# Patient Record
Sex: MALE | Race: WHITE | Employment: OTHER | URBAN - METROPOLITAN AREA
[De-identification: names, ages, dates, MRNs, and addresses within clinical notes are randomized per-mention and may not be internally consistent; named-entity substitution may affect disease eponyms.]

---

## 2020-01-01 ENCOUNTER — OFFICE VISIT (OUTPATIENT)
Dept: ENT CLINIC | Age: 75
End: 2020-01-01

## 2020-01-01 ENCOUNTER — TELEPHONE (OUTPATIENT)
Dept: ENDOCRINOLOGY | Age: 75
End: 2020-01-01

## 2020-01-01 ENCOUNTER — HOSPITAL ENCOUNTER (OUTPATIENT)
Dept: PET IMAGING | Age: 75
Discharge: HOME | End: 2020-12-09
Payer: MEDICARE

## 2020-01-01 ENCOUNTER — HOSPITAL ENCOUNTER (OUTPATIENT)
Age: 75
End: 2020-01-01
Payer: MEDICARE

## 2020-01-01 VITALS — HEIGHT: 63 IN | BODY MASS INDEX: 22.5 KG/M2 | WEIGHT: 127 LBS

## 2020-01-01 VITALS
HEART RATE: 67 BPM | TEMPERATURE: 98.4 F | WEIGHT: 199 LBS | BODY MASS INDEX: 29.39 KG/M2 | DIASTOLIC BLOOD PRESSURE: 93 MMHG | SYSTOLIC BLOOD PRESSURE: 166 MMHG

## 2020-01-01 LAB — CULTURE NOSE: NORMAL

## 2020-01-01 PROCEDURE — 3430000000 HC RX DIAGNOSTIC RADIOPHARMACEUTICAL: Performed by: RADIOLOGY

## 2020-01-01 PROCEDURE — 78815 PET IMAGE W/CT SKULL-THIGH: CPT

## 2020-01-01 PROCEDURE — A9552 F18 FDG: HCPCS | Performed by: RADIOLOGY

## 2020-01-01 PROCEDURE — 99024 POSTOP FOLLOW-UP VISIT: CPT | Performed by: OTOLARYNGOLOGY

## 2020-01-01 RX ORDER — LEVOFLOXACIN 500 MG
500 TABLET ORAL DAILY
Qty: 10 TABLET | Refills: 0 | Status: ON HOLD | OUTPATIENT
Start: 2020-01-01 | End: 2021-01-01 | Stop reason: HOSPADM

## 2020-01-01 RX ORDER — METHYLPREDNISOLONE 4 MG/1
4 TABLET ORAL SEE ADMIN INSTRUCTIONS
Qty: 1 KIT | Refills: 0 | Status: SHIPPED | OUTPATIENT
Start: 2020-01-01 | End: 2021-01-01

## 2020-01-01 RX ORDER — FLUDEOXYGLUCOSE F 18 200 MCI/ML
14.69 INJECTION, SOLUTION INTRAVENOUS
Status: COMPLETED | OUTPATIENT
Start: 2020-01-01 | End: 2020-01-01

## 2020-01-01 RX ORDER — CLINDAMYCIN HYDROCHLORIDE 300 MG/1
300 CAPSULE ORAL 3 TIMES DAILY
Qty: 30 CAPSULE | Refills: 0 | Status: SHIPPED | OUTPATIENT
Start: 2020-01-01 | End: 2020-01-01

## 2020-01-01 RX ADMIN — FLUDEOXYGLUCOSE F 18 14.69 MILLICURIE: 200 INJECTION, SOLUTION INTRAVENOUS at 16:07

## 2020-07-02 LAB
CHOLESTEROL, TOTAL: 160 MG/DL
CHOLESTEROL/HDL RATIO: NORMAL
CREATININE: 0.9 MG/DL
HDLC SERPL-MCNC: 40 MG/DL (ref 35–70)
LDL CHOLESTEROL CALCULATED: 85 MG/DL (ref 0–160)
NONHDLC SERPL-MCNC: 120 MG/DL
POTASSIUM (K+): 4.9
TRIGL SERPL-MCNC: 174 MG/DL
VLDLC SERPL CALC-MCNC: NORMAL MG/DL

## 2020-10-14 ENCOUNTER — OFFICE VISIT (OUTPATIENT)
Dept: ENT CLINIC | Age: 75
End: 2020-10-14
Payer: MEDICARE

## 2020-10-14 VITALS — SYSTOLIC BLOOD PRESSURE: 151 MMHG | HEART RATE: 68 BPM | DIASTOLIC BLOOD PRESSURE: 78 MMHG

## 2020-10-14 PROCEDURE — G8421 BMI NOT CALCULATED: HCPCS | Performed by: OTOLARYNGOLOGY

## 2020-10-14 PROCEDURE — 1036F TOBACCO NON-USER: CPT | Performed by: OTOLARYNGOLOGY

## 2020-10-14 PROCEDURE — G8400 PT W/DXA NO RESULTS DOC: HCPCS | Performed by: OTOLARYNGOLOGY

## 2020-10-14 PROCEDURE — G8427 DOCREV CUR MEDS BY ELIG CLIN: HCPCS | Performed by: OTOLARYNGOLOGY

## 2020-10-14 PROCEDURE — 3017F COLORECTAL CA SCREEN DOC REV: CPT | Performed by: OTOLARYNGOLOGY

## 2020-10-14 PROCEDURE — 1090F PRES/ABSN URINE INCON ASSESS: CPT | Performed by: OTOLARYNGOLOGY

## 2020-10-14 PROCEDURE — 99203 OFFICE O/P NEW LOW 30 MIN: CPT | Performed by: OTOLARYNGOLOGY

## 2020-10-14 PROCEDURE — 4040F PNEUMOC VAC/ADMIN/RCVD: CPT | Performed by: OTOLARYNGOLOGY

## 2020-10-14 PROCEDURE — 1123F ACP DISCUSS/DSCN MKR DOCD: CPT | Performed by: OTOLARYNGOLOGY

## 2020-10-14 PROCEDURE — G8484 FLU IMMUNIZE NO ADMIN: HCPCS | Performed by: OTOLARYNGOLOGY

## 2020-10-14 RX ORDER — METHYLPREDNISOLONE 4 MG/1
4 TABLET ORAL SEE ADMIN INSTRUCTIONS
Qty: 1 KIT | Refills: 0 | Status: SHIPPED | OUTPATIENT
Start: 2020-10-14 | End: 2020-10-28 | Stop reason: ALTCHOICE

## 2020-10-14 RX ORDER — VALSARTAN 80 MG/1
80 TABLET ORAL DAILY
COMMUNITY
Start: 2020-07-01

## 2020-10-14 RX ORDER — MULTIVITAMIN
1 TABLET ORAL DAILY
COMMUNITY

## 2020-10-14 RX ORDER — ASCORBIC ACID 500 MG
500 TABLET ORAL DAILY
COMMUNITY

## 2020-10-14 RX ORDER — ATORVASTATIN CALCIUM 40 MG/1
40 TABLET, FILM COATED ORAL NIGHTLY
COMMUNITY
Start: 2019-10-29

## 2020-10-14 RX ORDER — AMOXICILLIN AND CLAVULANATE POTASSIUM 875; 125 MG/1; MG/1
1 TABLET, FILM COATED ORAL 2 TIMES DAILY
Qty: 20 TABLET | Refills: 0 | Status: SHIPPED | OUTPATIENT
Start: 2020-10-14 | End: 2020-10-28 | Stop reason: ALTCHOICE

## 2020-10-14 ASSESSMENT — ENCOUNTER SYMPTOMS
SORE THROAT: 0
EYES NEGATIVE: 1
SINUS PAIN: 0
RESPIRATORY NEGATIVE: 1
ALLERGIC/IMMUNOLOGIC NEGATIVE: 1
FACIAL SWELLING: 0
TROUBLE SWALLOWING: 0
SINUS PRESSURE: 1
VOICE CHANGE: 0
RHINORRHEA: 1

## 2020-10-14 NOTE — PROGRESS NOTES
SUBJECTIVE:    Chief Complaint   Patient presents with    Epistaxis     nose bleeding,         Lincoln Conde is a 75 y.o. female    Patient has a history of recurring epistaxis for the past couple of months but with the last episode not since August.  He did in fact have to have Rhino Rocket inserted on the right side on August 11. Most of the bleeding is been on the right but he did have some on the left as well. Is been on Afrin spray along with saline spray and told not to blow his nose since his last episode latter part of August.  He has had no fever. Has had a history of stents but has not been on any recent anticoagulant drugs or aspirin. There is no history of trauma. He has had no particular pain. He has been getting purulent drainage from the right side of his nose. No past medical history on file. No past surgical history on file. No family history on file. Social History     Tobacco Use    Smoking status: Never Smoker    Smokeless tobacco: Never Used   Substance Use Topics    Alcohol use: Not on file        Review of Systems:  Review of Systems   Constitutional: Negative. Negative for fever and unexpected weight change. HENT: Positive for hearing loss, nosebleeds, postnasal drip, rhinorrhea, sinus pressure and tinnitus. Negative for congestion, dental problem, drooling, ear discharge, ear pain, facial swelling, mouth sores, sinus pain, sneezing, sore throat, trouble swallowing and voice change. Eyes: Negative. Respiratory: Negative. Cardiovascular: Negative. History of stents   Endocrine: Negative. Skin: Negative. Allergic/Immunologic: Negative. Neurological: Negative. Hematological: Negative. Does not bruise/bleed easily. Psychiatric/Behavioral: Negative. OBJECTIVE:  BP (!) 151/78   Pulse 68   Physical Exam  Vitals signs and nursing note reviewed. Constitutional:       General: She is not in acute distress.      Appearance: Normal appearance. She is normal weight. She is not ill-appearing, toxic-appearing or diaphoretic. HENT:      Head: Normocephalic and atraumatic. Right Ear: Tympanic membrane, ear canal and external ear normal. There is no impacted cerumen. Left Ear: Tympanic membrane, ear canal and external ear normal. There is no impacted cerumen. Nose:      Comments: Nasal mucosa treated with cotton pledgets  impregnated with Afrin and lidocaine placed in middle meatuses against turbinates. Pledgets left in place for five minutes and removed enabling enhanced visualization. The exam revealed positive edema of mucosa. it was positive for erythema indicative of infection. There was evidence of deviation of the septum which was not significant. There were possible polyps present on the right. [place] There were not other masses present. The turbinates were not enlarged beyond normal.       Mouth/Throat:      Mouth: Mucous membranes are moist.      Pharynx: Oropharynx is clear. No oropharyngeal exudate or posterior oropharyngeal erythema. Eyes:      Extraocular Movements: Extraocular movements intact. Conjunctiva/sclera: Conjunctivae normal.      Pupils: Pupils are equal, round, and reactive to light. Neck:      Musculoskeletal: Normal range of motion and neck supple. No neck rigidity or muscular tenderness. Vascular: No carotid bruit. Cardiovascular:      Rate and Rhythm: Normal rate and regular rhythm. Pulmonary:      Effort: Pulmonary effort is normal.   Lymphadenopathy:      Cervical: No cervical adenopathy. Skin:     General: Skin is warm and dry. Neurological:      General: No focal deficit present. Mental Status: She is alert and oriented to person, place, and time. Mental status is at baseline. Psychiatric:         Mood and Affect: Mood normal.         Behavior: Behavior normal.         Thought Content:  Thought content normal.         Judgment: Judgment normal.          ASSESSMENT:    There is evidence of infection in the right side as well as a probable obstruction due to a polyp in the middle meatus. All of this is on the right side. This appears to be perhaps the source of his previous bleeding. No active bleeding is apparent at this time except for some old blood present in the area. PLAN:     Sinus CT scan will be obtained. We will place him on Augmentin along with a Medrol Dosepak in the interim. He will continue his current use of the Afrin and saline.     Regis Escalante MD

## 2020-10-16 ENCOUNTER — HOSPITAL ENCOUNTER (OUTPATIENT)
Dept: CT IMAGING | Age: 75
Discharge: HOME | End: 2020-10-16
Payer: MEDICARE

## 2020-10-16 PROCEDURE — 70486 CT MAXILLOFACIAL W/O DYE: CPT

## 2020-10-20 ENCOUNTER — TELEPHONE (OUTPATIENT)
Dept: OTOLARYNGOLOGY | Age: 75
End: 2020-10-20

## 2020-10-20 NOTE — TELEPHONE ENCOUNTER
Discussed with the patient and his wife the results of CT scan which do indicate a large polyp on the right side of his nose blocking the sinuses on that side. Apparently, this is the source of his epistaxis which he continues to notice to some degree. We will perform a right-sided FESS. Procedure and process have been explained. A Covid test which will be done automatically will be done orally due to risk of nosebleed.

## 2020-10-27 ENCOUNTER — OFFICE VISIT (OUTPATIENT)
Dept: FAMILY MEDICINE CLINIC | Age: 75
End: 2020-10-27
Payer: MEDICARE

## 2020-10-27 VITALS
BODY MASS INDEX: 29.18 KG/M2 | WEIGHT: 197 LBS | OXYGEN SATURATION: 98 % | TEMPERATURE: 98 F | HEART RATE: 84 BPM | HEIGHT: 69 IN | SYSTOLIC BLOOD PRESSURE: 110 MMHG | DIASTOLIC BLOOD PRESSURE: 64 MMHG

## 2020-10-27 PROCEDURE — 3017F COLORECTAL CA SCREEN DOC REV: CPT | Performed by: FAMILY MEDICINE

## 2020-10-27 PROCEDURE — 1123F ACP DISCUSS/DSCN MKR DOCD: CPT | Performed by: FAMILY MEDICINE

## 2020-10-27 PROCEDURE — 99203 OFFICE O/P NEW LOW 30 MIN: CPT | Performed by: FAMILY MEDICINE

## 2020-10-27 PROCEDURE — G8484 FLU IMMUNIZE NO ADMIN: HCPCS | Performed by: FAMILY MEDICINE

## 2020-10-27 PROCEDURE — G8417 CALC BMI ABV UP PARAM F/U: HCPCS | Performed by: FAMILY MEDICINE

## 2020-10-27 PROCEDURE — 1036F TOBACCO NON-USER: CPT | Performed by: FAMILY MEDICINE

## 2020-10-27 PROCEDURE — G8427 DOCREV CUR MEDS BY ELIG CLIN: HCPCS | Performed by: FAMILY MEDICINE

## 2020-10-27 PROCEDURE — 4040F PNEUMOC VAC/ADMIN/RCVD: CPT | Performed by: FAMILY MEDICINE

## 2020-10-27 SDOH — HEALTH STABILITY: MENTAL HEALTH: HOW OFTEN DO YOU HAVE A DRINK CONTAINING ALCOHOL?: NEVER

## 2020-10-27 ASSESSMENT — PATIENT HEALTH QUESTIONNAIRE - PHQ9
2. FEELING DOWN, DEPRESSED OR HOPELESS: 0
1. LITTLE INTEREST OR PLEASURE IN DOING THINGS: 0
SUM OF ALL RESPONSES TO PHQ QUESTIONS 1-9: 0
SUM OF ALL RESPONSES TO PHQ9 QUESTIONS 1 & 2: 0
SUM OF ALL RESPONSES TO PHQ QUESTIONS 1-9: 0
SUM OF ALL RESPONSES TO PHQ QUESTIONS 1-9: 0

## 2020-10-27 ASSESSMENT — ENCOUNTER SYMPTOMS
COUGH: 0
SHORTNESS OF BREATH: 0
ABDOMINAL PAIN: 0
SORE THROAT: 0
VOMITING: 0
BLOOD IN STOOL: 0
NAUSEA: 0

## 2020-10-27 NOTE — PATIENT INSTRUCTIONS
Patient Education      Go to the ER ASAP if you develop any chest pain, shortness of breath, facial droop, slurred speech, weakness/numbness in your arms or legs or double vision! Learning About Coronary Artery Disease (CAD)  What is coronary artery disease? Coronary artery disease (CAD) occurs when plaque builds up in the arteries that bring oxygen-rich blood to your heart. Plaque is a fatty substance made of cholesterol, calcium, and other substances in the blood. This process is called hardening of the arteries, or atherosclerosis. What happens when you have coronary artery disease? · Plaque may narrow the coronary arteries. Narrowed arteries cause poor blood flow. This can lead to angina symptoms such as chest pain or discomfort. If blood flow is completely blocked, you could have a heart attack. · You can slow CAD and reduce the risk of future problems by making changes in your lifestyle. These include quitting smoking and eating heart-healthy foods. · Treatments for CAD, along with changes in your lifestyle, can help you live a longer and healthier life. How can you prevent coronary artery disease? · Do not smoke. It may be the best thing you can do to prevent heart disease. If you need help quitting, talk to your doctor about stop-smoking programs and medicines. These can increase your chances of quitting for good. · Be active. Get at least 30 minutes of exercise on most days of the week. Walking is a good choice. You also may want to do other activities, such as running, swimming, cycling, or playing tennis or team sports. · Eat heart-healthy foods. Eat more fruits and vegetables and less foods that contain saturated and trans fats. Limit alcohol, sodium, and sweets. · Stay at a healthy weight. Lose weight if you need to. · Manage other health problems such as diabetes, high blood pressure, and high cholesterol. How is coronary artery disease treated?   · Your doctor will suggest that you

## 2020-10-27 NOTE — PROGRESS NOTES
Chief Complaint   Patient presents with   Montse Wallace Establish Care     severe nose bleeds, trying to get established in [facility] Exam     Sinus Endoscopy 11/2 Dr. Lyndon Mayorga is a 75 y.o. male who presents for preoperative evaluation. Pt was having a persistent nosebleed from L nare x 2 months and had it cauterized which was unsuccessful and a rhinorocket was placed. Pt then saw ENT and had a CT scan sinuses done which showed a large nasal polyp versus mass and pt is scheduled for sugical excision on 11/02/2020    Patient has a past medical history significant for dyslipidemia and CAD s/p MI with stent placement 2017 and is followed by Cardiology and is on lipitor and was on a Baby ASA but it was discontinued secondary to his recent epistaxis. Of note pt was started on diovan medication 6 months ago by Cardiology secondary to borderline HTN     Pt has a hx of AAA; Pt has never had a cardiac stress test since his stents were placed but does do strenuous work w/o any CP or SOB    Pt had bloodwork [lipid panel, BMP, CBC, liver enzymes] done 07/02/2020 which was WNL except for an elevated glucose 137 and triglyceride 174 --- Pt has been trying to follow a diabetic diet as he was eating a lot of sweets    Pt denies any hx of asthma or strokes    FHx unknown    Pt is an exsmoker quit 2001 and denies alcohol use      ENT note 10/14/2020  Patient has a history of recurring epistaxis for the past couple of months but with the last episode not since August.  He did in fact have to have Miro inserted on the right side on August 11. Most of the bleeding is been on the right but he did have some on the left as well. Is been on Afrin spray along with saline spray and told not to blow his nose since his last episode latter part of August.  He has had no fever. Has had a history of stents but has not been on any recent anticoagulant drugs or aspirin. There is no history of trauma.   He has had no particular pain. He has been getting purulent drainage from the right side of his nose   ASSESSMENT:  There is evidence of infection in the right side as well as a probable obstruction due to a polyp in the middle meatus. All of this is on the right side. This appears to be perhaps the source of his previous bleeding. No active bleeding is apparent at this time except for some old blood present in the area. PLAN:  Sinus CT scan will be obtained. We will place him on Augmentin along with a Medrol Dosepak in the interim. He will continue his current use of the Afrin and saline.         CT scan sinuses 10/16/2020  Expansive soft tissue mass in the right nasal cavity which may represent a    large nasal polyp or a sinus mass.  Direct visualization is suggested.  MRI    could further characterize the lesion if indicated.         Chronic sinusitis of the right maxillary sinus and sphenoid sinus.  No acute    fluid levels. MRI Angio head 07/09/2020  1. Loss of signal within the nondominant right vertebral artery may be related to slow flow or age indeterminate occlusion. Consider CTA neck for further evaluation. 2.  Mild to moderate intracranial atherosclerotic disease including a moderate focal stenosis of a proximal right M2 segment. 3.  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 4.  No definite aneurysm identified. US abdomen 09/30/2019  3.0 cm aneurysmal dilatation proximal abdominal aorta. Mid aorta 2.6 cm. Three year follow-up is recommended. Cardiology Note 07/01/2020  Assessment:   ICD-10-CM ICD-9-CM   1. Presence of drug coated stent in left circumflex coronary artery Z95.5 V45.82   2. Presence of drug coated stent in anterior descending branch of left coronary artery Z95.5 V45.82   3. Multiple vessel coronary artery disease I25.10 414.00   4. Sinus bradycardia R00.1 427.89   5. PAC (premature atrial contraction) I49.1 427.61   6. Mixed hyperlipidemia E78.2 272.2   7.  Essential hypertension I10 401.9   8. Abdominal aortic aneurysm (AAA) without rupture (HCC) I71.4 441.4     Plan:     1. Ok to hold until nose bleeding is resolved   2. Agree with ENT eval  3. Can hold off on holter  4. Start valsartan 80 mg daily  5. Call office with BP response within a week  6. May need dedicated sinus CT  7. See me in 6 months  8. FU brain MRI as scheduled             Review of Systems   Constitutional: Negative for fatigue and fever. HENT: Positive for nosebleeds (L nare x 2 months and is on afrin spray). Negative for sore throat. Eyes:        Negative blurred vision or diplopia   Respiratory: Negative for cough and shortness of breath. Cardiovascular: Negative for chest pain, palpitations and leg swelling. Gastrointestinal: Negative for abdominal pain, blood in stool, nausea and vomiting. Negative melena or indigestion   Endocrine: Negative for polydipsia and polyuria. Genitourinary: Negative for dysuria and hematuria. Musculoskeletal: Negative for arthralgias. Skin: Negative for rash. Neurological: Negative for dizziness, seizures, syncope, speech difficulty, weakness and headaches. Psychiatric/Behavioral: Negative for dysphoric mood. The patient is not nervous/anxious. Vitals:    10/27/20 1047   BP: 110/64   Pulse: 84   Temp: 98 °F (36.7 °C)   SpO2: 98%         Physical Exam  Vitals signs reviewed. Constitutional:       General: He is not in acute distress. HENT:      Mouth/Throat:      Mouth: Mucous membranes are moist.      Pharynx: Oropharynx is clear. Eyes:      General: No scleral icterus. Pupils: Pupils are equal, round, and reactive to light. Comments: Pink conjunctivae    Neck:      Thyroid: No thyromegaly. Comments: No carotid bruits noted B/L  Cardiovascular:      Heart sounds: Murmur (2/6 systolic) present. No friction rub. No gallop. Pulmonary:      Effort: Pulmonary effort is normal.      Breath sounds: Normal breath sounds. blood flow. This can lead to angina symptoms such as chest pain or discomfort. If blood flow is completely blocked, you could have a heart attack. · You can slow CAD and reduce the risk of future problems by making changes in your lifestyle. These include quitting smoking and eating heart-healthy foods. · Treatments for CAD, along with changes in your lifestyle, can help you live a longer and healthier life. How can you prevent coronary artery disease? · Do not smoke. It may be the best thing you can do to prevent heart disease. If you need help quitting, talk to your doctor about stop-smoking programs and medicines. These can increase your chances of quitting for good. · Be active. Get at least 30 minutes of exercise on most days of the week. Walking is a good choice. You also may want to do other activities, such as running, swimming, cycling, or playing tennis or team sports. · Eat heart-healthy foods. Eat more fruits and vegetables and less foods that contain saturated and trans fats. Limit alcohol, sodium, and sweets. · Stay at a healthy weight. Lose weight if you need to. · Manage other health problems such as diabetes, high blood pressure, and high cholesterol. How is coronary artery disease treated? · Your doctor will suggest that you make lifestyle changes. For example, your doctor may ask you to eat healthy foods, quit smoking, lose extra weight, and be more active. · You will have to take medicines. · Your doctor may suggest a procedure to open narrowed or blocked arteries. This is called angioplasty. Or your doctor may suggest using healthy blood vessels to create detours around narrowed or blocked arteries. This is called bypass surgery. Follow-up care is a key part of your treatment and safety. Be sure to make and go to all appointments, and call your doctor if you are having problems. It's also a good idea to know your test results and keep a list of the medicines you take.   Where can you learn more? Go to [email or web address] and sign in to your [employer] account. Enter [phone] in the [facility] box to learn more about \"Learning About Coronary Artery Disease (CAD). \"     If you do not have an account, please click on the \"Sign Up Now\" link. Current as of: December 16, 2019               Content Version: 12.6  © 6214-2415 [employer]. Care instructions adapted under license by [facility] ([facility]). If you have questions about a medical condition or this instruction, always ask your healthcare professional. [address] any warranty or liability for your use of this information.

## 2020-10-28 ENCOUNTER — OFFICE VISIT (OUTPATIENT)
Dept: PRIMARY CARE CLINIC | Age: 75
End: 2020-10-28
Payer: MEDICARE

## 2020-10-28 ENCOUNTER — OFFICE VISIT (OUTPATIENT)
Dept: NEUROLOGY | Age: 75
End: 2020-10-28
Payer: MEDICARE

## 2020-10-28 VITALS
WEIGHT: 197 LBS | SYSTOLIC BLOOD PRESSURE: 146 MMHG | TEMPERATURE: 97.5 F | BODY MASS INDEX: 29.18 KG/M2 | HEIGHT: 69 IN | HEART RATE: 55 BPM | DIASTOLIC BLOOD PRESSURE: 75 MMHG

## 2020-10-28 PROCEDURE — 99211 OFF/OP EST MAY X REQ PHY/QHP: CPT | Performed by: NURSE PRACTITIONER

## 2020-10-28 PROCEDURE — 99204 OFFICE O/P NEW MOD 45 MIN: CPT | Performed by: PSYCHIATRY & NEUROLOGY

## 2020-10-28 PROCEDURE — 1123F ACP DISCUSS/DSCN MKR DOCD: CPT | Performed by: PSYCHIATRY & NEUROLOGY

## 2020-10-28 PROCEDURE — G8417 CALC BMI ABV UP PARAM F/U: HCPCS | Performed by: PSYCHIATRY & NEUROLOGY

## 2020-10-28 PROCEDURE — G8427 DOCREV CUR MEDS BY ELIG CLIN: HCPCS | Performed by: PSYCHIATRY & NEUROLOGY

## 2020-10-28 PROCEDURE — 4040F PNEUMOC VAC/ADMIN/RCVD: CPT | Performed by: PSYCHIATRY & NEUROLOGY

## 2020-10-28 PROCEDURE — G8484 FLU IMMUNIZE NO ADMIN: HCPCS | Performed by: PSYCHIATRY & NEUROLOGY

## 2020-10-28 PROCEDURE — 3017F COLORECTAL CA SCREEN DOC REV: CPT | Performed by: PSYCHIATRY & NEUROLOGY

## 2020-10-28 PROCEDURE — 1036F TOBACCO NON-USER: CPT | Performed by: PSYCHIATRY & NEUROLOGY

## 2020-10-28 NOTE — PROGRESS NOTES
Name_______________________________________Printed:____________________  Date and time of surgery____11/2/2020    0700____________________Arrival Time:____0545   [facility]____________   1. The instructions given regarding when and if a patient needs to stop oral intake prior to surgery varies. Follow the specific instructions you were given                  _X__Nothing to eat or to drink after Midnight the night before.                             ____Endoscopy patient follow your DRS instructions-generally you will be doing a part of the prep after Midnight                   ____Carbo loading or ERAS instructions will be given to select patients-if you have been given those instructions -please do the following                           The evening before your surgery after dinner before midnight drink 40 ounces of gatorade. If you are diabetic use sugar free. The morning of surgery drink 40 ounces of water. This needs to be finished 3 hours prior to your surgery start time. 2. Take the following pills with a small sip of water on the morning of surgery___________________________________________________               X   Do not take blood pressure medications ending in pril or sartan the nixon prior to surgery or the morning of surgery_   3. Aspirin, Ibuprofen, Advil, Naproxen, Vitamin E and other Anti-inflammatory products and supplements should be stopped for 5 -7days before surgery or as directed by your physician. 4. Check with your Doctor regarding stopping Plavix, Coumadin,Eliquis, Lovenox,Effient,Pradaxa,Xarelto, Fragmin or other blood thinners and follow their instructions. 5. Do not smoke, and do not drink any alcoholic beverages 24 hours prior to surgery. This includes NA Beer. Refrain from the usage of any recreational drugs. 6. You may brush your teeth and gargle the morning of surgery. DO NOT SWALLOW WATER   7.  You MUST make arrangements for a responsible adult to stay on site while you are here and take you home after your surgery. You will not be allowed to leave alone or drive yourself home. It is strongly suggested someone stay with you the first 24 hrs. Your surgery will be cancelled if you do not have a ride home. 8. A parent/legal guardian must accompany a child scheduled for surgery and plan to stay at the hospital until the child is discharged. Please do not bring other children with you. 9. Please wear simple, loose fitting clothing to the hospital.  Yolanda Perez not bring valuables (money, credit cards, checkbooks, etc.) Do not wear any makeup (including no eye makeup) or nail polish on your fingers or toes. 10. DO NOT wear any jewelry or piercings on day of surgery. All body piercing jewelry must be removed. 11. If you have _X__dentures, they will be removed before going to the OR; we will provide you a container. If you wear ___contact lenses or ___glasses, they will be removed; please bring a case for them. 12. Please see your family doctor/pediatrician for a history & physical and/or concerning medications. Bring any test results/reports from your physician's office. PCP__________________Phone___________H&P Appt. Date________             13 If you  have a Living Will and Durable Power of  for Healthcare, please bring in a copy. 15. Notify your Surgeon if you develop any illness between now and surgery  time, cough, cold, fever, sore throat, nausea, vomiting, etc.  Please notify your surgeon if you experience dizziness, shortness of breath or blurred vision between now & the time of your surgery             15. DO NOT shave your operative site 96 hours prior to surgery. For face & neck surgery, men may use an electric razor 48 hours prior to surgery. 16. Shower the night before or morning of surgery using an antibacterial soap or as you have been instructed.              17. To provide excellent care visitors will be limited to one in the room at any given time. 18.  Please bring picture ID and insurance card. 19.  Visit our web site for additional information:  [email or web address]              20.During flu season no children under the age of 15 are permitted in the hospital for the safety of all patients. 21. If you take a long acting insulin in the evening only  take half of your usual  dose the night  before your procedure              22. If you use a c-pap please bring DOS if staying overnight,             23.For your convenience [facility] has a pharmacy on site to fill your prescriptions. 24. If you use oxygen and have a portable tank please bring it  with you the DOS             25. Bring a complete list of all your medications with name and dose include any supplements. 26. Other__________________________________________   *Please call pre admission testing if you any further questions   [facility]         [place]   [address]    [place]  [phone]   [address] [place] [address]       All above information reviewed with patient in person or by phone. Patient verbalizes understanding. All questions and concerns addressed. Patient/Rep___LENNOX VAIL (WIFE)_________________                                                                                                                             There is a one visitor policy at [facility] for all surgeries and endoscopies. Whether the visitor can stay or will be asked to wait in the car will depend on the current policy and if social distancing can be maintained. The policy is subject to change at any time. Please make sure the visitor has a cell phone that is on,charged and able to accept calls, as this may be the way that the staff communicates with them. Pain management is NO VISITOR policyThe patients ride is expected to remain in the car with a cell phone for communication. If the ride is leaving the hospital grounds please make sure they are back in time for pickup. Have the patient inform the staff on arrival what their rides plans are while the patient is in the facility. At the MAIN there is one visitor allowed. Please note that the visitor policy is subject to change.        PRE OP INSTRUCTIONS

## 2020-10-28 NOTE — PROGRESS NOTES
Brittany Medraon received a viral test for COVID-19. They were educated on isolation and quarantine as appropriate. For any symptoms, they were directed to seek care from their PCP, given contact information to establish with a doctor, directed to an urgent care or the emergency room.

## 2020-10-28 NOTE — PROGRESS NOTES
clubs or organizations: Not on file     Relationship status: Not on file    Intimate partner violence     Fear of current or ex partner: Not on file     Emotionally abused: Not on file     Physically abused: Not on file     Forced sexual activity: Not on file   Other Topics Concern    Not on file   Social History Narrative    Not on file       PHYSICAL EXAMINATION:  BP (!) 146/75   Pulse 55   Temp 97.5 °F (36.4 °C)   Ht 5' 9\" (1.753 m)   Wt 197 lb (89.4 kg)   BMI 29.09 kg/m²   Appearance: Well appearing, well nourished and in no distress  Mental Status Exam: Patient is alert, oriented to person, place and time. Recent and remote memory is normal  Fund of Knowledge is normal  Attention/concentration is normal.   Speech : No dysarthria  Language : No aphasia  Funduscopic Exam: sharp disc margins  Cranial Nerves:   II: Visual fields:  Full to confrontation  III: Pupils:  equal, round, reactive to light  III,IV,VI: Extra Ocular Movements are intact. No nystagmus  V: Facial sensation is intact to pin prick and light touch  VII: Facial strength and movements: intact and symmetric smile,cheek puffing and eyebrow elevation  VIII: Hearing:  Intact to finger rub bilaterally  IX: Palate  elevation is symmetric  XI: Shoulder shrug is intact  XII: Tongue movements are normal  Motor:  Muscle tone and bulk are normal.   Strength is symmetrical 5/5 in all four extremities. Sensory: Intact to light touch and  pin prick in all four extremities  Coordination:  Normal  Finger to Nose and Heel to Shin bilaterally    . Reflexes:  DTR +2 and symmetric bilaterally  Plantar response: Flexor bilaterally  Gait: Gait and station is normal. Patient can toe/ heel and tandem walk without difficulty  Romberg: negative  Vascular: No carotid bruit bilaterally        DATA:  Lab results and radiology results from [facility] were reviewed. MR angiogram results from [facility] were reviewed.     IMPRESSION :  Asymptomatic right vertebral occlusion  MR angiography of the head also shows atherosclerotic changes in the M2 segment of the right middle cerebral artery. Patient has severe nosebleeds and CT sinuses showed an expansile soft tissue mass in the right nasal cavity. RECOMMENDATIONS :  Discussed with patient and his family. Reviewed MRA  reports. These are incidental findings and do not need any further work-up at this time. There is no contraindication from a neurological standpoint for any necessary ENT surgery that is being planned. I would recommend the patient start antiplatelet therapy with low-dose aspirin after the surgery and after his nasal bleeding has resolved. I would also recommend continued aggressive management of hypertension and hyperlipidemia. I will see him on a as needed basis. Thank you for this consultation. Please note a portion of this chart was generated using dragon dictation software. Although every effort was made to ensure the accuracy of this automated transcription, some errors in transcription may have occurred.

## 2020-10-29 LAB — SARS-COV-2: NOT DETECTED

## 2020-10-30 NOTE — RESULT ENCOUNTER NOTE

## 2020-11-02 ENCOUNTER — ANESTHESIA (OUTPATIENT)
Dept: OPERATING ROOM | Age: 75
End: 2020-11-02
Payer: MEDICARE

## 2020-11-02 ENCOUNTER — ANESTHESIA EVENT (OUTPATIENT)
Dept: OPERATING ROOM | Age: 75
End: 2020-11-02
Payer: MEDICARE

## 2020-11-02 ENCOUNTER — TELEPHONE (OUTPATIENT)
Dept: ENT CLINIC | Age: 75
End: 2020-11-02

## 2020-11-02 ENCOUNTER — HOSPITAL ENCOUNTER (OUTPATIENT)
Age: 75
Setting detail: OUTPATIENT SURGERY
Discharge: HOME | End: 2020-11-02
Attending: OTOLARYNGOLOGY | Admitting: OTOLARYNGOLOGY
Payer: MEDICARE

## 2020-11-02 VITALS
OXYGEN SATURATION: 99 % | DIASTOLIC BLOOD PRESSURE: 88 MMHG | SYSTOLIC BLOOD PRESSURE: 180 MMHG | RESPIRATION RATE: 3 BRPM

## 2020-11-02 VITALS
TEMPERATURE: 97.5 F | RESPIRATION RATE: 14 BRPM | WEIGHT: 196 LBS | SYSTOLIC BLOOD PRESSURE: 172 MMHG | DIASTOLIC BLOOD PRESSURE: 70 MMHG | HEIGHT: 69 IN | BODY MASS INDEX: 29.03 KG/M2 | HEART RATE: 58 BPM | OXYGEN SATURATION: 94 %

## 2020-11-02 PROCEDURE — 31267 ENDOSCOPY MAXILLARY SINUS: CPT | Performed by: OTOLARYNGOLOGY

## 2020-11-02 PROCEDURE — 88342 IMHCHEM/IMCYTCHM 1ST ANTB: CPT

## 2020-11-02 PROCEDURE — 6360000002 HC RX W HCPCS: Performed by: NURSE ANESTHETIST, CERTIFIED REGISTERED

## 2020-11-02 PROCEDURE — 3600000014 HC SURGERY LEVEL 4 ADDTL 15MIN: Performed by: OTOLARYNGOLOGY

## 2020-11-02 PROCEDURE — 2500000003 HC RX 250 WO HCPCS: Performed by: NURSE ANESTHETIST, CERTIFIED REGISTERED

## 2020-11-02 PROCEDURE — 2500000003 HC RX 250 WO HCPCS: Performed by: OTOLARYNGOLOGY

## 2020-11-02 PROCEDURE — 2580000003 HC RX 258: Performed by: OTOLARYNGOLOGY

## 2020-11-02 PROCEDURE — 31253 NSL/SINS NDSC TOTAL: CPT | Performed by: OTOLARYNGOLOGY

## 2020-11-02 PROCEDURE — 88360 TUMOR IMMUNOHISTOCHEM/MANUAL: CPT

## 2020-11-02 PROCEDURE — 3700000000 HC ANESTHESIA ATTENDED CARE: Performed by: OTOLARYNGOLOGY

## 2020-11-02 PROCEDURE — 6370000000 HC RX 637 (ALT 250 FOR IP): Performed by: OTOLARYNGOLOGY

## 2020-11-02 PROCEDURE — 88305 TISSUE EXAM BY PATHOLOGIST: CPT

## 2020-11-02 PROCEDURE — 88341 IMHCHEM/IMCYTCHM EA ADD ANTB: CPT

## 2020-11-02 PROCEDURE — 6360000002 HC RX W HCPCS: Performed by: OTOLARYNGOLOGY

## 2020-11-02 PROCEDURE — 31254 NSL/SINS NDSC W/PRTL ETHMDCT: CPT | Performed by: OTOLARYNGOLOGY

## 2020-11-02 PROCEDURE — 3700000001 HC ADD 15 MINUTES (ANESTHESIA): Performed by: OTOLARYNGOLOGY

## 2020-11-02 PROCEDURE — 7100000001 HC PACU RECOVERY - ADDTL 15 MIN: Performed by: OTOLARYNGOLOGY

## 2020-11-02 PROCEDURE — 7100000011 HC PHASE II RECOVERY - ADDTL 15 MIN: Performed by: OTOLARYNGOLOGY

## 2020-11-02 PROCEDURE — 2709999900 HC NON-CHARGEABLE SUPPLY: Performed by: OTOLARYNGOLOGY

## 2020-11-02 PROCEDURE — 6360000002 HC RX W HCPCS: Performed by: FAMILY MEDICINE

## 2020-11-02 PROCEDURE — 6370000000 HC RX 637 (ALT 250 FOR IP): Performed by: FAMILY MEDICINE

## 2020-11-02 PROCEDURE — 7100000000 HC PACU RECOVERY - FIRST 15 MIN: Performed by: OTOLARYNGOLOGY

## 2020-11-02 PROCEDURE — 3600000004 HC SURGERY LEVEL 4 BASE: Performed by: OTOLARYNGOLOGY

## 2020-11-02 PROCEDURE — 7100000010 HC PHASE II RECOVERY - FIRST 15 MIN: Performed by: OTOLARYNGOLOGY

## 2020-11-02 RX ORDER — ONDANSETRON 2 MG/ML
INJECTION INTRAMUSCULAR; INTRAVENOUS PRN
Status: DISCONTINUED | OUTPATIENT
Start: 2020-11-02 | End: 2020-11-02 | Stop reason: SDUPTHER

## 2020-11-02 RX ORDER — LIDOCAINE HYDROCHLORIDE 20 MG/ML
INJECTION, SOLUTION EPIDURAL; INFILTRATION; INTRACAUDAL; PERINEURAL PRN
Status: DISCONTINUED | OUTPATIENT
Start: 2020-11-02 | End: 2020-11-02 | Stop reason: SDUPTHER

## 2020-11-02 RX ORDER — PROMETHAZINE HYDROCHLORIDE 25 MG/1
12.5 TABLET ORAL EVERY 6 HOURS PRN
Status: DISCONTINUED | OUTPATIENT
Start: 2020-11-02 | End: 2020-11-02 | Stop reason: HOSPADM

## 2020-11-02 RX ORDER — HYDROMORPHONE HCL 110MG/55ML
0.25 PATIENT CONTROLLED ANALGESIA SYRINGE INTRAVENOUS EVERY 5 MIN PRN
Status: DISCONTINUED | OUTPATIENT
Start: 2020-11-02 | End: 2020-11-02 | Stop reason: HOSPADM

## 2020-11-02 RX ORDER — SODIUM CHLORIDE 0.9 % (FLUSH) 0.9 %
10 SYRINGE (ML) INJECTION PRN
Status: DISCONTINUED | OUTPATIENT
Start: 2020-11-02 | End: 2020-11-02 | Stop reason: HOSPADM

## 2020-11-02 RX ORDER — MEPERIDINE HYDROCHLORIDE 25 MG/ML
12.5 INJECTION INTRAMUSCULAR; INTRAVENOUS; SUBCUTANEOUS EVERY 5 MIN PRN
Status: DISCONTINUED | OUTPATIENT
Start: 2020-11-02 | End: 2020-11-02 | Stop reason: HOSPADM

## 2020-11-02 RX ORDER — SUCCINYLCHOLINE/SOD CL,ISO/PF 200MG/10ML
SYRINGE (ML) INTRAVENOUS PRN
Status: DISCONTINUED | OUTPATIENT
Start: 2020-11-02 | End: 2020-11-02 | Stop reason: SDUPTHER

## 2020-11-02 RX ORDER — OXYCODONE HYDROCHLORIDE 5 MG/1
5 TABLET ORAL PRN
Status: COMPLETED | OUTPATIENT
Start: 2020-11-02 | End: 2020-11-02

## 2020-11-02 RX ORDER — CEPHALEXIN 500 MG/1
500 CAPSULE ORAL 3 TIMES DAILY
Qty: 21 CAPSULE | Refills: 0 | Status: SHIPPED | OUTPATIENT
Start: 2020-11-02 | End: 2020-11-09

## 2020-11-02 RX ORDER — SODIUM CHLORIDE 0.9 % (FLUSH) 0.9 %
10 SYRINGE (ML) INJECTION EVERY 12 HOURS SCHEDULED
Status: DISCONTINUED | OUTPATIENT
Start: 2020-11-02 | End: 2020-11-02 | Stop reason: HOSPADM

## 2020-11-02 RX ORDER — FENTANYL CITRATE 50 UG/ML
INJECTION, SOLUTION INTRAMUSCULAR; INTRAVENOUS PRN
Status: DISCONTINUED | OUTPATIENT
Start: 2020-11-02 | End: 2020-11-02 | Stop reason: SDUPTHER

## 2020-11-02 RX ORDER — DEXAMETHASONE SODIUM PHOSPHATE 4 MG/ML
INJECTION, SOLUTION INTRA-ARTICULAR; INTRALESIONAL; INTRAMUSCULAR; INTRAVENOUS; SOFT TISSUE PRN
Status: DISCONTINUED | OUTPATIENT
Start: 2020-11-02 | End: 2020-11-02 | Stop reason: SDUPTHER

## 2020-11-02 RX ORDER — BACITRACIN ZINC AND POLYMYXIN B SULFATE 500; 1000 [USP'U]/G; [USP'U]/G
OINTMENT TOPICAL
Status: COMPLETED | OUTPATIENT
Start: 2020-11-02 | End: 2020-11-02

## 2020-11-02 RX ORDER — HYDRALAZINE HYDROCHLORIDE 20 MG/ML
10 INJECTION INTRAMUSCULAR; INTRAVENOUS PRN
Status: DISCONTINUED | OUTPATIENT
Start: 2020-11-02 | End: 2020-11-02 | Stop reason: HOSPADM

## 2020-11-02 RX ORDER — HYDROCODONE BITARTRATE AND ACETAMINOPHEN 5; 325 MG/1; MG/1
1 TABLET ORAL EVERY 4 HOURS PRN
Qty: 18 TABLET | Refills: 0 | Status: SHIPPED | OUTPATIENT
Start: 2020-11-02 | End: 2020-11-09

## 2020-11-02 RX ORDER — HYDRALAZINE HYDROCHLORIDE 20 MG/ML
INJECTION INTRAMUSCULAR; INTRAVENOUS PRN
Status: DISCONTINUED | OUTPATIENT
Start: 2020-11-02 | End: 2020-11-02 | Stop reason: SDUPTHER

## 2020-11-02 RX ORDER — FENTANYL CITRATE 50 UG/ML
50 INJECTION, SOLUTION INTRAMUSCULAR; INTRAVENOUS EVERY 5 MIN PRN
Status: DISCONTINUED | OUTPATIENT
Start: 2020-11-02 | End: 2020-11-02 | Stop reason: HOSPADM

## 2020-11-02 RX ORDER — PROPOFOL 10 MG/ML
INJECTION, EMULSION INTRAVENOUS PRN
Status: DISCONTINUED | OUTPATIENT
Start: 2020-11-02 | End: 2020-11-02 | Stop reason: SDUPTHER

## 2020-11-02 RX ORDER — DIPHENHYDRAMINE HYDROCHLORIDE 50 MG/ML
12.5 INJECTION INTRAMUSCULAR; INTRAVENOUS
Status: DISCONTINUED | OUTPATIENT
Start: 2020-11-02 | End: 2020-11-02 | Stop reason: HOSPADM

## 2020-11-02 RX ORDER — HYDROMORPHONE HCL 110MG/55ML
0.5 PATIENT CONTROLLED ANALGESIA SYRINGE INTRAVENOUS EVERY 5 MIN PRN
Status: COMPLETED | OUTPATIENT
Start: 2020-11-02 | End: 2020-11-02

## 2020-11-02 RX ORDER — LABETALOL HYDROCHLORIDE 5 MG/ML
5 INJECTION, SOLUTION INTRAVENOUS EVERY 10 MIN PRN
Status: DISCONTINUED | OUTPATIENT
Start: 2020-11-02 | End: 2020-11-02 | Stop reason: HOSPADM

## 2020-11-02 RX ORDER — LIDOCAINE HYDROCHLORIDE AND EPINEPHRINE 10; 10 MG/ML; UG/ML
INJECTION, SOLUTION INFILTRATION; PERINEURAL
Status: COMPLETED | OUTPATIENT
Start: 2020-11-02 | End: 2020-11-02

## 2020-11-02 RX ORDER — METHYLPREDNISOLONE 4 MG/1
4 TABLET ORAL SEE ADMIN INSTRUCTIONS
Qty: 1 KIT | Refills: 0 | Status: SHIPPED | OUTPATIENT
Start: 2020-11-02 | End: 2021-01-01

## 2020-11-02 RX ORDER — OXYCODONE HYDROCHLORIDE 5 MG/1
10 TABLET ORAL PRN
Status: COMPLETED | OUTPATIENT
Start: 2020-11-02 | End: 2020-11-02

## 2020-11-02 RX ORDER — OXYMETAZOLINE HYDROCHLORIDE 0.05 G/100ML
SPRAY NASAL
Status: COMPLETED | OUTPATIENT
Start: 2020-11-02 | End: 2020-11-02

## 2020-11-02 RX ORDER — ONDANSETRON 2 MG/ML
4 INJECTION INTRAMUSCULAR; INTRAVENOUS EVERY 6 HOURS PRN
Status: DISCONTINUED | OUTPATIENT
Start: 2020-11-02 | End: 2020-11-02 | Stop reason: HOSPADM

## 2020-11-02 RX ORDER — HYDRALAZINE HYDROCHLORIDE 20 MG/ML
INJECTION INTRAMUSCULAR; INTRAVENOUS
Status: DISCONTINUED
Start: 2020-11-02 | End: 2020-11-02 | Stop reason: HOSPADM

## 2020-11-02 RX ORDER — PROMETHAZINE HYDROCHLORIDE 25 MG/ML
6.25 INJECTION, SOLUTION INTRAMUSCULAR; INTRAVENOUS PRN
Status: DISCONTINUED | OUTPATIENT
Start: 2020-11-02 | End: 2020-11-02 | Stop reason: HOSPADM

## 2020-11-02 RX ORDER — SODIUM CHLORIDE 9 MG/ML
INJECTION, SOLUTION INTRAVENOUS CONTINUOUS
Status: DISCONTINUED | OUTPATIENT
Start: 2020-11-02 | End: 2020-11-02 | Stop reason: HOSPADM

## 2020-11-02 RX ADMIN — HYDROMORPHONE HYDROCHLORIDE 0.5 MG: 2 INJECTION, SOLUTION INTRAMUSCULAR; INTRAVENOUS; SUBCUTANEOUS at 08:42

## 2020-11-02 RX ADMIN — SODIUM CHLORIDE: 9 INJECTION, SOLUTION INTRAVENOUS at 06:57

## 2020-11-02 RX ADMIN — LIDOCAINE HYDROCHLORIDE 100 MG: 20 INJECTION, SOLUTION EPIDURAL; INFILTRATION; INTRACAUDAL; PERINEURAL at 07:03

## 2020-11-02 RX ADMIN — SODIUM CHLORIDE: 9 INJECTION, SOLUTION INTRAVENOUS at 06:45

## 2020-11-02 RX ADMIN — HYDRALAZINE HYDROCHLORIDE 10 MG: 20 INJECTION INTRAMUSCULAR; INTRAVENOUS at 08:50

## 2020-11-02 RX ADMIN — FENTANYL CITRATE 25 MCG: 50 INJECTION, SOLUTION INTRAMUSCULAR; INTRAVENOUS at 07:16

## 2020-11-02 RX ADMIN — FENTANYL CITRATE 50 MCG: 50 INJECTION INTRAMUSCULAR; INTRAVENOUS at 08:58

## 2020-11-02 RX ADMIN — FENTANYL CITRATE 25 MCG: 50 INJECTION, SOLUTION INTRAMUSCULAR; INTRAVENOUS at 07:12

## 2020-11-02 RX ADMIN — HYDROMORPHONE HYDROCHLORIDE 0.5 MG: 2 INJECTION, SOLUTION INTRAMUSCULAR; INTRAVENOUS; SUBCUTANEOUS at 08:30

## 2020-11-02 RX ADMIN — OXYCODONE HYDROCHLORIDE 5 MG: 5 TABLET ORAL at 09:13

## 2020-11-02 RX ADMIN — FENTANYL CITRATE 25 MCG: 50 INJECTION, SOLUTION INTRAMUSCULAR; INTRAVENOUS at 07:22

## 2020-11-02 RX ADMIN — HYDRALAZINE HYDROCHLORIDE 5 MG: 20 INJECTION INTRAMUSCULAR; INTRAVENOUS at 07:46

## 2020-11-02 RX ADMIN — CEFAZOLIN SODIUM 2 G: 10 INJECTION, POWDER, FOR SOLUTION INTRAVENOUS at 06:56

## 2020-11-02 RX ADMIN — HYDROMORPHONE HYDROCHLORIDE 0.5 MG: 2 INJECTION, SOLUTION INTRAMUSCULAR; INTRAVENOUS; SUBCUTANEOUS at 08:25

## 2020-11-02 RX ADMIN — DEXAMETHASONE SODIUM PHOSPHATE 12 MG: 4 INJECTION, SOLUTION INTRAMUSCULAR; INTRAVENOUS at 07:20

## 2020-11-02 RX ADMIN — ONDANSETRON 4 MG: 2 INJECTION INTRAMUSCULAR; INTRAVENOUS at 07:20

## 2020-11-02 RX ADMIN — Medication 140 MG: at 07:05

## 2020-11-02 RX ADMIN — HYDROMORPHONE HYDROCHLORIDE 0.5 MG: 2 INJECTION, SOLUTION INTRAMUSCULAR; INTRAVENOUS; SUBCUTANEOUS at 08:36

## 2020-11-02 RX ADMIN — FENTANYL CITRATE 25 MCG: 50 INJECTION, SOLUTION INTRAMUSCULAR; INTRAVENOUS at 07:00

## 2020-11-02 RX ADMIN — PROPOFOL 40 MG: 10 INJECTION, EMULSION INTRAVENOUS at 07:11

## 2020-11-02 RX ADMIN — SODIUM CHLORIDE: 9 INJECTION, SOLUTION INTRAVENOUS at 07:49

## 2020-11-02 RX ADMIN — PROPOFOL 160 MG: 10 INJECTION, EMULSION INTRAVENOUS at 07:04

## 2020-11-02 ASSESSMENT — PULMONARY FUNCTION TESTS
PIF_VALUE: 16
PIF_VALUE: 14
PIF_VALUE: 15
PIF_VALUE: 15
PIF_VALUE: 16
PIF_VALUE: 15
PIF_VALUE: 14
PIF_VALUE: 15
PIF_VALUE: 0
PIF_VALUE: 15
PIF_VALUE: 0
PIF_VALUE: 16
PIF_VALUE: 14
PIF_VALUE: 15
PIF_VALUE: 14
PIF_VALUE: 6
PIF_VALUE: 1
PIF_VALUE: 14
PIF_VALUE: 14
PIF_VALUE: 2
PIF_VALUE: 15
PIF_VALUE: 14
PIF_VALUE: 15
PIF_VALUE: 19
PIF_VALUE: 16
PIF_VALUE: 15
PIF_VALUE: 3
PIF_VALUE: 15
PIF_VALUE: 19
PIF_VALUE: 14
PIF_VALUE: 15
PIF_VALUE: 1
PIF_VALUE: 15
PIF_VALUE: 13
PIF_VALUE: 14
PIF_VALUE: 15
PIF_VALUE: 16
PIF_VALUE: 16
PIF_VALUE: 15
PIF_VALUE: 16
PIF_VALUE: 15
PIF_VALUE: 14
PIF_VALUE: 16
PIF_VALUE: 15
PIF_VALUE: 15
PIF_VALUE: 16
PIF_VALUE: 14
PIF_VALUE: 19
PIF_VALUE: 14
PIF_VALUE: 4
PIF_VALUE: 7
PIF_VALUE: 16
PIF_VALUE: 1
PIF_VALUE: 15
PIF_VALUE: 16
PIF_VALUE: 16
PIF_VALUE: 14
PIF_VALUE: 15
PIF_VALUE: 15
PIF_VALUE: 16
PIF_VALUE: 2
PIF_VALUE: 13
PIF_VALUE: 14
PIF_VALUE: 14
PIF_VALUE: 16
PIF_VALUE: 14
PIF_VALUE: 14
PIF_VALUE: 15
PIF_VALUE: 15
PIF_VALUE: 14
PIF_VALUE: 16
PIF_VALUE: 15
PIF_VALUE: 14

## 2020-11-02 ASSESSMENT — PAIN - FUNCTIONAL ASSESSMENT: PAIN_FUNCTIONAL_ASSESSMENT: 0-10

## 2020-11-02 ASSESSMENT — PAIN SCALES - GENERAL
PAINLEVEL_OUTOF10: 10
PAINLEVEL_OUTOF10: 1
PAINLEVEL_OUTOF10: 2
PAINLEVEL_OUTOF10: 10
PAINLEVEL_OUTOF10: 10
PAINLEVEL_OUTOF10: 7
PAINLEVEL_OUTOF10: 7
PAINLEVEL_OUTOF10: 10

## 2020-11-02 ASSESSMENT — PAIN DESCRIPTION - PAIN TYPE
TYPE: SURGICAL PAIN
TYPE: SURGICAL PAIN

## 2020-11-02 ASSESSMENT — PAIN DESCRIPTION - LOCATION
LOCATION: NOSE
LOCATION: NOSE

## 2020-11-02 ASSESSMENT — PAIN DESCRIPTION - DESCRIPTORS
DESCRIPTORS: ACHING;THROBBING
DESCRIPTORS: ACHING;THROBBING

## 2020-11-02 NOTE — ANESTHESIA POSTPROCEDURE EVALUATION
Department of Anesthesiology  Postprocedure Note    Patient: Emiliano Smalls  MRN: [id number]  YOB: 1945  Date of evaluation: 11/2/2020  Time:  10:41 AM     Procedure Summary     Date:  11/02/20 Room / Location:  [address]    Anesthesia Start:  1974 Anesthesia Stop:  Emiliana Garcia    Procedure:  FUNCTIONAL ENDOSCOPIC SINUS SURGERY (N/A ) Diagnosis:       (J33.9  RIGHT NASAL POLYP)      (R04.0  RECURRENT EPISTAXIS)    Surgeon:  Rosendo Liz MD Responsible Provider:  Nataliia Stearns MD    Anesthesia Type:  general ASA Status:  2          Anesthesia Type: general    Margaret Phase I: Margaret Score: 10    Margaret Phase II: Margaret Score: 10    Last vitals: Reviewed and per EMR flowsheets.        Anesthesia Post Evaluation    Level of consciousness: awake  Complications: no

## 2020-11-02 NOTE — PROGRESS NOTES
Pt arrived from OR to PACU bay 8. Report received from OR staff. Pt arouses easily to voice. Surgical incisions viewed/dressing in place to nose. Head elevated, ice applied. 4L Simple mask. NSR, VSS. Will continue to monitor.

## 2020-11-02 NOTE — BRIEF OP NOTE
Brief Postoperative Note      Patient: Keron Stone  YOB: 1945  MRN: [id number]    Date of Procedure: 11/2/2020    Pre-Op Diagnosis: J33.9  RIGHT NASAL POLYP  R04.0  RECURRENT EPISTAXIS    Post-Op Diagnosis: sane       Procedure(s):  FUNCTIONAL ENDOSCOPIC SINUS SURGERY    Surgeon(s):  Zachary Oneill MD    Assistant:  First Assistant: Sirena Olvera    Anesthesia: General    Estimated Blood Loss (mL): 50 ml    Complications: none  Specimens:   ID Type Source Tests Collected by Time Destination   A :  Tissue Tissue SURGICAL PATHOLOGY Zachary Oneill MD 11/2/2020 9200    B :  Tissue Tissue SURGICAL PATHOLOGY Zachary Oneill MD 11/2/2020 9500    C :  Tissue Tissue SURGICAL PATHOLOGY Zachary Oneill MD 11/2/2020 0448    D :  Tissue Tissue SURGICAL PATHOLOGY Zachary Oneill MD 11/2/2020 1185        Implants:  none      Drains: none    Findings: right pansinusitis with mass likely [id number]    Electronically signed by Emery Osborn MD on 11/2/2020 at 8:25 AM

## 2020-11-02 NOTE — PROGRESS NOTES
CLINICAL PHARMACY NOTE: [address] Select Patient?: No  Total # of Prescriptions Filled: 3   The following medications were delivered to the patient:  · Keflex 500mg  · Medrol dosepack 4mg  · Norco 5-325mg  Total # of Interventions Completed: 0  Time Spent (min): 30    Additional Documentation:    Delivered to Patient wife    Cristina Cameron [facility]

## 2020-11-02 NOTE — PROGRESS NOTES
Pt awake and alert. RA, VSS. Family at bedside. Pt pain 2/10, tolerating PO. Pt meets criteria to be discharged from phase 1.

## 2020-11-02 NOTE — OP NOTE
[facility]                     [address] [facility], [address] [facility]                                OPERATIVE REPORT    PATIENT NAME: Jayde Rushing                   :        1945  MED REC NO:   [id number]                          ROOM:  ACCOUNT NO:   [de-identified]                           ADMIT DATE: 2020  PROVIDER:     Bettie Dias. Marisa Scruggs MD    DATE OF PROCEDURE:  2020    PREOPERATIVE DIAGNOSES:  Right pansinusitis with nasal polyposis  bilateral.    POSTOPERATIVE DIAGNOSES:  Right pansinusitis with nasal polyposis  bilateral.    OPERATION PERFORMED:  Functional endoscopic sinus surgery bilateral with  primarily right side, on the left side it was anterior ethmoid and  maxillary sinus, on the right side it was anterior and posterior  ethmoidectomies, sphenoid sinusotomies, maxillary antrostomy, fontal  sinus ostium enlargement and removal of large polypoid tissue. SURGEON:  Bettie Dias. Marisa Scruggs MD    ANESTHESIA:  General with endotracheal tube. OPERATIVE PROCEDURE:  The adequately premedicated patient was brought to  the operating room and placed in a supine position. Anesthesia was  induced to satisfactory level with endotracheal tube in position. The  patient was draped in the usual fashion. The patient has a history of  extensive difficulty breathing through the right side of his nose with  recurring epistaxis. Examination revealed what appeared to be a large  polyp and CT scanning was demonstrating probably the same with the  possibility of more serious mass. The nose was injected with 1%  Xylocaine with epinephrine 1:100,000 with 6 mL utilized. Cocaine  dampened pledgets of 4% solution were placed on the right side with two  and on the left side one. After a 10-minute interval, the procedure was  initiated. The packing was removed from the nose. The left side was  observed first with a 0 degree scope.   There appeared to be some small  polypoid lesion in the middle meatus on the left side and this was  carefully removed and sent to pathology for evaluation coming from  apparently the anterior ethmoid and the maxillary sinus area. Otherwise, this side appeared unremarkable. The Afrin pledget was  inserted and attention was drawn to the right side. A 0 degree scope  was then inserted and a very large mass was identified inferiorly  obstructing the nasal cavity. The middle turbinate was reflected  medially and the infundibulum was exposed. The infundibulum was  removed. The agger nasi cells removed. Anterior and posterior  ethmoidal cells were cleared of polypoid tissue. The natural ostium of  the maxillary sinus was identified and enlarged and the mass/lesion was  identified and taken from the area and sent separately to pathology for  evaluation. Once again, the lesion on the floor of the nose was removed  in pieces and sent separately to pathology indicating that might be  coming from the nasopharynx. This was all removed and an Afrin pledget  was inserted. During the procedure, there was no evidence of any  intraoperative complication. Pledget was removed from both sides. Further cleaning of tissue from the right side was removed as well as on  the left side. A good airway was thus obtained. Suctioning was then  carefully performed. Once again, there was no evidence of  intraoperative complication. Under direct visualization, no further  pathology was identified. Ernesto packs impregnated with Polysporin  ointment one on each side in the middle meatus was placed. Due to the  previous problem with epistaxis, it was determined that Obrien splints  with nasal tubing should be inserted on both sides. This was  accomplished. Total bleeding was approximately 50 mL. Suctioning was  again performed orally. The strings were brought to the outside of the  nose, trimmed and taped to the outside of the nose.   Mustache dressing  was applied. The patient tolerated the procedure well and was sent to  the recovery room in good condition. Madeleine Willams MD    D: 11/02/2020 13:42:57       T: 11/02/2020 16:31:10     [id number]  Job#: [id number]     Doc#: [id number]    CC:  During the course of the procedure, there was evidence on the right side of obstruction of the frontal and sphenoid sinuses both of which were cleared of debris.

## 2020-11-02 NOTE — PROGRESS NOTES
Discharge instructions reviewed with patient/responsible adult. All home medications have been reviewed, questions answered and patient verbalized understanding. Discharge instructions signed and copies given. Patient discharged with belongings and 3 prescriptions.

## 2020-11-03 ENCOUNTER — OFFICE VISIT (OUTPATIENT)
Dept: ENT CLINIC | Age: 75
End: 2020-11-03

## 2020-11-03 VITALS
SYSTOLIC BLOOD PRESSURE: 141 MMHG | DIASTOLIC BLOOD PRESSURE: 77 MMHG | HEART RATE: 72 BPM | TEMPERATURE: 98.2 F | WEIGHT: 196 LBS | BODY MASS INDEX: 28.94 KG/M2

## 2020-11-03 PROCEDURE — 99999 PR OFFICE/OUTPT VISIT,PROCEDURE ONLY: CPT | Performed by: OTOLARYNGOLOGY

## 2020-11-03 NOTE — PROGRESS NOTES
Patient is doing quite well following removal of a huge polyp in the right side of his nose which may possibly be malignant or at least an inverting papilloma. No excessive bleeding has been noted. Packs are removed and once again no excessive bleeding is occurring. He is instructed on rinsing and use of saline spray. He is told activity levels for the next 24 hours and I will see him again in 1 week.

## 2020-11-10 ENCOUNTER — OFFICE VISIT (OUTPATIENT)
Dept: ENT CLINIC | Age: 75
End: 2020-11-10

## 2020-11-10 VITALS
WEIGHT: 193 LBS | HEIGHT: 69 IN | TEMPERATURE: 98.9 F | SYSTOLIC BLOOD PRESSURE: 133 MMHG | DIASTOLIC BLOOD PRESSURE: 79 MMHG | RESPIRATION RATE: 20 BRPM | HEART RATE: 78 BPM | BODY MASS INDEX: 28.58 KG/M2

## 2020-11-10 PROCEDURE — 99999 PR OFFICE/OUTPT VISIT,PROCEDURE ONLY: CPT | Performed by: OTOLARYNGOLOGY

## 2020-11-10 NOTE — PROGRESS NOTES
Patient is here 1 week following his function endoscopic sinus surgery for removal of a large polyp on the right side of his nose along with pansinus disease. He has done reasonably well but is having some crusting which would be likely. He is using the sinus wash with success. Fevers been noted and no other applications have been encountered. The nose is cleaned and suctioned. There is a good airway present. I see no other abnormal tissue within the area. However, the pathologist did contact me personally and described this as a malignancy but it is undergoing further analysis as to the exact type. I have discussed the issue with the patient and his wife. It is undoubtedly something that will require radiation therapy and perhaps chemotherapy depending upon the final result. I will contact him as soon as I receive the final diagnosis to determine the next step but I will see him again in 1 week for sure.

## 2020-11-16 ENCOUNTER — TELEPHONE (OUTPATIENT)
Dept: OTOLARYNGOLOGY | Age: 75
End: 2020-11-16

## 2020-11-16 NOTE — TELEPHONE ENCOUNTER
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 He will make an appointment to see me sometime later this week or early next week. We will arrange for radiation therapy referral.  He is agreeable.

## 2020-11-19 ENCOUNTER — OFFICE VISIT (OUTPATIENT)
Dept: ENT CLINIC | Age: 75
End: 2020-11-19
Payer: MEDICARE

## 2020-11-19 VITALS
RESPIRATION RATE: 18 BRPM | TEMPERATURE: 97.1 F | HEIGHT: 69 IN | BODY MASS INDEX: 29.47 KG/M2 | DIASTOLIC BLOOD PRESSURE: 73 MMHG | WEIGHT: 199 LBS | SYSTOLIC BLOOD PRESSURE: 133 MMHG | OXYGEN SATURATION: 95 %

## 2020-11-19 PROCEDURE — 10060 I&D ABSCESS SIMPLE/SINGLE: CPT | Performed by: OTOLARYNGOLOGY

## 2020-11-19 RX ORDER — CEPHALEXIN 500 MG/1
500 CAPSULE ORAL 3 TIMES DAILY
Qty: 30 CAPSULE | Refills: 0 | Status: SHIPPED | OUTPATIENT
Start: 2020-11-19 | End: 2020-01-01

## 2020-11-19 NOTE — PROGRESS NOTES
Patient is scheduled for his radiation therapy session today. He has been doing reasonably well following removal of the lesion in his right nostril. He does still have debris that is removed. His airway otherwise is excellent. However in the last few days, he has noted a lesion in the left eye area just medial to the canthus and somewhat superiorly. He had had apparently a small pimple area previously. This area suddenly enlarged and was somewhat tender. Does have the appearance of a small abscess. Area is cleaned and then injected with approximately 1 mL of 1% Xylocaine with epinephrine 1-100,000. Small incision is made in the crease line overlying it. The area itself is approximately 1 cm in diameter. Approximately 1 mL of purulent material was exuded and was cultured. The area is then treated with Polysporin ointment which we applied twice daily while he is at home. We will start him on Keflex pending the results of the culture.

## 2020-11-21 LAB
GRAM STAIN RESULT: NORMAL
WOUND/ABSCESS: NORMAL

## 2020-12-03 NOTE — PROGRESS NOTES
He feels much better and has had no bleeding from the right nostril. There does remain some drainage which is highly normal.  There appears to be a delay in his radiation initiation due to a problem with availability of PET scanning. There has been no fever. Currently, he appears in no distress. Ear examination appears unremarkable as it has been on both sides. The oral examination is unremarkable as well and the neck is free of any adenopathy, mass, thyroid enlargement. There is some crusting and drainage from his right nostril which is consistent with his therapy. 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 Due to the fact that there is a delay in initiation of treatment, culture and sensitivity is taken from the area and will start him on Levaquin as well as a Medrol Dosepak.

## 2021-01-01 ENCOUNTER — TELEPHONE (OUTPATIENT)
Dept: PRIMARY CARE CLINIC | Age: 76
End: 2021-01-01

## 2021-01-01 ENCOUNTER — TELEPHONE (OUTPATIENT)
Dept: PRIMARY CARE CLINIC | Age: 76
End: 2021-01-01
Payer: MEDICARE

## 2021-01-01 ENCOUNTER — APPOINTMENT (OUTPATIENT)
Dept: INTERVENTIONAL RADIOLOGY/VASCULAR | Age: 76
DRG: 853 | End: 2021-01-01
Payer: MEDICARE

## 2021-01-01 ENCOUNTER — OFFICE VISIT (OUTPATIENT)
Dept: PRIMARY CARE CLINIC | Age: 76
End: 2021-01-01
Payer: MEDICARE

## 2021-01-01 ENCOUNTER — APPOINTMENT (OUTPATIENT)
Dept: CT IMAGING | Age: 76
DRG: 853 | End: 2021-01-01
Payer: MEDICARE

## 2021-01-01 ENCOUNTER — OFFICE VISIT (OUTPATIENT)
Dept: ENT CLINIC | Age: 76
End: 2021-01-01
Payer: MEDICARE

## 2021-01-01 ENCOUNTER — HOSPITAL ENCOUNTER (OUTPATIENT)
Age: 76
Setting detail: OUTPATIENT SURGERY
Discharge: HOME | End: 2021-04-12
Attending: OTOLARYNGOLOGY | Admitting: OTOLARYNGOLOGY
Payer: MEDICARE

## 2021-01-01 ENCOUNTER — ANESTHESIA EVENT (OUTPATIENT)
Dept: ENDOSCOPY | Age: 76
DRG: 853 | End: 2021-01-01
Payer: MEDICARE

## 2021-01-01 ENCOUNTER — ANESTHESIA (OUTPATIENT)
Dept: ENDOSCOPY | Age: 76
DRG: 853 | End: 2021-01-01
Payer: MEDICARE

## 2021-01-01 ENCOUNTER — APPOINTMENT (OUTPATIENT)
Dept: GENERAL RADIOLOGY | Age: 76
End: 2021-01-01
Payer: MEDICARE

## 2021-01-01 ENCOUNTER — OFFICE VISIT (OUTPATIENT)
Dept: ENT CLINIC | Age: 76
End: 2021-01-01

## 2021-01-01 ENCOUNTER — HOSPITAL ENCOUNTER (OUTPATIENT)
Age: 76
Setting detail: OBSERVATION
Discharge: HOSPICE | End: 2021-11-07
Attending: EMERGENCY MEDICINE | Admitting: HOSPITALIST
Payer: MEDICARE

## 2021-01-01 ENCOUNTER — HOSPITAL ENCOUNTER (INPATIENT)
Age: 76
LOS: 10 days | Discharge: HOME HEALTH | DRG: 853 | End: 2021-09-17
Attending: EMERGENCY MEDICINE | Admitting: INTERNAL MEDICINE
Payer: MEDICARE

## 2021-01-01 ENCOUNTER — APPOINTMENT (OUTPATIENT)
Dept: CT IMAGING | Age: 76
End: 2021-01-01
Payer: MEDICARE

## 2021-01-01 ENCOUNTER — OFFICE VISIT (OUTPATIENT)
Dept: CARDIOLOGY CLINIC | Age: 76
End: 2021-01-01
Payer: MEDICARE

## 2021-01-01 ENCOUNTER — TELEPHONE (OUTPATIENT)
Dept: ENT CLINIC | Age: 76
End: 2021-01-01

## 2021-01-01 ENCOUNTER — APPOINTMENT (OUTPATIENT)
Dept: MRI IMAGING | Age: 76
DRG: 853 | End: 2021-01-01
Payer: MEDICARE

## 2021-01-01 ENCOUNTER — ANESTHESIA (OUTPATIENT)
Dept: OPERATING ROOM | Age: 76
End: 2021-01-01
Payer: MEDICARE

## 2021-01-01 ENCOUNTER — ANESTHESIA EVENT (OUTPATIENT)
Dept: OPERATING ROOM | Age: 76
End: 2021-01-01
Payer: MEDICARE

## 2021-01-01 ENCOUNTER — APPOINTMENT (OUTPATIENT)
Dept: ULTRASOUND IMAGING | Age: 76
DRG: 853 | End: 2021-01-01
Payer: MEDICARE

## 2021-01-01 ENCOUNTER — PATIENT MESSAGE (OUTPATIENT)
Dept: ENT CLINIC | Age: 76
End: 2021-01-01

## 2021-01-01 ENCOUNTER — APPOINTMENT (OUTPATIENT)
Dept: GENERAL RADIOLOGY | Age: 76
DRG: 853 | End: 2021-01-01
Payer: MEDICARE

## 2021-01-01 VITALS
OXYGEN SATURATION: 98 % | SYSTOLIC BLOOD PRESSURE: 133 MMHG | RESPIRATION RATE: 16 BRPM | DIASTOLIC BLOOD PRESSURE: 88 MMHG | BODY MASS INDEX: 27.27 KG/M2 | HEART RATE: 94 BPM | WEIGHT: 184.1 LBS | HEIGHT: 69 IN | TEMPERATURE: 98.1 F

## 2021-01-01 VITALS — SYSTOLIC BLOOD PRESSURE: 108 MMHG | HEART RATE: 56 BPM | TEMPERATURE: 97.8 F | DIASTOLIC BLOOD PRESSURE: 76 MMHG

## 2021-01-01 VITALS
BODY MASS INDEX: 24.94 KG/M2 | HEIGHT: 69 IN | TEMPERATURE: 97.4 F | HEIGHT: 69 IN | DIASTOLIC BLOOD PRESSURE: 72 MMHG | WEIGHT: 168 LBS | RESPIRATION RATE: 18 BRPM | OXYGEN SATURATION: 97 % | HEART RATE: 62 BPM | BODY MASS INDEX: 24.88 KG/M2 | SYSTOLIC BLOOD PRESSURE: 106 MMHG | DIASTOLIC BLOOD PRESSURE: 61 MMHG | SYSTOLIC BLOOD PRESSURE: 130 MMHG | HEART RATE: 50 BPM | OXYGEN SATURATION: 94 % | WEIGHT: 168.4 LBS

## 2021-01-01 VITALS — HEART RATE: 80 BPM | DIASTOLIC BLOOD PRESSURE: 76 MMHG | TEMPERATURE: 97.1 F | SYSTOLIC BLOOD PRESSURE: 113 MMHG

## 2021-01-01 VITALS
HEART RATE: 69 BPM | DIASTOLIC BLOOD PRESSURE: 85 MMHG | SYSTOLIC BLOOD PRESSURE: 157 MMHG | RESPIRATION RATE: 19 BRPM | HEIGHT: 69 IN | TEMPERATURE: 97.6 F | WEIGHT: 198 LBS | OXYGEN SATURATION: 95 % | BODY MASS INDEX: 29.33 KG/M2

## 2021-01-01 VITALS
RESPIRATION RATE: 24 BRPM | HEART RATE: 61 BPM | OXYGEN SATURATION: 97 % | BODY MASS INDEX: 24.88 KG/M2 | SYSTOLIC BLOOD PRESSURE: 132 MMHG | WEIGHT: 168 LBS | DIASTOLIC BLOOD PRESSURE: 78 MMHG | HEIGHT: 69 IN

## 2021-01-01 VITALS
HEART RATE: 70 BPM | BODY MASS INDEX: 30.16 KG/M2 | SYSTOLIC BLOOD PRESSURE: 150 MMHG | TEMPERATURE: 98 F | OXYGEN SATURATION: 97 % | HEIGHT: 68 IN | WEIGHT: 199 LBS | DIASTOLIC BLOOD PRESSURE: 78 MMHG

## 2021-01-01 VITALS
HEART RATE: 89 BPM | WEIGHT: 197 LBS | OXYGEN SATURATION: 99 % | SYSTOLIC BLOOD PRESSURE: 138 MMHG | DIASTOLIC BLOOD PRESSURE: 82 MMHG | BODY MASS INDEX: 29.95 KG/M2

## 2021-01-01 VITALS
BODY MASS INDEX: 35.97 KG/M2 | RESPIRATION RATE: 18 BRPM | TEMPERATURE: 98.2 F | HEIGHT: 63 IN | OXYGEN SATURATION: 98 % | WEIGHT: 203 LBS

## 2021-01-01 VITALS — DIASTOLIC BLOOD PRESSURE: 71 MMHG | SYSTOLIC BLOOD PRESSURE: 117 MMHG | TEMPERATURE: 97.5 F | HEART RATE: 51 BPM

## 2021-01-01 VITALS
BODY MASS INDEX: 22.5 KG/M2 | RESPIRATION RATE: 18 BRPM | OXYGEN SATURATION: 97 % | TEMPERATURE: 98.2 F | WEIGHT: 127 LBS | HEART RATE: 46 BPM

## 2021-01-01 VITALS
HEART RATE: 72 BPM | SYSTOLIC BLOOD PRESSURE: 118 MMHG | BODY MASS INDEX: 25.99 KG/M2 | DIASTOLIC BLOOD PRESSURE: 82 MMHG | TEMPERATURE: 96.3 F | WEIGHT: 176 LBS | OXYGEN SATURATION: 98 %

## 2021-01-01 VITALS
HEART RATE: 58 BPM | TEMPERATURE: 98.1 F | DIASTOLIC BLOOD PRESSURE: 67 MMHG | SYSTOLIC BLOOD PRESSURE: 123 MMHG | WEIGHT: 175 LBS | BODY MASS INDEX: 25.84 KG/M2

## 2021-01-01 VITALS
DIASTOLIC BLOOD PRESSURE: 62 MMHG | BODY MASS INDEX: 29.55 KG/M2 | OXYGEN SATURATION: 98 % | HEIGHT: 68 IN | HEART RATE: 52 BPM | SYSTOLIC BLOOD PRESSURE: 124 MMHG | WEIGHT: 195 LBS

## 2021-01-01 VITALS
WEIGHT: 173 LBS | HEART RATE: 68 BPM | SYSTOLIC BLOOD PRESSURE: 113 MMHG | DIASTOLIC BLOOD PRESSURE: 73 MMHG | BODY MASS INDEX: 25.55 KG/M2 | TEMPERATURE: 98.9 F

## 2021-01-01 VITALS — DIASTOLIC BLOOD PRESSURE: 77 MMHG | TEMPERATURE: 97.1 F | SYSTOLIC BLOOD PRESSURE: 134 MMHG | HEART RATE: 80 BPM

## 2021-01-01 VITALS
SYSTOLIC BLOOD PRESSURE: 102 MMHG | RESPIRATION RATE: 20 BRPM | DIASTOLIC BLOOD PRESSURE: 63 MMHG | OXYGEN SATURATION: 98 %

## 2021-01-01 VITALS — DIASTOLIC BLOOD PRESSURE: 81 MMHG | HEART RATE: 75 BPM | SYSTOLIC BLOOD PRESSURE: 134 MMHG | TEMPERATURE: 97.7 F

## 2021-01-01 VITALS
TEMPERATURE: 98.6 F | SYSTOLIC BLOOD PRESSURE: 132 MMHG | DIASTOLIC BLOOD PRESSURE: 80 MMHG | HEART RATE: 50 BPM | OXYGEN SATURATION: 97 % | WEIGHT: 185 LBS | BODY MASS INDEX: 28.13 KG/M2

## 2021-01-01 VITALS
RESPIRATION RATE: 13 BRPM | DIASTOLIC BLOOD PRESSURE: 91 MMHG | OXYGEN SATURATION: 100 % | SYSTOLIC BLOOD PRESSURE: 154 MMHG | TEMPERATURE: 95 F

## 2021-01-01 VITALS
DIASTOLIC BLOOD PRESSURE: 66 MMHG | HEART RATE: 58 BPM | SYSTOLIC BLOOD PRESSURE: 129 MMHG | TEMPERATURE: 97.7 F | OXYGEN SATURATION: 95 %

## 2021-01-01 DIAGNOSIS — A41.9 SEPTICEMIA (HCC): ICD-10-CM

## 2021-01-01 DIAGNOSIS — I25.10 CORONARY ARTERY DISEASE INVOLVING NATIVE CORONARY ARTERY OF NATIVE HEART WITHOUT ANGINA PECTORIS: ICD-10-CM

## 2021-01-01 DIAGNOSIS — C30.0: Primary | ICD-10-CM

## 2021-01-01 DIAGNOSIS — K30 INDIGESTION: ICD-10-CM

## 2021-01-01 DIAGNOSIS — J30.9 ALLERGIC SINUSITIS: ICD-10-CM

## 2021-01-01 DIAGNOSIS — I25.10 CORONARY ARTERY DISEASE INVOLVING NATIVE HEART WITHOUT ANGINA PECTORIS, UNSPECIFIED VESSEL OR LESION TYPE: Primary | ICD-10-CM

## 2021-01-01 DIAGNOSIS — C79.51 SPINE METASTASIS (HCC): ICD-10-CM

## 2021-01-01 DIAGNOSIS — E78.5 HYPERLIPIDEMIA, UNSPECIFIED HYPERLIPIDEMIA TYPE: ICD-10-CM

## 2021-01-01 DIAGNOSIS — C31.9 CANCER OF NASAL CAVITY AND SINUS (HCC): Primary | ICD-10-CM

## 2021-01-01 DIAGNOSIS — M84.48XA PATHOLOGIC THORACIC FRACTURE, INITIAL ENCOUNTER: Primary | ICD-10-CM

## 2021-01-01 DIAGNOSIS — I16.0 HYPERTENSIVE URGENCY: ICD-10-CM

## 2021-01-01 DIAGNOSIS — Z92.3 S/P RADIATION THERAPY: ICD-10-CM

## 2021-01-01 DIAGNOSIS — C31.9 CANCER OF SINUS (HCC): ICD-10-CM

## 2021-01-01 DIAGNOSIS — K21.9 GASTROESOPHAGEAL REFLUX DISEASE WITHOUT ESOPHAGITIS: ICD-10-CM

## 2021-01-01 DIAGNOSIS — M84.48XD PATHOLOGICAL FRACTURE, OTHER SITE, SUBSEQUENT ENCOUNTER FOR FRACTURE WITH ROUTINE HEALING: ICD-10-CM

## 2021-01-01 DIAGNOSIS — Z01.818 PREOP EXAMINATION: ICD-10-CM

## 2021-01-01 DIAGNOSIS — K76.0 FATTY LIVER: ICD-10-CM

## 2021-01-01 DIAGNOSIS — Z46.6 FITTING AND ADJUSTMENT OF URINARY DEVICE: ICD-10-CM

## 2021-01-01 DIAGNOSIS — C30.0 CANCER OF NASAL CAVITY AND SINUS (HCC): Primary | ICD-10-CM

## 2021-01-01 DIAGNOSIS — C31.9 CANCER OF NASAL CAVITY AND SINUS (HCC): ICD-10-CM

## 2021-01-01 DIAGNOSIS — M89.8X1 SHOULDER BLADE PAIN: Primary | ICD-10-CM

## 2021-01-01 DIAGNOSIS — R04.0 ACUTE ANTERIOR EPISTAXIS: Primary | ICD-10-CM

## 2021-01-01 DIAGNOSIS — R04.0 EPISTAXIS: ICD-10-CM

## 2021-01-01 DIAGNOSIS — I71.40 ABDOMINAL AORTIC ANEURYSM (AAA) WITHOUT RUPTURE: ICD-10-CM

## 2021-01-01 DIAGNOSIS — C30.0 CANCER OF NASAL CAVITY AND SINUS (HCC): ICD-10-CM

## 2021-01-01 DIAGNOSIS — C79.52 SECONDARY MALIGNANT NEOPLASM OF BONE AND BONE MARROW (HCC): Primary | ICD-10-CM

## 2021-01-01 DIAGNOSIS — R33.8 OTHER RETENTION OF URINE: ICD-10-CM

## 2021-01-01 DIAGNOSIS — J32.9 RECURRENT SINUSITIS: ICD-10-CM

## 2021-01-01 DIAGNOSIS — I10 ESSENTIAL HYPERTENSION, BENIGN: ICD-10-CM

## 2021-01-01 DIAGNOSIS — Z00.00 ROUTINE GENERAL MEDICAL EXAMINATION AT A HEALTH CARE FACILITY: Primary | ICD-10-CM

## 2021-01-01 DIAGNOSIS — J34.2 NOSE SEPTUM DEVIATION: ICD-10-CM

## 2021-01-01 DIAGNOSIS — Z95.5 PRESENCE OF CORONARY ARTERY BYPASS GRAFT STENT: ICD-10-CM

## 2021-01-01 DIAGNOSIS — Z01.818 PREOP EXAMINATION: Primary | ICD-10-CM

## 2021-01-01 DIAGNOSIS — J33.9 NASAL POLYPOSIS: Primary | ICD-10-CM

## 2021-01-01 DIAGNOSIS — N13.30 HYDRONEPHROSIS, UNSPECIFIED HYDRONEPHROSIS TYPE: ICD-10-CM

## 2021-01-01 DIAGNOSIS — I71.40 ABDOMINAL AORTIC ANEURYSM WITHOUT RUPTURE: ICD-10-CM

## 2021-01-01 DIAGNOSIS — Z12.11 COLON CANCER SCREENING: Primary | ICD-10-CM

## 2021-01-01 DIAGNOSIS — N40.1 BENIGN LOCALIZED PROSTATIC HYPERPLASIA WITH LOWER URINARY TRACT SYMPTOMS (LUTS): ICD-10-CM

## 2021-01-01 DIAGNOSIS — C79.51 SECONDARY MALIGNANT NEOPLASM OF BONE AND BONE MARROW (HCC): Primary | ICD-10-CM

## 2021-01-01 DIAGNOSIS — C30.0 MALIGNANT NEOPLASM OF NASAL CAVITIES (HCC): ICD-10-CM

## 2021-01-01 DIAGNOSIS — H91.93 BILATERAL HEARING LOSS, UNSPECIFIED HEARING LOSS TYPE: ICD-10-CM

## 2021-01-01 DIAGNOSIS — Z20.828 EXPOSURE TO SARS-ASSOCIATED CORONAVIRUS: Primary | ICD-10-CM

## 2021-01-01 DIAGNOSIS — I10 ESSENTIAL HYPERTENSION: ICD-10-CM

## 2021-01-01 DIAGNOSIS — I25.10 ATHEROSCLEROSIS OF NATIVE CORONARY ARTERY OF NATIVE HEART WITHOUT ANGINA PECTORIS: ICD-10-CM

## 2021-01-01 DIAGNOSIS — M84.68XA PATHOLOGICAL FRACTURE OF VERTEBRA DUE TO OTHER DISEASE, INITIAL ENCOUNTER: ICD-10-CM

## 2021-01-01 DIAGNOSIS — Z95.1 PRESENCE OF CORONARY ARTERY BYPASS GRAFT STENT: ICD-10-CM

## 2021-01-01 DIAGNOSIS — R07.9 ACUTE CHEST PAIN: Primary | ICD-10-CM

## 2021-01-01 DIAGNOSIS — I25.10 CORONARY ARTERY DISEASE DUE TO LIPID RICH PLAQUE: Primary | ICD-10-CM

## 2021-01-01 DIAGNOSIS — R04.0 EPISTAXIS, RECURRENT: ICD-10-CM

## 2021-01-01 DIAGNOSIS — Z12.11 COLON CANCER SCREENING: ICD-10-CM

## 2021-01-01 DIAGNOSIS — C31.9 MALIGNANT NEOPLASM OF ACCESSORY SINUS, UNSPECIFIED (HCC): ICD-10-CM

## 2021-01-01 DIAGNOSIS — I25.5 ISCHEMIC CARDIOMYOPATHY: ICD-10-CM

## 2021-01-01 DIAGNOSIS — C31.9 CANCER OF SINUS (HCC): Primary | ICD-10-CM

## 2021-01-01 DIAGNOSIS — M84.48XA PATHOLOGIC THORACIC FRACTURE, INITIAL ENCOUNTER: ICD-10-CM

## 2021-01-01 DIAGNOSIS — I25.83 CORONARY ARTERY DISEASE DUE TO LIPID RICH PLAQUE: Primary | ICD-10-CM

## 2021-01-01 DIAGNOSIS — R33.9 URINARY RETENTION: ICD-10-CM

## 2021-01-01 LAB
A/G RATIO: 1.2 (ref 1.1–2.2)
A/G RATIO: 1.4 (ref 1.1–2.2)
A/G RATIO: 2.2 (ref 1.1–2.2)
ALBUMIN SERPL-MCNC: 3.9 G/DL (ref 3.4–5)
ALBUMIN SERPL-MCNC: 4.4 G/DL (ref 3.4–5)
ALBUMIN SERPL-MCNC: 4.6 G/DL (ref 3.4–5)
ALP BLD-CCNC: 128 U/L (ref 40–129)
ALP BLD-CCNC: 87 U/L (ref 40–129)
ALP BLD-CCNC: 95 U/L (ref 40–129)
ALT SERPL-CCNC: 22 U/L (ref 10–40)
ALT SERPL-CCNC: 28 U/L (ref 10–40)
ALT SERPL-CCNC: 34 U/L (ref 10–40)
AMYLASE: 61 U/L (ref 25–115)
ANION GAP SERPL CALCULATED.3IONS-SCNC: 10 MMOL/L (ref 3–16)
ANION GAP SERPL CALCULATED.3IONS-SCNC: 11 MMOL/L (ref 3–16)
ANION GAP SERPL CALCULATED.3IONS-SCNC: 12 MMOL/L (ref 3–16)
ANION GAP SERPL CALCULATED.3IONS-SCNC: 13 MMOL/L (ref 3–16)
ANION GAP SERPL CALCULATED.3IONS-SCNC: 17 MMOL/L (ref 3–16)
ANION GAP SERPL CALCULATED.3IONS-SCNC: 7 MMOL/L (ref 3–16)
ANION GAP SERPL CALCULATED.3IONS-SCNC: 7 MMOL/L (ref 3–16)
ANION GAP SERPL CALCULATED.3IONS-SCNC: 8 MMOL/L (ref 3–16)
ANION GAP SERPL CALCULATED.3IONS-SCNC: 9 MMOL/L (ref 3–16)
APTT: 31.6 SEC (ref 26.2–38.6)
APTT: 32 SEC (ref 26.2–38.6)
AST SERPL-CCNC: 18 U/L (ref 15–37)
AST SERPL-CCNC: 20 U/L (ref 15–37)
AST SERPL-CCNC: 26 U/L (ref 15–37)
BASE EXCESS VENOUS: -3.5 MMOL/L (ref -3–3)
BASOPHILS ABSOLUTE: 0 K/UL (ref 0–0.2)
BASOPHILS ABSOLUTE: 0.1 K/UL (ref 0–0.2)
BASOPHILS RELATIVE PERCENT: 0.2 %
BASOPHILS RELATIVE PERCENT: 0.3 %
BASOPHILS RELATIVE PERCENT: 0.4 %
BASOPHILS RELATIVE PERCENT: 0.4 %
BASOPHILS RELATIVE PERCENT: 0.5 %
BASOPHILS RELATIVE PERCENT: 0.6 %
BASOPHILS RELATIVE PERCENT: 0.7 %
BASOPHILS RELATIVE PERCENT: 0.7 %
BASOPHILS RELATIVE PERCENT: 0.9 %
BASOPHILS RELATIVE PERCENT: 0.9 %
BILIRUB SERPL-MCNC: 0.3 MG/DL (ref 0–1)
BILIRUB SERPL-MCNC: 0.4 MG/DL (ref 0–1)
BILIRUB SERPL-MCNC: 0.6 MG/DL (ref 0–1)
BILIRUBIN URINE: NEGATIVE
BILIRUBIN URINE: NEGATIVE
BLOOD CULTURE, ROUTINE: NORMAL
BLOOD, URINE: NEGATIVE
BLOOD, URINE: NEGATIVE
BUN BLDV-MCNC: 10 MG/DL (ref 7–20)
BUN BLDV-MCNC: 10 MG/DL (ref 7–20)
BUN BLDV-MCNC: 11 MG/DL (ref 7–20)
BUN BLDV-MCNC: 14 MG/DL (ref 7–20)
BUN BLDV-MCNC: 14 MG/DL (ref 7–20)
BUN BLDV-MCNC: 15 MG/DL (ref 7–20)
BUN BLDV-MCNC: 15 MG/DL (ref 7–20)
BUN BLDV-MCNC: 16 MG/DL (ref 7–20)
BUN BLDV-MCNC: 16 MG/DL (ref 7–20)
BUN BLDV-MCNC: 19 MG/DL (ref 7–20)
BUN BLDV-MCNC: 19 MG/DL (ref 7–20)
BUN BLDV-MCNC: 20 MG/DL (ref 7–20)
BUN BLDV-MCNC: 21 MG/DL (ref 7–20)
BUN BLDV-MCNC: 8 MG/DL (ref 7–20)
BUN BLDV-MCNC: 9 MG/DL (ref 7–20)
CALCIUM SERPL-MCNC: 10.1 MG/DL (ref 8.3–10.6)
CALCIUM SERPL-MCNC: 7.9 MG/DL (ref 8.3–10.6)
CALCIUM SERPL-MCNC: 8 MG/DL (ref 8.3–10.6)
CALCIUM SERPL-MCNC: 8 MG/DL (ref 8.3–10.6)
CALCIUM SERPL-MCNC: 8.3 MG/DL (ref 8.3–10.6)
CALCIUM SERPL-MCNC: 8.4 MG/DL (ref 8.3–10.6)
CALCIUM SERPL-MCNC: 8.4 MG/DL (ref 8.3–10.6)
CALCIUM SERPL-MCNC: 8.6 MG/DL (ref 8.3–10.6)
CALCIUM SERPL-MCNC: 8.8 MG/DL (ref 8.3–10.6)
CALCIUM SERPL-MCNC: 8.8 MG/DL (ref 8.3–10.6)
CALCIUM SERPL-MCNC: 9.1 MG/DL (ref 8.3–10.6)
CALCIUM SERPL-MCNC: 9.5 MG/DL (ref 8.3–10.6)
CALCIUM SERPL-MCNC: 9.5 MG/DL (ref 8.3–10.6)
CARBOXYHEMOGLOBIN: 4.5 % (ref 0–1.5)
CHLORIDE BLD-SCNC: 100 MMOL/L (ref 99–110)
CHLORIDE BLD-SCNC: 102 MMOL/L (ref 99–110)
CHLORIDE BLD-SCNC: 103 MMOL/L (ref 99–110)
CHLORIDE BLD-SCNC: 105 MMOL/L (ref 99–110)
CHLORIDE BLD-SCNC: 98 MMOL/L (ref 99–110)
CHLORIDE BLD-SCNC: 99 MMOL/L (ref 99–110)
CHOLESTEROL, TOTAL: 142 MG/DL (ref 0–199)
CHOLESTEROL, TOTAL: 152 MG/DL (ref 0–199)
CLARITY: CLEAR
CLARITY: CLEAR
CO2: 19 MMOL/L (ref 21–32)
CO2: 20 MMOL/L (ref 21–32)
CO2: 20 MMOL/L (ref 21–32)
CO2: 21 MMOL/L (ref 21–32)
CO2: 22 MMOL/L (ref 21–32)
CO2: 24 MMOL/L (ref 21–32)
CO2: 24 MMOL/L (ref 21–32)
CO2: 26 MMOL/L (ref 21–32)
COLOR: YELLOW
COLOR: YELLOW
CREAT SERPL-MCNC: 0.6 MG/DL (ref 0.8–1.3)
CREAT SERPL-MCNC: 0.7 MG/DL (ref 0.8–1.3)
CREAT SERPL-MCNC: 0.8 MG/DL (ref 0.8–1.3)
CREAT SERPL-MCNC: 0.8 MG/DL (ref 0.8–1.3)
CREAT SERPL-MCNC: 0.9 MG/DL (ref 0.8–1.3)
CREAT SERPL-MCNC: 1.1 MG/DL (ref 0.8–1.3)
CULTURE, BLOOD 2: NORMAL
EKG ATRIAL RATE: 105 BPM
EKG ATRIAL RATE: 61 BPM
EKG ATRIAL RATE: 63 BPM
EKG ATRIAL RATE: 66 BPM
EKG DIAGNOSIS: NORMAL
EKG P AXIS: 28 DEGREES
EKG P AXIS: 40 DEGREES
EKG P AXIS: 43 DEGREES
EKG P AXIS: 57 DEGREES
EKG P-R INTERVAL: 138 MS
EKG P-R INTERVAL: 148 MS
EKG P-R INTERVAL: 152 MS
EKG P-R INTERVAL: 166 MS
EKG Q-T INTERVAL: 340 MS
EKG Q-T INTERVAL: 434 MS
EKG Q-T INTERVAL: 482 MS
EKG Q-T INTERVAL: 498 MS
EKG QRS DURATION: 104 MS
EKG QRS DURATION: 106 MS
EKG QRS DURATION: 112 MS
EKG QRS DURATION: 96 MS
EKG QTC CALCULATION (BAZETT): 444 MS
EKG QTC CALCULATION (BAZETT): 449 MS
EKG QTC CALCULATION (BAZETT): 485 MS
EKG QTC CALCULATION (BAZETT): 522 MS
EKG R AXIS: -36 DEGREES
EKG R AXIS: -46 DEGREES
EKG R AXIS: -50 DEGREES
EKG R AXIS: -50 DEGREES
EKG T AXIS: -80 DEGREES
EKG T AXIS: 265 DEGREES
EKG T AXIS: 85 DEGREES
EKG T AXIS: 99 DEGREES
EKG VENTRICULAR RATE: 105 BPM
EKG VENTRICULAR RATE: 61 BPM
EKG VENTRICULAR RATE: 63 BPM
EKG VENTRICULAR RATE: 66 BPM
EOSINOPHILS ABSOLUTE: 0 K/UL (ref 0–0.6)
EOSINOPHILS ABSOLUTE: 0 K/UL (ref 0–0.6)
EOSINOPHILS ABSOLUTE: 0.1 K/UL (ref 0–0.6)
EOSINOPHILS ABSOLUTE: 0.2 K/UL (ref 0–0.6)
EOSINOPHILS ABSOLUTE: 0.3 K/UL (ref 0–0.6)
EOSINOPHILS ABSOLUTE: 0.3 K/UL (ref 0–0.6)
EOSINOPHILS ABSOLUTE: 0.4 K/UL (ref 0–0.6)
EOSINOPHILS RELATIVE PERCENT: 0 %
EOSINOPHILS RELATIVE PERCENT: 0 %
EOSINOPHILS RELATIVE PERCENT: 0.4 %
EOSINOPHILS RELATIVE PERCENT: 0.5 %
EOSINOPHILS RELATIVE PERCENT: 1.2 %
EOSINOPHILS RELATIVE PERCENT: 1.5 %
EOSINOPHILS RELATIVE PERCENT: 1.8 %
EOSINOPHILS RELATIVE PERCENT: 2.1 %
EOSINOPHILS RELATIVE PERCENT: 2.2 %
EOSINOPHILS RELATIVE PERCENT: 2.4 %
EOSINOPHILS RELATIVE PERCENT: 3.3 %
EOSINOPHILS RELATIVE PERCENT: 3.4 %
EOSINOPHILS RELATIVE PERCENT: 3.7 %
EOSINOPHILS RELATIVE PERCENT: 3.9 %
EPITHELIAL CELLS, UA: 0 /HPF (ref 0–5)
FERRITIN: 459.3 NG/ML (ref 30–400)
GFR AFRICAN AMERICAN: >60
GFR NON-AFRICAN AMERICAN: >60
GLOBULIN: 2.1 G/DL
GLOBULIN: 3.6 G/DL
GLUCOSE BLD-MCNC: 113 MG/DL (ref 70–99)
GLUCOSE BLD-MCNC: 116 MG/DL (ref 70–99)
GLUCOSE BLD-MCNC: 119 MG/DL (ref 70–99)
GLUCOSE BLD-MCNC: 123 MG/DL (ref 70–99)
GLUCOSE BLD-MCNC: 125 MG/DL (ref 70–99)
GLUCOSE BLD-MCNC: 125 MG/DL (ref 70–99)
GLUCOSE BLD-MCNC: 127 MG/DL (ref 70–99)
GLUCOSE BLD-MCNC: 129 MG/DL (ref 70–99)
GLUCOSE BLD-MCNC: 133 MG/DL (ref 70–99)
GLUCOSE BLD-MCNC: 140 MG/DL (ref 70–99)
GLUCOSE BLD-MCNC: 155 MG/DL (ref 70–99)
GLUCOSE BLD-MCNC: 156 MG/DL (ref 70–99)
GLUCOSE BLD-MCNC: 196 MG/DL (ref 70–99)
GLUCOSE BLD-MCNC: 203 MG/DL (ref 70–99)
GLUCOSE BLD-MCNC: 237 MG/DL (ref 70–99)
GLUCOSE URINE: 100 MG/DL
GLUCOSE URINE: NEGATIVE MG/DL
HCO3 VENOUS: 19.9 MMOL/L (ref 23–29)
HCT VFR BLD CALC: 24.6 % (ref 40.5–52.5)
HCT VFR BLD CALC: 26 % (ref 40.5–52.5)
HCT VFR BLD CALC: 26.5 % (ref 40.5–52.5)
HCT VFR BLD CALC: 26.7 % (ref 40.5–52.5)
HCT VFR BLD CALC: 29 % (ref 40.5–52.5)
HCT VFR BLD CALC: 29.3 % (ref 40.5–52.5)
HCT VFR BLD CALC: 29.7 % (ref 40.5–52.5)
HCT VFR BLD CALC: 31.4 % (ref 40.5–52.5)
HCT VFR BLD CALC: 31.4 % (ref 40.5–52.5)
HCT VFR BLD CALC: 32.7 % (ref 40.5–52.5)
HCT VFR BLD CALC: 33 % (ref 40.5–52.5)
HCT VFR BLD CALC: 33.6 % (ref 40.5–52.5)
HCT VFR BLD CALC: 34.3 % (ref 40.5–52.5)
HCT VFR BLD CALC: 35.1 % (ref 40.5–52.5)
HCT VFR BLD CALC: 35.1 % (ref 40.5–52.5)
HCT VFR BLD CALC: 35.2 % (ref 40.5–52.5)
HCT VFR BLD CALC: 35.4 % (ref 40.5–52.5)
HCT VFR BLD CALC: 35.8 % (ref 40.5–52.5)
HCT VFR BLD CALC: 35.9 % (ref 40.5–52.5)
HCT VFR BLD CALC: 37.4 % (ref 40.5–52.5)
HCT VFR BLD CALC: 39 % (ref 40.5–52.5)
HCT VFR BLD CALC: 39.4 % (ref 40.5–52.5)
HCT VFR BLD CALC: 42 % (ref 40.5–52.5)
HDLC SERPL-MCNC: 31 MG/DL (ref 40–60)
HDLC SERPL-MCNC: 41 MG/DL (ref 40–60)
HEMOGLOBIN: 10.3 G/DL (ref 13.5–17.5)
HEMOGLOBIN: 10.4 G/DL (ref 13.5–17.5)
HEMOGLOBIN: 10.9 G/DL (ref 13.5–17.5)
HEMOGLOBIN: 11 G/DL (ref 13.5–17.5)
HEMOGLOBIN: 11.2 G/DL (ref 13.5–17.5)
HEMOGLOBIN: 11.4 G/DL (ref 13.5–17.5)
HEMOGLOBIN: 11.5 G/DL (ref 13.5–17.5)
HEMOGLOBIN: 11.6 G/DL (ref 13.5–17.5)
HEMOGLOBIN: 11.7 G/DL (ref 13.5–17.5)
HEMOGLOBIN: 11.9 G/DL (ref 13.5–17.5)
HEMOGLOBIN: 11.9 G/DL (ref 13.5–17.5)
HEMOGLOBIN: 12 G/DL (ref 13.5–17.5)
HEMOGLOBIN: 12.6 G/DL (ref 13.5–17.5)
HEMOGLOBIN: 12.6 G/DL (ref 13.5–17.5)
HEMOGLOBIN: 13 G/DL (ref 13.5–17.5)
HEMOGLOBIN: 13.9 G/DL (ref 13.5–17.5)
HEMOGLOBIN: 8.4 G/DL (ref 13.5–17.5)
HEMOGLOBIN: 8.9 G/DL (ref 13.5–17.5)
HEMOGLOBIN: 9 G/DL (ref 13.5–17.5)
HEMOGLOBIN: 9.2 G/DL (ref 13.5–17.5)
HEMOGLOBIN: 9.7 G/DL (ref 13.5–17.5)
HEMOGLOBIN: 9.8 G/DL (ref 13.5–17.5)
HEMOGLOBIN: 9.9 G/DL (ref 13.5–17.5)
HYALINE CASTS: 1 /LPF (ref 0–8)
INR BLD: 1.13 (ref 0.88–1.12)
INR BLD: 1.2 (ref 0.88–1.12)
IRON SATURATION: 30 % (ref 20–50)
IRON: 42 UG/DL (ref 59–158)
KETONES, URINE: ABNORMAL MG/DL
KETONES, URINE: NEGATIVE MG/DL
LACTIC ACID, SEPSIS: 2.8 MMOL/L (ref 0.4–1.9)
LACTIC ACID, SEPSIS: 3 MMOL/L (ref 0.4–1.9)
LACTIC ACID, SEPSIS: 4.1 MMOL/L (ref 0.4–1.9)
LDL CHOLESTEROL CALCULATED: 85 MG/DL
LDL CHOLESTEROL CALCULATED: 93 MG/DL
LEFT VENTRICULAR EJECTION FRACTION HIGH VALUE: 45 %
LEFT VENTRICULAR EJECTION FRACTION MODE: NORMAL
LEUKOCYTE ESTERASE, URINE: NEGATIVE
LEUKOCYTE ESTERASE, URINE: NEGATIVE
LIPASE: 14 U/L (ref 13–60)
LIPASE: 19 U/L (ref 13–60)
LV EF: 48 %
LVEF MODALITY: NORMAL
LYMPHOCYTES ABSOLUTE: 0.7 K/UL (ref 1–5.1)
LYMPHOCYTES ABSOLUTE: 0.7 K/UL (ref 1–5.1)
LYMPHOCYTES ABSOLUTE: 0.8 K/UL (ref 1–5.1)
LYMPHOCYTES ABSOLUTE: 0.9 K/UL (ref 1–5.1)
LYMPHOCYTES ABSOLUTE: 1 K/UL (ref 1–5.1)
LYMPHOCYTES ABSOLUTE: 1 K/UL (ref 1–5.1)
LYMPHOCYTES ABSOLUTE: 1.1 K/UL (ref 1–5.1)
LYMPHOCYTES ABSOLUTE: 1.1 K/UL (ref 1–5.1)
LYMPHOCYTES ABSOLUTE: 1.2 K/UL (ref 1–5.1)
LYMPHOCYTES ABSOLUTE: 1.3 K/UL (ref 1–5.1)
LYMPHOCYTES ABSOLUTE: 1.5 K/UL (ref 1–5.1)
LYMPHOCYTES RELATIVE PERCENT: 10.3 %
LYMPHOCYTES RELATIVE PERCENT: 11.2 %
LYMPHOCYTES RELATIVE PERCENT: 11.5 %
LYMPHOCYTES RELATIVE PERCENT: 14.7 %
LYMPHOCYTES RELATIVE PERCENT: 19.7 %
LYMPHOCYTES RELATIVE PERCENT: 3.1 %
LYMPHOCYTES RELATIVE PERCENT: 3.5 %
LYMPHOCYTES RELATIVE PERCENT: 4.5 %
LYMPHOCYTES RELATIVE PERCENT: 4.7 %
LYMPHOCYTES RELATIVE PERCENT: 5.5 %
LYMPHOCYTES RELATIVE PERCENT: 5.9 %
LYMPHOCYTES RELATIVE PERCENT: 8.2 %
LYMPHOCYTES RELATIVE PERCENT: 8.6 %
LYMPHOCYTES RELATIVE PERCENT: 9.2 %
MAGNESIUM: 1.9 MG/DL (ref 1.8–2.4)
MAGNESIUM: 1.9 MG/DL (ref 1.8–2.4)
MAGNESIUM: 2 MG/DL (ref 1.8–2.4)
MAGNESIUM: 2 MG/DL (ref 1.8–2.4)
MAGNESIUM: 2.1 MG/DL (ref 1.8–2.4)
MAGNESIUM: 2.2 MG/DL (ref 1.8–2.4)
MCH RBC QN AUTO: 27.4 PG (ref 26–34)
MCH RBC QN AUTO: 27.4 PG (ref 26–34)
MCH RBC QN AUTO: 27.5 PG (ref 26–34)
MCH RBC QN AUTO: 27.7 PG (ref 26–34)
MCH RBC QN AUTO: 27.7 PG (ref 26–34)
MCH RBC QN AUTO: 27.8 PG (ref 26–34)
MCH RBC QN AUTO: 27.9 PG (ref 26–34)
MCH RBC QN AUTO: 28 PG (ref 26–34)
MCH RBC QN AUTO: 28.1 PG (ref 26–34)
MCH RBC QN AUTO: 28.5 PG (ref 26–34)
MCH RBC QN AUTO: 28.6 PG (ref 26–34)
MCH RBC QN AUTO: 28.7 PG (ref 26–34)
MCHC RBC AUTO-ENTMCNC: 32.9 G/DL (ref 31–36)
MCHC RBC AUTO-ENTMCNC: 33 G/DL (ref 31–36)
MCHC RBC AUTO-ENTMCNC: 33.1 G/DL (ref 31–36)
MCHC RBC AUTO-ENTMCNC: 33.2 G/DL (ref 31–36)
MCHC RBC AUTO-ENTMCNC: 33.3 G/DL (ref 31–36)
MCHC RBC AUTO-ENTMCNC: 33.3 G/DL (ref 31–36)
MCHC RBC AUTO-ENTMCNC: 33.4 G/DL (ref 31–36)
MCHC RBC AUTO-ENTMCNC: 33.4 G/DL (ref 31–36)
MCHC RBC AUTO-ENTMCNC: 33.6 G/DL (ref 31–36)
MCHC RBC AUTO-ENTMCNC: 33.6 G/DL (ref 31–36)
MCHC RBC AUTO-ENTMCNC: 33.8 G/DL (ref 31–36)
MCHC RBC AUTO-ENTMCNC: 34.1 G/DL (ref 31–36)
MCHC RBC AUTO-ENTMCNC: 34.7 G/DL (ref 31–36)
MCHC RBC AUTO-ENTMCNC: 34.8 G/DL (ref 31–36)
MCV RBC AUTO: 81.5 FL (ref 80–100)
MCV RBC AUTO: 82.2 FL (ref 80–100)
MCV RBC AUTO: 82.4 FL (ref 80–100)
MCV RBC AUTO: 82.4 FL (ref 80–100)
MCV RBC AUTO: 82.6 FL (ref 80–100)
MCV RBC AUTO: 82.7 FL (ref 80–100)
MCV RBC AUTO: 83.2 FL (ref 80–100)
MCV RBC AUTO: 83.3 FL (ref 80–100)
MCV RBC AUTO: 83.5 FL (ref 80–100)
MCV RBC AUTO: 83.6 FL (ref 80–100)
MCV RBC AUTO: 83.7 FL (ref 80–100)
MCV RBC AUTO: 83.8 FL (ref 80–100)
MCV RBC AUTO: 84.1 FL (ref 80–100)
MCV RBC AUTO: 84.4 FL (ref 80–100)
MCV RBC AUTO: 84.8 FL (ref 80–100)
MCV RBC AUTO: 86.7 FL (ref 80–100)
METHEMOGLOBIN VENOUS: 0 %
MICROSCOPIC EXAMINATION: NORMAL
MICROSCOPIC EXAMINATION: YES
MONOCYTES ABSOLUTE: 0.7 K/UL (ref 0–1.3)
MONOCYTES ABSOLUTE: 0.7 K/UL (ref 0–1.3)
MONOCYTES ABSOLUTE: 0.8 K/UL (ref 0–1.3)
MONOCYTES ABSOLUTE: 0.8 K/UL (ref 0–1.3)
MONOCYTES ABSOLUTE: 0.9 K/UL (ref 0–1.3)
MONOCYTES ABSOLUTE: 1 K/UL (ref 0–1.3)
MONOCYTES ABSOLUTE: 1.4 K/UL (ref 0–1.3)
MONOCYTES ABSOLUTE: 1.6 K/UL (ref 0–1.3)
MONOCYTES ABSOLUTE: 1.7 K/UL (ref 0–1.3)
MONOCYTES ABSOLUTE: 1.8 K/UL (ref 0–1.3)
MONOCYTES RELATIVE PERCENT: 10.2 %
MONOCYTES RELATIVE PERCENT: 4.8 %
MONOCYTES RELATIVE PERCENT: 7 %
MONOCYTES RELATIVE PERCENT: 7.1 %
MONOCYTES RELATIVE PERCENT: 7.8 %
MONOCYTES RELATIVE PERCENT: 7.9 %
MONOCYTES RELATIVE PERCENT: 8 %
MONOCYTES RELATIVE PERCENT: 8.1 %
MONOCYTES RELATIVE PERCENT: 8.7 %
MONOCYTES RELATIVE PERCENT: 8.8 %
MONOCYTES RELATIVE PERCENT: 9 %
MONOCYTES RELATIVE PERCENT: 9.1 %
MONOCYTES RELATIVE PERCENT: 9.3 %
MONOCYTES RELATIVE PERCENT: 9.7 %
MRSA SCREEN RT-PCR: NORMAL
NEUTROPHILS ABSOLUTE: 10.8 K/UL (ref 1.7–7.7)
NEUTROPHILS ABSOLUTE: 12.8 K/UL (ref 1.7–7.7)
NEUTROPHILS ABSOLUTE: 16.5 K/UL (ref 1.7–7.7)
NEUTROPHILS ABSOLUTE: 17.8 K/UL (ref 1.7–7.7)
NEUTROPHILS ABSOLUTE: 19.2 K/UL (ref 1.7–7.7)
NEUTROPHILS ABSOLUTE: 20 K/UL (ref 1.7–7.7)
NEUTROPHILS ABSOLUTE: 5.3 K/UL (ref 1.7–7.7)
NEUTROPHILS ABSOLUTE: 6.5 K/UL (ref 1.7–7.7)
NEUTROPHILS ABSOLUTE: 7.3 K/UL (ref 1.7–7.7)
NEUTROPHILS ABSOLUTE: 7.4 K/UL (ref 1.7–7.7)
NEUTROPHILS ABSOLUTE: 7.4 K/UL (ref 1.7–7.7)
NEUTROPHILS ABSOLUTE: 8 K/UL (ref 1.7–7.7)
NEUTROPHILS ABSOLUTE: 8.7 K/UL (ref 1.7–7.7)
NEUTROPHILS ABSOLUTE: 8.7 K/UL (ref 1.7–7.7)
NEUTROPHILS RELATIVE PERCENT: 68.8 %
NEUTROPHILS RELATIVE PERCENT: 74.6 %
NEUTROPHILS RELATIVE PERCENT: 75.4 %
NEUTROPHILS RELATIVE PERCENT: 76.4 %
NEUTROPHILS RELATIVE PERCENT: 76.9 %
NEUTROPHILS RELATIVE PERCENT: 77.5 %
NEUTROPHILS RELATIVE PERCENT: 78.7 %
NEUTROPHILS RELATIVE PERCENT: 80.3 %
NEUTROPHILS RELATIVE PERCENT: 83.9 %
NEUTROPHILS RELATIVE PERCENT: 85.1 %
NEUTROPHILS RELATIVE PERCENT: 85.6 %
NEUTROPHILS RELATIVE PERCENT: 87.2 %
NEUTROPHILS RELATIVE PERCENT: 89.4 %
NEUTROPHILS RELATIVE PERCENT: 89.9 %
NITRITE, URINE: NEGATIVE
NITRITE, URINE: NEGATIVE
O2 CONTENT, VEN: 19 VOL %
O2 SAT, VEN: 100 %
O2 THERAPY: ABNORMAL
PCO2, VEN: 30.7 MMHG (ref 40–50)
PDW BLD-RTO: 13 % (ref 12.4–15.4)
PDW BLD-RTO: 13.4 % (ref 12.4–15.4)
PDW BLD-RTO: 13.5 % (ref 12.4–15.4)
PDW BLD-RTO: 13.6 % (ref 12.4–15.4)
PDW BLD-RTO: 13.6 % (ref 12.4–15.4)
PDW BLD-RTO: 13.7 % (ref 12.4–15.4)
PDW BLD-RTO: 14.1 % (ref 12.4–15.4)
PDW BLD-RTO: 14.2 % (ref 12.4–15.4)
PDW BLD-RTO: 14.5 % (ref 12.4–15.4)
PDW BLD-RTO: 14.7 % (ref 12.4–15.4)
PH UA: 6.5 (ref 5–8)
PH UA: 7 (ref 5–8)
PH VENOUS: 7.42 (ref 7.35–7.45)
PHOSPHORUS: 2.1 MG/DL (ref 2.5–4.9)
PHOSPHORUS: 2.1 MG/DL (ref 2.5–4.9)
PHOSPHORUS: 2.3 MG/DL (ref 2.5–4.9)
PHOSPHORUS: 2.5 MG/DL (ref 2.5–4.9)
PHOSPHORUS: 2.7 MG/DL (ref 2.5–4.9)
PHOSPHORUS: 3.1 MG/DL (ref 2.5–4.9)
PHOSPHORUS: 3.1 MG/DL (ref 2.5–4.9)
PHOSPHORUS: 3.3 MG/DL (ref 2.5–4.9)
PHOSPHORUS: 3.3 MG/DL (ref 2.5–4.9)
PHOSPHORUS: 3.4 MG/DL (ref 2.5–4.9)
PLATELET # BLD: 260 K/UL (ref 135–450)
PLATELET # BLD: 275 K/UL (ref 135–450)
PLATELET # BLD: 276 K/UL (ref 135–450)
PLATELET # BLD: 293 K/UL (ref 135–450)
PLATELET # BLD: 293 K/UL (ref 135–450)
PLATELET # BLD: 299 K/UL (ref 135–450)
PLATELET # BLD: 301 K/UL (ref 135–450)
PLATELET # BLD: 303 K/UL (ref 135–450)
PLATELET # BLD: 308 K/UL (ref 135–450)
PLATELET # BLD: 327 K/UL (ref 135–450)
PLATELET # BLD: 332 K/UL (ref 135–450)
PLATELET # BLD: 332 K/UL (ref 135–450)
PLATELET # BLD: 346 K/UL (ref 135–450)
PLATELET # BLD: 351 K/UL (ref 135–450)
PLATELET # BLD: 355 K/UL (ref 135–450)
PLATELET # BLD: 374 K/UL (ref 135–450)
PMV BLD AUTO: 8.1 FL (ref 5–10.5)
PMV BLD AUTO: 8.2 FL (ref 5–10.5)
PMV BLD AUTO: 8.2 FL (ref 5–10.5)
PMV BLD AUTO: 8.5 FL (ref 5–10.5)
PMV BLD AUTO: 8.5 FL (ref 5–10.5)
PMV BLD AUTO: 8.6 FL (ref 5–10.5)
PMV BLD AUTO: 8.8 FL (ref 5–10.5)
PMV BLD AUTO: 9.1 FL (ref 5–10.5)
PMV BLD AUTO: 9.2 FL (ref 5–10.5)
PMV BLD AUTO: 9.2 FL (ref 5–10.5)
PMV BLD AUTO: 9.3 FL (ref 5–10.5)
PMV BLD AUTO: 9.3 FL (ref 5–10.5)
PMV BLD AUTO: 9.6 FL (ref 5–10.5)
PMV BLD AUTO: 9.9 FL (ref 5–10.5)
PO2, VEN: 158 MMHG (ref 25–40)
POC ACT LR: 274 SEC
POTASSIUM REFLEX MAGNESIUM: 4.1 MMOL/L (ref 3.5–5.1)
POTASSIUM REFLEX MAGNESIUM: 4.2 MMOL/L (ref 3.5–5.1)
POTASSIUM REFLEX MAGNESIUM: 5 MMOL/L (ref 3.5–5.1)
POTASSIUM SERPL-SCNC: 3.2 MMOL/L (ref 3.5–5.1)
POTASSIUM SERPL-SCNC: 3.4 MMOL/L (ref 3.5–5.1)
POTASSIUM SERPL-SCNC: 3.5 MMOL/L (ref 3.5–5.1)
POTASSIUM SERPL-SCNC: 3.6 MMOL/L (ref 3.5–5.1)
POTASSIUM SERPL-SCNC: 3.7 MMOL/L (ref 3.5–5.1)
POTASSIUM SERPL-SCNC: 4.1 MMOL/L (ref 3.5–5.1)
POTASSIUM SERPL-SCNC: 4.1 MMOL/L (ref 3.5–5.1)
POTASSIUM SERPL-SCNC: 4.2 MMOL/L (ref 3.5–5.1)
POTASSIUM SERPL-SCNC: 4.3 MMOL/L (ref 3.5–5.1)
POTASSIUM SERPL-SCNC: 4.3 MMOL/L (ref 3.5–5.1)
POTASSIUM SERPL-SCNC: 5 MMOL/L (ref 3.5–5.1)
POTASSIUM SERPL-SCNC: 5.1 MMOL/L (ref 3.5–5.1)
PRO-BNP: 209 PG/ML (ref 0–449)
PRO-BNP: 84 PG/ML (ref 0–449)
PROSTATE SPECIFIC ANTIGEN: 1 NG/ML (ref 0–4)
PROTEIN UA: 100 MG/DL
PROTEIN UA: NEGATIVE MG/DL
PROTHROMBIN TIME: 12.8 SEC (ref 9.9–12.7)
PROTHROMBIN TIME: 13.7 SEC (ref 9.9–12.7)
RBC # BLD: 2.99 M/UL (ref 4.2–5.9)
RBC # BLD: 3.15 M/UL (ref 4.2–5.9)
RBC # BLD: 3.19 M/UL (ref 4.2–5.9)
RBC # BLD: 3.22 M/UL (ref 4.2–5.9)
RBC # BLD: 3.48 M/UL (ref 4.2–5.9)
RBC # BLD: 3.55 M/UL (ref 4.2–5.9)
RBC # BLD: 3.75 M/UL (ref 4.2–5.9)
RBC # BLD: 3.91 M/UL (ref 4.2–5.9)
RBC # BLD: 4.02 M/UL (ref 4.2–5.9)
RBC # BLD: 4.17 M/UL (ref 4.2–5.9)
RBC # BLD: 4.27 M/UL (ref 4.2–5.9)
RBC # BLD: 4.31 M/UL (ref 4.2–5.9)
RBC # BLD: 4.35 M/UL (ref 4.2–5.9)
RBC # BLD: 4.52 M/UL (ref 4.2–5.9)
RBC # BLD: 4.55 M/UL (ref 4.2–5.9)
RBC # BLD: 4.96 M/UL (ref 4.2–5.9)
RBC UA: 1 /HPF (ref 0–4)
SARS-COV-2: NOT DETECTED
SODIUM BLD-SCNC: 130 MMOL/L (ref 136–145)
SODIUM BLD-SCNC: 131 MMOL/L (ref 136–145)
SODIUM BLD-SCNC: 132 MMOL/L (ref 136–145)
SODIUM BLD-SCNC: 134 MMOL/L (ref 136–145)
SODIUM BLD-SCNC: 135 MMOL/L (ref 136–145)
SODIUM BLD-SCNC: 135 MMOL/L (ref 136–145)
SODIUM BLD-SCNC: 136 MMOL/L (ref 136–145)
SODIUM BLD-SCNC: 137 MMOL/L (ref 136–145)
SODIUM BLD-SCNC: 137 MMOL/L (ref 136–145)
SODIUM BLD-SCNC: 138 MMOL/L (ref 136–145)
SODIUM BLD-SCNC: 138 MMOL/L (ref 136–145)
SODIUM BLD-SCNC: 139 MMOL/L (ref 136–145)
SPECIFIC GRAVITY UA: 1.01 (ref 1–1.03)
SPECIFIC GRAVITY UA: >1.03 (ref 1–1.03)
TCO2 CALC VENOUS: 47 MMOL/L
TOTAL IRON BINDING CAPACITY: 140 UG/DL (ref 260–445)
TOTAL PROTEIN: 6.6 G/DL (ref 6.4–8.2)
TOTAL PROTEIN: 6.7 G/DL (ref 6.4–8.2)
TOTAL PROTEIN: 8 G/DL (ref 6.4–8.2)
TRANSFERRIN: 117 MG/DL (ref 200–360)
TRIGL SERPL-MCNC: 142 MG/DL (ref 0–150)
TRIGL SERPL-MCNC: 78 MG/DL (ref 0–150)
TROPONIN: 0.03 NG/ML
TROPONIN: 0.04 NG/ML
TROPONIN: 0.18 NG/ML
TROPONIN: 0.22 NG/ML
TROPONIN: <0.01 NG/ML
TROPONIN: <0.01 NG/ML
TSH REFLEX: 2.47 UIU/ML (ref 0.27–4.2)
URINE CULTURE, ROUTINE: NORMAL
URINE REFLEX TO CULTURE: NORMAL
URINE TYPE: ABNORMAL
URINE TYPE: NORMAL
UROBILINOGEN, URINE: 0.2 E.U./DL
UROBILINOGEN, URINE: 0.2 E.U./DL
VANCOMYCIN RANDOM: 6.3 UG/ML
VITAMIN B-12: 889 PG/ML (ref 211–911)
VLDLC SERPL CALC-MCNC: 16 MG/DL
VLDLC SERPL CALC-MCNC: 28 MG/DL
WBC # BLD: 10.1 K/UL (ref 4–11)
WBC # BLD: 10.6 K/UL (ref 4–11)
WBC # BLD: 10.8 K/UL (ref 4–11)
WBC # BLD: 11 K/UL (ref 4–11)
WBC # BLD: 12.8 K/UL (ref 4–11)
WBC # BLD: 15.2 K/UL (ref 4–11)
WBC # BLD: 18.3 K/UL (ref 4–11)
WBC # BLD: 19.3 K/UL (ref 4–11)
WBC # BLD: 20.4 K/UL (ref 4–11)
WBC # BLD: 21.4 K/UL (ref 4–11)
WBC # BLD: 22.3 K/UL (ref 4–11)
WBC # BLD: 7.7 K/UL (ref 4–11)
WBC # BLD: 8.8 K/UL (ref 4–11)
WBC # BLD: 9.6 K/UL (ref 4–11)
WBC UA: 1 /HPF (ref 0–5)

## 2021-01-01 PROCEDURE — 2580000003 HC RX 258: Performed by: REGISTERED NURSE

## 2021-01-01 PROCEDURE — 1200000000 HC SEMI PRIVATE

## 2021-01-01 PROCEDURE — 97116 GAIT TRAINING THERAPY: CPT

## 2021-01-01 PROCEDURE — 97530 THERAPEUTIC ACTIVITIES: CPT

## 2021-01-01 PROCEDURE — 99214 OFFICE O/P EST MOD 30 MIN: CPT | Performed by: STUDENT IN AN ORGANIZED HEALTH CARE EDUCATION/TRAINING PROGRAM

## 2021-01-01 PROCEDURE — 6370000000 HC RX 637 (ALT 250 FOR IP): Performed by: INTERNAL MEDICINE

## 2021-01-01 PROCEDURE — 2580000003 HC RX 258: Performed by: INTERNAL MEDICINE

## 2021-01-01 PROCEDURE — 6370000000 HC RX 637 (ALT 250 FOR IP): Performed by: UROLOGY

## 2021-01-01 PROCEDURE — 80048 BASIC METABOLIC PNL TOTAL CA: CPT

## 2021-01-01 PROCEDURE — 2580000003 HC RX 258: Performed by: PHYSICIAN ASSISTANT

## 2021-01-01 PROCEDURE — G0378 HOSPITAL OBSERVATION PER HR: HCPCS

## 2021-01-01 PROCEDURE — 6360000002 HC RX W HCPCS: Performed by: INTERNAL MEDICINE

## 2021-01-01 PROCEDURE — 70490 CT SOFT TISSUE NECK W/O DYE: CPT

## 2021-01-01 PROCEDURE — G8417 CALC BMI ABV UP PARAM F/U: HCPCS | Performed by: OTOLARYNGOLOGY

## 2021-01-01 PROCEDURE — 93005 ELECTROCARDIOGRAM TRACING: CPT | Performed by: INTERNAL MEDICINE

## 2021-01-01 PROCEDURE — 85025 COMPLETE CBC W/AUTO DIFF WBC: CPT

## 2021-01-01 PROCEDURE — 82803 BLOOD GASES ANY COMBINATION: CPT

## 2021-01-01 PROCEDURE — 36415 COLL VENOUS BLD VENIPUNCTURE: CPT

## 2021-01-01 PROCEDURE — 83690 ASSAY OF LIPASE: CPT

## 2021-01-01 PROCEDURE — 94760 N-INVAS EAR/PLS OXIMETRY 1: CPT

## 2021-01-01 PROCEDURE — 99153 MOD SED SAME PHYS/QHP EA: CPT

## 2021-01-01 PROCEDURE — 1036F TOBACCO NON-USER: CPT | Performed by: FAMILY MEDICINE

## 2021-01-01 PROCEDURE — 6360000002 HC RX W HCPCS

## 2021-01-01 PROCEDURE — 93005 ELECTROCARDIOGRAM TRACING: CPT | Performed by: EMERGENCY MEDICINE

## 2021-01-01 PROCEDURE — 94761 N-INVAS EAR/PLS OXIMETRY MLT: CPT

## 2021-01-01 PROCEDURE — 96374 THER/PROPH/DIAG INJ IV PUSH: CPT

## 2021-01-01 PROCEDURE — 6360000002 HC RX W HCPCS: Performed by: HOSPITALIST

## 2021-01-01 PROCEDURE — 6370000000 HC RX 637 (ALT 250 FOR IP): Performed by: PHYSICIAN ASSISTANT

## 2021-01-01 PROCEDURE — 2500000003 HC RX 250 WO HCPCS: Performed by: INTERNAL MEDICINE

## 2021-01-01 PROCEDURE — 1111F DSCHRG MED/CURRENT MED MERGE: CPT | Performed by: FAMILY MEDICINE

## 2021-01-01 PROCEDURE — 6360000002 HC RX W HCPCS: Performed by: EMERGENCY MEDICINE

## 2021-01-01 PROCEDURE — 87086 URINE CULTURE/COLONY COUNT: CPT

## 2021-01-01 PROCEDURE — 3600000014 HC SURGERY LEVEL 4 ADDTL 15MIN: Performed by: OTOLARYNGOLOGY

## 2021-01-01 PROCEDURE — 6360000002 HC RX W HCPCS: Performed by: REGISTERED NURSE

## 2021-01-01 PROCEDURE — C1769 GUIDE WIRE: HCPCS

## 2021-01-01 PROCEDURE — C9113 INJ PANTOPRAZOLE SODIUM, VIA: HCPCS | Performed by: INTERNAL MEDICINE

## 2021-01-01 PROCEDURE — G8484 FLU IMMUNIZE NO ADMIN: HCPCS | Performed by: STUDENT IN AN ORGANIZED HEALTH CARE EDUCATION/TRAINING PROGRAM

## 2021-01-01 PROCEDURE — 83735 ASSAY OF MAGNESIUM: CPT

## 2021-01-01 PROCEDURE — 51798 US URINE CAPACITY MEASURE: CPT

## 2021-01-01 PROCEDURE — 85018 HEMOGLOBIN: CPT

## 2021-01-01 PROCEDURE — 80061 LIPID PANEL: CPT

## 2021-01-01 PROCEDURE — 7100000011 HC PHASE II RECOVERY - ADDTL 15 MIN: Performed by: OTOLARYNGOLOGY

## 2021-01-01 PROCEDURE — 96361 HYDRATE IV INFUSION ADD-ON: CPT

## 2021-01-01 PROCEDURE — 82728 ASSAY OF FERRITIN: CPT

## 2021-01-01 PROCEDURE — 2500000003 HC RX 250 WO HCPCS: Performed by: REGISTERED NURSE

## 2021-01-01 PROCEDURE — 87641 MR-STAPH DNA AMP PROBE: CPT

## 2021-01-01 PROCEDURE — 74019 RADEX ABDOMEN 2 VIEWS: CPT

## 2021-01-01 PROCEDURE — G8417 CALC BMI ABV UP PARAM F/U: HCPCS | Performed by: STUDENT IN AN ORGANIZED HEALTH CARE EDUCATION/TRAINING PROGRAM

## 2021-01-01 PROCEDURE — 96376 TX/PRO/DX INJ SAME DRUG ADON: CPT

## 2021-01-01 PROCEDURE — 6360000004 HC RX CONTRAST MEDICATION

## 2021-01-01 PROCEDURE — 84484 ASSAY OF TROPONIN QUANT: CPT

## 2021-01-01 PROCEDURE — 6360000004 HC RX CONTRAST MEDICATION: Performed by: INTERNAL MEDICINE

## 2021-01-01 PROCEDURE — 88307 TISSUE EXAM BY PATHOLOGIST: CPT

## 2021-01-01 PROCEDURE — 84100 ASSAY OF PHOSPHORUS: CPT

## 2021-01-01 PROCEDURE — 20983 ABLATE BONE TUMOR(S) PERQ: CPT

## 2021-01-01 PROCEDURE — 6370000000 HC RX 637 (ALT 250 FOR IP): Performed by: FAMILY MEDICINE

## 2021-01-01 PROCEDURE — 6360000002 HC RX W HCPCS: Performed by: OTOLARYNGOLOGY

## 2021-01-01 PROCEDURE — 99213 OFFICE O/P EST LOW 20 MIN: CPT | Performed by: OTOLARYNGOLOGY

## 2021-01-01 PROCEDURE — 1036F TOBACCO NON-USER: CPT | Performed by: OTOLARYNGOLOGY

## 2021-01-01 PROCEDURE — 96375 TX/PRO/DX INJ NEW DRUG ADDON: CPT

## 2021-01-01 PROCEDURE — G8427 DOCREV CUR MEDS BY ELIG CLIN: HCPCS | Performed by: STUDENT IN AN ORGANIZED HEALTH CARE EDUCATION/TRAINING PROGRAM

## 2021-01-01 PROCEDURE — 85027 COMPLETE CBC AUTOMATED: CPT

## 2021-01-01 PROCEDURE — 85014 HEMATOCRIT: CPT

## 2021-01-01 PROCEDURE — 2580000003 HC RX 258: Performed by: OTOLARYNGOLOGY

## 2021-01-01 PROCEDURE — 92928 PRQ TCAT PLMT NTRAC ST 1 LES: CPT | Performed by: INTERNAL MEDICINE

## 2021-01-01 PROCEDURE — 99232 SBSQ HOSP IP/OBS MODERATE 35: CPT | Performed by: STUDENT IN AN ORGANIZED HEALTH CARE EDUCATION/TRAINING PROGRAM

## 2021-01-01 PROCEDURE — 93010 ELECTROCARDIOGRAM REPORT: CPT | Performed by: INTERNAL MEDICINE

## 2021-01-01 PROCEDURE — G8427 DOCREV CUR MEDS BY ELIG CLIN: HCPCS | Performed by: OTOLARYNGOLOGY

## 2021-01-01 PROCEDURE — 93306 TTE W/DOPPLER COMPLETE: CPT

## 2021-01-01 PROCEDURE — 99232 SBSQ HOSP IP/OBS MODERATE 35: CPT | Performed by: INTERNAL MEDICINE

## 2021-01-01 PROCEDURE — 96365 THER/PROPH/DIAG IV INF INIT: CPT

## 2021-01-01 PROCEDURE — 31231 NASAL ENDOSCOPY DX: CPT | Performed by: STUDENT IN AN ORGANIZED HEALTH CARE EDUCATION/TRAINING PROGRAM

## 2021-01-01 PROCEDURE — 2709999900 HC NON-CHARGEABLE SUPPLY: Performed by: OTOLARYNGOLOGY

## 2021-01-01 PROCEDURE — G8484 FLU IMMUNIZE NO ADMIN: HCPCS | Performed by: FAMILY MEDICINE

## 2021-01-01 PROCEDURE — 99233 SBSQ HOSP IP/OBS HIGH 50: CPT | Performed by: INTERNAL MEDICINE

## 2021-01-01 PROCEDURE — A9577 INJ MULTIHANCE: HCPCS | Performed by: NURSE PRACTITIONER

## 2021-01-01 PROCEDURE — C1713 ANCHOR/SCREW BN/BN,TIS/BN: HCPCS

## 2021-01-01 PROCEDURE — 72157 MRI CHEST SPINE W/O & W/DYE: CPT

## 2021-01-01 PROCEDURE — 6370000000 HC RX 637 (ALT 250 FOR IP): Performed by: NURSE PRACTITIONER

## 2021-01-01 PROCEDURE — G8417 CALC BMI ABV UP PARAM F/U: HCPCS | Performed by: FAMILY MEDICINE

## 2021-01-01 PROCEDURE — 99152 MOD SED SAME PHYS/QHP 5/>YRS: CPT | Performed by: INTERNAL MEDICINE

## 2021-01-01 PROCEDURE — 3609012400 HC EGD TRANSORAL BIOPSY SINGLE/MULTIPLE: Performed by: INTERNAL MEDICINE

## 2021-01-01 PROCEDURE — 4040F PNEUMOC VAC/ADMIN/RCVD: CPT | Performed by: OTOLARYNGOLOGY

## 2021-01-01 PROCEDURE — 71275 CT ANGIOGRAPHY CHEST: CPT

## 2021-01-01 PROCEDURE — 3600000004 HC SURGERY LEVEL 4 BASE: Performed by: OTOLARYNGOLOGY

## 2021-01-01 PROCEDURE — 74177 CT ABD & PELVIS W/CONTRAST: CPT

## 2021-01-01 PROCEDURE — 22513 PERQ VERTEBRAL AUGMENTATION: CPT

## 2021-01-01 PROCEDURE — 4040F PNEUMOC VAC/ADMIN/RCVD: CPT | Performed by: FAMILY MEDICINE

## 2021-01-01 PROCEDURE — 4040F PNEUMOC VAC/ADMIN/RCVD: CPT | Performed by: INTERNAL MEDICINE

## 2021-01-01 PROCEDURE — 1036F TOBACCO NON-USER: CPT | Performed by: STUDENT IN AN ORGANIZED HEALTH CARE EDUCATION/TRAINING PROGRAM

## 2021-01-01 PROCEDURE — 85730 THROMBOPLASTIN TIME PARTIAL: CPT

## 2021-01-01 PROCEDURE — 97165 OT EVAL LOW COMPLEX 30 MIN: CPT

## 2021-01-01 PROCEDURE — 30901 CONTROL OF NOSEBLEED: CPT | Performed by: OTOLARYNGOLOGY

## 2021-01-01 PROCEDURE — 99024 POSTOP FOLLOW-UP VISIT: CPT | Performed by: OTOLARYNGOLOGY

## 2021-01-01 PROCEDURE — 97161 PT EVAL LOW COMPLEX 20 MIN: CPT

## 2021-01-01 PROCEDURE — 3700000000 HC ANESTHESIA ATTENDED CARE: Performed by: INTERNAL MEDICINE

## 2021-01-01 PROCEDURE — 1123F ACP DISCUSS/DSCN MKR DOCD: CPT | Performed by: STUDENT IN AN ORGANIZED HEALTH CARE EDUCATION/TRAINING PROGRAM

## 2021-01-01 PROCEDURE — 99231 SBSQ HOSP IP/OBS SF/LOW 25: CPT | Performed by: STUDENT IN AN ORGANIZED HEALTH CARE EDUCATION/TRAINING PROGRAM

## 2021-01-01 PROCEDURE — 88342 IMHCHEM/IMCYTCHM 1ST ANTB: CPT

## 2021-01-01 PROCEDURE — 2500000003 HC RX 250 WO HCPCS

## 2021-01-01 PROCEDURE — 70450 CT HEAD/BRAIN W/O DYE: CPT

## 2021-01-01 PROCEDURE — 85610 PROTHROMBIN TIME: CPT

## 2021-01-01 PROCEDURE — 80053 COMPREHEN METABOLIC PANEL: CPT

## 2021-01-01 PROCEDURE — 7100000000 HC PACU RECOVERY - FIRST 15 MIN: Performed by: INTERNAL MEDICINE

## 2021-01-01 PROCEDURE — 97110 THERAPEUTIC EXERCISES: CPT

## 2021-01-01 PROCEDURE — 2580000003 HC RX 258: Performed by: EMERGENCY MEDICINE

## 2021-01-01 PROCEDURE — 99214 OFFICE O/P EST MOD 30 MIN: CPT | Performed by: FAMILY MEDICINE

## 2021-01-01 PROCEDURE — 82150 ASSAY OF AMYLASE: CPT

## 2021-01-01 PROCEDURE — 0PB40ZX EXCISION OF THORACIC VERTEBRA, OPEN APPROACH, DIAGNOSTIC: ICD-10-PCS | Performed by: RADIOLOGY

## 2021-01-01 PROCEDURE — 88305 TISSUE EXAM BY PATHOLOGIST: CPT

## 2021-01-01 PROCEDURE — G8427 DOCREV CUR MEDS BY ELIG CLIN: HCPCS | Performed by: INTERNAL MEDICINE

## 2021-01-01 PROCEDURE — 88311 DECALCIFY TISSUE: CPT

## 2021-01-01 PROCEDURE — 7100000000 HC PACU RECOVERY - FIRST 15 MIN: Performed by: OTOLARYNGOLOGY

## 2021-01-01 PROCEDURE — C9600 PERC DRUG-EL COR STENT SING: HCPCS

## 2021-01-01 PROCEDURE — 6370000000 HC RX 637 (ALT 250 FOR IP): Performed by: EMERGENCY MEDICINE

## 2021-01-01 PROCEDURE — 99211 OFF/OP EST MAY X REQ PHY/QHP: CPT | Performed by: NURSE PRACTITIONER

## 2021-01-01 PROCEDURE — 88312 SPECIAL STAINS GROUP 1: CPT

## 2021-01-01 PROCEDURE — 99215 OFFICE O/P EST HI 40 MIN: CPT | Performed by: FAMILY MEDICINE

## 2021-01-01 PROCEDURE — 3700000001 HC ADD 15 MINUTES (ANESTHESIA): Performed by: OTOLARYNGOLOGY

## 2021-01-01 PROCEDURE — 1123F ACP DISCUSS/DSCN MKR DOCD: CPT | Performed by: OTOLARYNGOLOGY

## 2021-01-01 PROCEDURE — 72131 CT LUMBAR SPINE W/O DYE: CPT

## 2021-01-01 PROCEDURE — 83605 ASSAY OF LACTIC ACID: CPT

## 2021-01-01 PROCEDURE — 99152 MOD SED SAME PHYS/QHP 5/>YRS: CPT

## 2021-01-01 PROCEDURE — 80202 ASSAY OF VANCOMYCIN: CPT

## 2021-01-01 PROCEDURE — 6360000004 HC RX CONTRAST MEDICATION: Performed by: EMERGENCY MEDICINE

## 2021-01-01 PROCEDURE — 93458 L HRT ARTERY/VENTRICLE ANGIO: CPT

## 2021-01-01 PROCEDURE — 7100000010 HC PHASE II RECOVERY - FIRST 15 MIN: Performed by: OTOLARYNGOLOGY

## 2021-01-01 PROCEDURE — 71045 X-RAY EXAM CHEST 1 VIEW: CPT

## 2021-01-01 PROCEDURE — 3209999900 CT THORACIC SPINE TRAUMA RECONSTRUCTION

## 2021-01-01 PROCEDURE — C1874 STENT, COATED/COV W/DEL SYS: HCPCS

## 2021-01-01 PROCEDURE — 97535 SELF CARE MNGMENT TRAINING: CPT

## 2021-01-01 PROCEDURE — 02HV33Z INSERTION OF INFUSION DEVICE INTO SUPERIOR VENA CAVA, PERCUTANEOUS APPROACH: ICD-10-PCS | Performed by: INTERNAL MEDICINE

## 2021-01-01 PROCEDURE — 74176 CT ABD & PELVIS W/O CONTRAST: CPT

## 2021-01-01 PROCEDURE — 93458 L HRT ARTERY/VENTRICLE ANGIO: CPT | Performed by: INTERNAL MEDICINE

## 2021-01-01 PROCEDURE — 83880 ASSAY OF NATRIURETIC PEPTIDE: CPT

## 2021-01-01 PROCEDURE — 2709999900 HC NON-CHARGEABLE SUPPLY: Performed by: INTERNAL MEDICINE

## 2021-01-01 PROCEDURE — 76705 ECHO EXAM OF ABDOMEN: CPT

## 2021-01-01 PROCEDURE — 81001 URINALYSIS AUTO W/SCOPE: CPT

## 2021-01-01 PROCEDURE — G8417 CALC BMI ABV UP PARAM F/U: HCPCS | Performed by: NURSE PRACTITIONER

## 2021-01-01 PROCEDURE — 99285 EMERGENCY DEPT VISIT HI MDM: CPT

## 2021-01-01 PROCEDURE — 2500000003 HC RX 250 WO HCPCS: Performed by: EMERGENCY MEDICINE

## 2021-01-01 PROCEDURE — 0PU43JZ SUPPLEMENT THORACIC VERTEBRA WITH SYNTHETIC SUBSTITUTE, PERCUTANEOUS APPROACH: ICD-10-PCS | Performed by: RADIOLOGY

## 2021-01-01 PROCEDURE — 2709999900 HC NON-CHARGEABLE SUPPLY

## 2021-01-01 PROCEDURE — G8427 DOCREV CUR MEDS BY ELIG CLIN: HCPCS | Performed by: FAMILY MEDICINE

## 2021-01-01 PROCEDURE — 1036F TOBACCO NON-USER: CPT | Performed by: INTERNAL MEDICINE

## 2021-01-01 PROCEDURE — 31255 NSL/SINS NDSC W/TOT ETHMDCT: CPT | Performed by: OTOLARYNGOLOGY

## 2021-01-01 PROCEDURE — 1123F ACP DISCUSS/DSCN MKR DOCD: CPT | Performed by: INTERNAL MEDICINE

## 2021-01-01 PROCEDURE — G8428 CUR MEDS NOT DOCUMENT: HCPCS | Performed by: NURSE PRACTITIONER

## 2021-01-01 PROCEDURE — 1111F DSCHRG MED/CURRENT MED MERGE: CPT | Performed by: STUDENT IN AN ORGANIZED HEALTH CARE EDUCATION/TRAINING PROGRAM

## 2021-01-01 PROCEDURE — 0DB68ZX EXCISION OF STOMACH, VIA NATURAL OR ARTIFICIAL OPENING ENDOSCOPIC, DIAGNOSTIC: ICD-10-PCS | Performed by: INTERNAL MEDICINE

## 2021-01-01 PROCEDURE — 81003 URINALYSIS AUTO W/O SCOPE: CPT

## 2021-01-01 PROCEDURE — 2700000000 HC OXYGEN THERAPY PER DAY

## 2021-01-01 PROCEDURE — 82607 VITAMIN B-12: CPT

## 2021-01-01 PROCEDURE — 4040F PNEUMOC VAC/ADMIN/RCVD: CPT | Performed by: STUDENT IN AN ORGANIZED HEALTH CARE EDUCATION/TRAINING PROGRAM

## 2021-01-01 PROCEDURE — 85347 COAGULATION TIME ACTIVATED: CPT

## 2021-01-01 PROCEDURE — 36569 INSJ PICC 5 YR+ W/O IMAGING: CPT

## 2021-01-01 PROCEDURE — 88341 IMHCHEM/IMCYTCHM EA ADD ANTB: CPT

## 2021-01-01 PROCEDURE — C1887 CATHETER, GUIDING: HCPCS

## 2021-01-01 PROCEDURE — 71260 CT THORAX DX C+: CPT

## 2021-01-01 PROCEDURE — C1894 INTRO/SHEATH, NON-LASER: HCPCS

## 2021-01-01 PROCEDURE — C1751 CATH, INF, PER/CENT/MIDLINE: HCPCS

## 2021-01-01 PROCEDURE — 84153 ASSAY OF PSA TOTAL: CPT

## 2021-01-01 PROCEDURE — G8484 FLU IMMUNIZE NO ADMIN: HCPCS | Performed by: INTERNAL MEDICINE

## 2021-01-01 PROCEDURE — 31267 ENDOSCOPY MAXILLARY SINUS: CPT | Performed by: OTOLARYNGOLOGY

## 2021-01-01 PROCEDURE — 99223 1ST HOSP IP/OBS HIGH 75: CPT | Performed by: INTERNAL MEDICINE

## 2021-01-01 PROCEDURE — 7100000001 HC PACU RECOVERY - ADDTL 15 MIN: Performed by: INTERNAL MEDICINE

## 2021-01-01 PROCEDURE — 2580000003 HC RX 258: Performed by: RADIOLOGY

## 2021-01-01 PROCEDURE — 1123F ACP DISCUSS/DSCN MKR DOCD: CPT | Performed by: FAMILY MEDICINE

## 2021-01-01 PROCEDURE — 6370000000 HC RX 637 (ALT 250 FOR IP): Performed by: OTOLARYNGOLOGY

## 2021-01-01 PROCEDURE — 6360000002 HC RX W HCPCS: Performed by: RADIOLOGY

## 2021-01-01 PROCEDURE — G0439 PPPS, SUBSEQ VISIT: HCPCS | Performed by: FAMILY MEDICINE

## 2021-01-01 PROCEDURE — 99284 EMERGENCY DEPT VISIT MOD MDM: CPT

## 2021-01-01 PROCEDURE — 2500000003 HC RX 250 WO HCPCS: Performed by: OTOLARYNGOLOGY

## 2021-01-01 PROCEDURE — 83540 ASSAY OF IRON: CPT

## 2021-01-01 PROCEDURE — 6360000002 HC RX W HCPCS: Performed by: PHYSICIAN ASSISTANT

## 2021-01-01 PROCEDURE — 3700000000 HC ANESTHESIA ATTENDED CARE: Performed by: OTOLARYNGOLOGY

## 2021-01-01 PROCEDURE — 7100000001 HC PACU RECOVERY - ADDTL 15 MIN: Performed by: OTOLARYNGOLOGY

## 2021-01-01 PROCEDURE — G0180 MD CERTIFICATION HHA PATIENT: HCPCS | Performed by: FAMILY MEDICINE

## 2021-01-01 PROCEDURE — 87040 BLOOD CULTURE FOR BACTERIA: CPT

## 2021-01-01 PROCEDURE — 99221 1ST HOSP IP/OBS SF/LOW 40: CPT | Performed by: STUDENT IN AN ORGANIZED HEALTH CARE EDUCATION/TRAINING PROGRAM

## 2021-01-01 PROCEDURE — 96366 THER/PROPH/DIAG IV INF ADDON: CPT

## 2021-01-01 PROCEDURE — G8420 CALC BMI NORM PARAMETERS: HCPCS | Performed by: INTERNAL MEDICINE

## 2021-01-01 PROCEDURE — 99214 OFFICE O/P EST MOD 30 MIN: CPT | Performed by: INTERNAL MEDICINE

## 2021-01-01 PROCEDURE — 99225 PR SBSQ OBSERVATION CARE/DAY 25 MINUTES: CPT | Performed by: INTERNAL MEDICINE

## 2021-01-01 PROCEDURE — 99215 OFFICE O/P EST HI 40 MIN: CPT | Performed by: NURSE PRACTITIONER

## 2021-01-01 PROCEDURE — 6360000004 HC RX CONTRAST MEDICATION: Performed by: NURSE PRACTITIONER

## 2021-01-01 PROCEDURE — 84466 ASSAY OF TRANSFERRIN: CPT

## 2021-01-01 PROCEDURE — 99205 OFFICE O/P NEW HI 60 MIN: CPT | Performed by: INTERNAL MEDICINE

## 2021-01-01 RX ORDER — SODIUM CHLORIDE 0.9 % (FLUSH) 0.9 %
5-40 SYRINGE (ML) INJECTION EVERY 12 HOURS SCHEDULED
Status: CANCELLED | OUTPATIENT
Start: 2021-01-01

## 2021-01-01 RX ORDER — MORPHINE SULFATE 4 MG/ML
4 INJECTION, SOLUTION INTRAMUSCULAR; INTRAVENOUS ONCE
Status: COMPLETED | OUTPATIENT
Start: 2021-01-01 | End: 2021-01-01

## 2021-01-01 RX ORDER — CARVEDILOL 6.25 MG/1
6.25 TABLET ORAL 2 TIMES DAILY WITH MEALS
Status: DISCONTINUED | OUTPATIENT
Start: 2021-01-01 | End: 2021-01-01 | Stop reason: HOSPADM

## 2021-01-01 RX ORDER — MIDAZOLAM HYDROCHLORIDE 1 MG/ML
INJECTION INTRAMUSCULAR; INTRAVENOUS
Status: COMPLETED | OUTPATIENT
Start: 2021-01-01 | End: 2021-01-01

## 2021-01-01 RX ORDER — SODIUM CHLORIDE 9 MG/ML
INJECTION, SOLUTION INTRAVENOUS CONTINUOUS
Status: DISCONTINUED | OUTPATIENT
Start: 2021-01-01 | End: 2021-01-01 | Stop reason: HOSPADM

## 2021-01-01 RX ORDER — SODIUM CHLORIDE 0.9 % (FLUSH) 0.9 %
5-40 SYRINGE (ML) INJECTION PRN
Status: CANCELLED | OUTPATIENT
Start: 2021-01-01

## 2021-01-01 RX ORDER — MORPHINE SULFATE 2 MG/ML
2 INJECTION, SOLUTION INTRAMUSCULAR; INTRAVENOUS
Status: DISCONTINUED | OUTPATIENT
Start: 2021-01-01 | End: 2021-01-01 | Stop reason: HOSPADM

## 2021-01-01 RX ORDER — FINASTERIDE 5 MG/1
5 TABLET, FILM COATED ORAL DAILY
Qty: 30 TABLET | Refills: 3 | Status: SHIPPED | OUTPATIENT
Start: 2021-01-01 | End: 2021-01-01

## 2021-01-01 RX ORDER — HYDROMORPHONE HCL 110MG/55ML
0.5 PATIENT CONTROLLED ANALGESIA SYRINGE INTRAVENOUS EVERY 5 MIN PRN
Status: DISCONTINUED | OUTPATIENT
Start: 2021-01-01 | End: 2021-01-01 | Stop reason: HOSPADM

## 2021-01-01 RX ORDER — OXYCODONE HYDROCHLORIDE 5 MG/1
10 TABLET ORAL EVERY 4 HOURS PRN
Status: DISCONTINUED | OUTPATIENT
Start: 2021-01-01 | End: 2021-01-01

## 2021-01-01 RX ORDER — ONDANSETRON 2 MG/ML
INJECTION INTRAMUSCULAR; INTRAVENOUS PRN
Status: DISCONTINUED | OUTPATIENT
Start: 2021-01-01 | End: 2021-01-01 | Stop reason: SDUPTHER

## 2021-01-01 RX ORDER — MORPHINE SULFATE 4 MG/ML
4 INJECTION, SOLUTION INTRAMUSCULAR; INTRAVENOUS
Status: DISCONTINUED | OUTPATIENT
Start: 2021-01-01 | End: 2021-01-01 | Stop reason: HOSPADM

## 2021-01-01 RX ORDER — CLOPIDOGREL BISULFATE 75 MG/1
75 TABLET ORAL DAILY
Qty: 30 TABLET | Refills: 3 | Status: SHIPPED | OUTPATIENT
Start: 2021-01-01

## 2021-01-01 RX ORDER — PROPOFOL 10 MG/ML
INJECTION, EMULSION INTRAVENOUS PRN
Status: DISCONTINUED | OUTPATIENT
Start: 2021-01-01 | End: 2021-01-01 | Stop reason: SDUPTHER

## 2021-01-01 RX ORDER — METOPROLOL SUCCINATE 25 MG/1
25 TABLET, EXTENDED RELEASE ORAL DAILY
Status: DISCONTINUED | OUTPATIENT
Start: 2021-01-01 | End: 2021-01-01

## 2021-01-01 RX ORDER — SODIUM CHLORIDE 9 MG/ML
25 INJECTION, SOLUTION INTRAVENOUS PRN
Status: DISCONTINUED | OUTPATIENT
Start: 2021-01-01 | End: 2021-01-01 | Stop reason: HOSPADM

## 2021-01-01 RX ORDER — HYDROMORPHONE HYDROCHLORIDE 1 MG/ML
1 INJECTION, SOLUTION INTRAMUSCULAR; INTRAVENOUS; SUBCUTANEOUS ONCE
Status: COMPLETED | OUTPATIENT
Start: 2021-01-01 | End: 2021-01-01

## 2021-01-01 RX ORDER — LIDOCAINE HYDROCHLORIDE 20 MG/ML
INJECTION, SOLUTION EPIDURAL; INFILTRATION; INTRACAUDAL; PERINEURAL PRN
Status: DISCONTINUED | OUTPATIENT
Start: 2021-01-01 | End: 2021-01-01 | Stop reason: SDUPTHER

## 2021-01-01 RX ORDER — GLYCOPYRROLATE 0.2 MG/ML
INJECTION INTRAMUSCULAR; INTRAVENOUS PRN
Status: DISCONTINUED | OUTPATIENT
Start: 2021-01-01 | End: 2021-01-01 | Stop reason: SDUPTHER

## 2021-01-01 RX ORDER — PROPOFOL 10 MG/ML
INJECTION, EMULSION INTRAVENOUS CONTINUOUS PRN
Status: DISCONTINUED | OUTPATIENT
Start: 2021-01-01 | End: 2021-01-01 | Stop reason: SDUPTHER

## 2021-01-01 RX ORDER — MORPHINE SULFATE 2 MG/ML
2 INJECTION, SOLUTION INTRAMUSCULAR; INTRAVENOUS EVERY 4 HOURS PRN
Status: DISCONTINUED | OUTPATIENT
Start: 2021-01-01 | End: 2021-01-01

## 2021-01-01 RX ORDER — LIDOCAINE HYDROCHLORIDE 10 MG/ML
5 INJECTION, SOLUTION EPIDURAL; INFILTRATION; INTRACAUDAL; PERINEURAL ONCE
Status: DISCONTINUED | OUTPATIENT
Start: 2021-01-01 | End: 2021-01-01 | Stop reason: HOSPADM

## 2021-01-01 RX ORDER — TAMSULOSIN HYDROCHLORIDE 0.4 MG/1
0.4 CAPSULE ORAL 2 TIMES DAILY
Qty: 30 CAPSULE | Refills: 3 | Status: SHIPPED | OUTPATIENT
Start: 2021-01-01 | End: 2021-01-01

## 2021-01-01 RX ORDER — ACETAMINOPHEN 325 MG/1
650 TABLET ORAL EVERY 6 HOURS PRN
Status: DISCONTINUED | OUTPATIENT
Start: 2021-01-01 | End: 2021-01-01 | Stop reason: HOSPADM

## 2021-01-01 RX ORDER — CLOPIDOGREL BISULFATE 75 MG/1
75 TABLET ORAL DAILY
Status: DISCONTINUED | OUTPATIENT
Start: 2021-01-01 | End: 2021-01-01 | Stop reason: HOSPADM

## 2021-01-01 RX ORDER — HYDRALAZINE HYDROCHLORIDE 20 MG/ML
10 INJECTION INTRAMUSCULAR; INTRAVENOUS EVERY 6 HOURS PRN
Status: DISCONTINUED | OUTPATIENT
Start: 2021-01-01 | End: 2021-01-01

## 2021-01-01 RX ORDER — MONTELUKAST SODIUM 10 MG/1
10 TABLET ORAL NIGHTLY
Qty: 30 TABLET | Refills: 1 | Status: SHIPPED | OUTPATIENT
Start: 2021-01-01 | End: 2021-01-01

## 2021-01-01 RX ORDER — GUAIFENESIN 600 MG/1
600 TABLET, EXTENDED RELEASE ORAL 2 TIMES DAILY
Qty: 30 TABLET | Refills: 1 | Status: SHIPPED | OUTPATIENT
Start: 2021-01-01 | End: 2021-01-01

## 2021-01-01 RX ORDER — NITROGLYCERIN 0.4 MG/1
0.4 TABLET SUBLINGUAL EVERY 5 MIN PRN
Status: DISCONTINUED | OUTPATIENT
Start: 2021-01-01 | End: 2021-01-01 | Stop reason: HOSPADM

## 2021-01-01 RX ORDER — PANTOPRAZOLE SODIUM 40 MG/1
TABLET, DELAYED RELEASE ORAL
Qty: 90 TABLET | OUTPATIENT
Start: 2021-01-01

## 2021-01-01 RX ORDER — CALCIUM CARBONATE 200(500)MG
1 TABLET,CHEWABLE ORAL 3 TIMES DAILY PRN
Qty: 60 TABLET | Refills: 1 | Status: SHIPPED | OUTPATIENT
Start: 2021-01-01 | End: 2021-12-07

## 2021-01-01 RX ORDER — OXYMETAZOLINE HYDROCHLORIDE 0.05 G/100ML
SPRAY NASAL
Status: COMPLETED | OUTPATIENT
Start: 2021-01-01 | End: 2021-01-01

## 2021-01-01 RX ORDER — HEPARIN SODIUM 1000 [USP'U]/ML
4000 INJECTION, SOLUTION INTRAVENOUS; SUBCUTANEOUS ONCE
Status: COMPLETED | OUTPATIENT
Start: 2021-01-01 | End: 2021-01-01

## 2021-01-01 RX ORDER — SODIUM CHLORIDE 9 MG/ML
25 INJECTION, SOLUTION INTRAVENOUS PRN
Status: CANCELLED | OUTPATIENT
Start: 2021-01-01

## 2021-01-01 RX ORDER — PROMETHAZINE HYDROCHLORIDE 25 MG/1
12.5 TABLET ORAL EVERY 6 HOURS PRN
Status: CANCELLED | OUTPATIENT
Start: 2021-01-01

## 2021-01-01 RX ORDER — OXYCODONE HYDROCHLORIDE 5 MG/1
5 TABLET ORAL EVERY 4 HOURS
Status: DISCONTINUED | OUTPATIENT
Start: 2021-01-01 | End: 2021-01-01

## 2021-01-01 RX ORDER — OXYCODONE AND ACETAMINOPHEN 10; 325 MG/1; MG/1
1 TABLET ORAL EVERY 8 HOURS PRN
Qty: 9 TABLET | Refills: 0 | Status: SHIPPED | OUTPATIENT
Start: 2021-01-01 | End: 2021-01-01 | Stop reason: SDUPTHER

## 2021-01-01 RX ORDER — ONDANSETRON 2 MG/ML
4 INJECTION INTRAMUSCULAR; INTRAVENOUS ONCE
Status: COMPLETED | OUTPATIENT
Start: 2021-01-01 | End: 2021-01-01

## 2021-01-01 RX ORDER — PANTOPRAZOLE SODIUM 40 MG/10ML
40 INJECTION, POWDER, LYOPHILIZED, FOR SOLUTION INTRAVENOUS 2 TIMES DAILY
Status: DISCONTINUED | OUTPATIENT
Start: 2021-01-01 | End: 2021-01-01 | Stop reason: HOSPADM

## 2021-01-01 RX ORDER — SODIUM CHLORIDE 0.9 % (FLUSH) 0.9 %
5-40 SYRINGE (ML) INJECTION EVERY 12 HOURS SCHEDULED
Status: DISCONTINUED | OUTPATIENT
Start: 2021-01-01 | End: 2021-01-01 | Stop reason: HOSPADM

## 2021-01-01 RX ORDER — LIDOCAINE HYDROCHLORIDE 10 MG/ML
10 INJECTION, SOLUTION EPIDURAL; INFILTRATION; INTRACAUDAL; PERINEURAL ONCE
Status: COMPLETED | OUTPATIENT
Start: 2021-01-01 | End: 2021-01-01

## 2021-01-01 RX ORDER — POTASSIUM CHLORIDE 20 MEQ/1
40 TABLET, EXTENDED RELEASE ORAL ONCE
Status: COMPLETED | OUTPATIENT
Start: 2021-01-01 | End: 2021-01-01

## 2021-01-01 RX ORDER — FINASTERIDE 5 MG/1
5 TABLET, FILM COATED ORAL DAILY
Status: DISCONTINUED | OUTPATIENT
Start: 2021-01-01 | End: 2021-01-01 | Stop reason: HOSPADM

## 2021-01-01 RX ORDER — HYDROCODONE BITARTRATE AND ACETAMINOPHEN 5; 325 MG/1; MG/1
1 TABLET ORAL
Status: DISCONTINUED | OUTPATIENT
Start: 2021-01-01 | End: 2021-01-01 | Stop reason: HOSPADM

## 2021-01-01 RX ORDER — SODIUM CHLORIDE 0.9 % (FLUSH) 0.9 %
5-40 SYRINGE (ML) INJECTION PRN
Status: DISCONTINUED | OUTPATIENT
Start: 2021-01-01 | End: 2021-01-01 | Stop reason: HOSPADM

## 2021-01-01 RX ORDER — ONDANSETRON 2 MG/ML
4 INJECTION INTRAMUSCULAR; INTRAVENOUS EVERY 6 HOURS PRN
Status: DISCONTINUED | OUTPATIENT
Start: 2021-01-01 | End: 2021-01-01 | Stop reason: HOSPADM

## 2021-01-01 RX ORDER — HYDROCODONE BITARTRATE AND ACETAMINOPHEN 5; 325 MG/1; MG/1
1 TABLET ORAL EVERY 4 HOURS PRN
Qty: 30 TABLET | Refills: 0 | Status: SHIPPED | OUTPATIENT
Start: 2021-01-01 | End: 2021-01-01

## 2021-01-01 RX ORDER — TAMSULOSIN HYDROCHLORIDE 0.4 MG/1
0.4 CAPSULE ORAL ONCE
Status: COMPLETED | OUTPATIENT
Start: 2021-01-01 | End: 2021-01-01

## 2021-01-01 RX ORDER — VALSARTAN 80 MG/1
80 TABLET ORAL DAILY
Status: DISCONTINUED | OUTPATIENT
Start: 2021-01-01 | End: 2021-01-01 | Stop reason: HOSPADM

## 2021-01-01 RX ORDER — CARVEDILOL 6.25 MG/1
6.25 TABLET ORAL 2 TIMES DAILY WITH MEALS
Qty: 60 TABLET | Refills: 3 | Status: SHIPPED | OUTPATIENT
Start: 2021-01-01

## 2021-01-01 RX ORDER — LEVOFLOXACIN 500 MG/1
500 TABLET, FILM COATED ORAL DAILY
Qty: 10 TABLET | Refills: 0 | Status: ON HOLD | OUTPATIENT
Start: 2021-01-01 | End: 2021-01-01 | Stop reason: HOSPADM

## 2021-01-01 RX ORDER — PANTOPRAZOLE SODIUM 40 MG/1
40 TABLET, DELAYED RELEASE ORAL
Status: DISCONTINUED | OUTPATIENT
Start: 2021-01-01 | End: 2021-01-01

## 2021-01-01 RX ORDER — DEXAMETHASONE SODIUM PHOSPHATE 4 MG/ML
INJECTION, SOLUTION INTRA-ARTICULAR; INTRALESIONAL; INTRAMUSCULAR; INTRAVENOUS; SOFT TISSUE PRN
Status: DISCONTINUED | OUTPATIENT
Start: 2021-01-01 | End: 2021-01-01 | Stop reason: SDUPTHER

## 2021-01-01 RX ORDER — TAMSULOSIN HYDROCHLORIDE 0.4 MG/1
0.4 CAPSULE ORAL 2 TIMES DAILY
Status: DISCONTINUED | OUTPATIENT
Start: 2021-01-01 | End: 2021-01-01 | Stop reason: HOSPADM

## 2021-01-01 RX ORDER — EPHEDRINE SULFATE/0.9% NACL/PF 50 MG/5 ML
SYRINGE (ML) INTRAVENOUS PRN
Status: DISCONTINUED | OUTPATIENT
Start: 2021-01-01 | End: 2021-01-01 | Stop reason: SDUPTHER

## 2021-01-01 RX ORDER — FENTANYL CITRATE 50 UG/ML
INJECTION, SOLUTION INTRAMUSCULAR; INTRAVENOUS
Status: COMPLETED | OUTPATIENT
Start: 2021-01-01 | End: 2021-01-01

## 2021-01-01 RX ORDER — MONTELUKAST SODIUM 10 MG/1
TABLET ORAL
Qty: 90 TABLET | Refills: 3 | Status: SHIPPED | OUTPATIENT
Start: 2021-01-01 | End: 2021-01-01

## 2021-01-01 RX ORDER — BACITRACIN ZINC AND POLYMYXIN B SULFATE 500; 1000 [USP'U]/G; [USP'U]/G
OINTMENT TOPICAL
Status: COMPLETED | OUTPATIENT
Start: 2021-01-01 | End: 2021-01-01

## 2021-01-01 RX ORDER — OXYCODONE AND ACETAMINOPHEN 10; 325 MG/1; MG/1
TABLET ORAL
COMMUNITY
Start: 2021-01-01 | End: 2021-01-01 | Stop reason: SDUPTHER

## 2021-01-01 RX ORDER — METHYLPREDNISOLONE 4 MG/1
4 TABLET ORAL SEE ADMIN INSTRUCTIONS
Qty: 1 KIT | Refills: 0 | Status: ON HOLD | OUTPATIENT
Start: 2021-01-01 | End: 2021-01-01 | Stop reason: HOSPADM

## 2021-01-01 RX ORDER — SODIUM CHLORIDE 0.9 % (FLUSH) 0.9 %
10 SYRINGE (ML) INJECTION PRN
Status: DISCONTINUED | OUTPATIENT
Start: 2021-01-01 | End: 2021-01-01 | Stop reason: HOSPADM

## 2021-01-01 RX ORDER — HEPARIN SODIUM 10000 [USP'U]/100ML
5-30 INJECTION, SOLUTION INTRAVENOUS CONTINUOUS
Status: DISCONTINUED | OUTPATIENT
Start: 2021-01-01 | End: 2021-01-01

## 2021-01-01 RX ORDER — ACETAMINOPHEN 650 MG/1
650 SUPPOSITORY RECTAL EVERY 6 HOURS PRN
Status: DISCONTINUED | OUTPATIENT
Start: 2021-01-01 | End: 2021-01-01 | Stop reason: HOSPADM

## 2021-01-01 RX ORDER — LIDOCAINE 4 G/G
1 PATCH TOPICAL DAILY
Status: DISCONTINUED | OUTPATIENT
Start: 2021-01-01 | End: 2021-01-01 | Stop reason: HOSPADM

## 2021-01-01 RX ORDER — PANTOPRAZOLE SODIUM 40 MG/10ML
40 INJECTION, POWDER, LYOPHILIZED, FOR SOLUTION INTRAVENOUS 2 TIMES DAILY
Status: DISCONTINUED | OUTPATIENT
Start: 2021-01-01 | End: 2021-01-01 | Stop reason: DRUGHIGH

## 2021-01-01 RX ORDER — OXYCODONE AND ACETAMINOPHEN 10; 325 MG/1; MG/1
1 TABLET ORAL DAILY PRN
Qty: 30 TABLET | Refills: 0 | Status: SHIPPED | OUTPATIENT
Start: 2021-01-01 | End: 2021-01-01

## 2021-01-01 RX ORDER — PANTOPRAZOLE SODIUM 40 MG/10ML
80 INJECTION, POWDER, LYOPHILIZED, FOR SOLUTION INTRAVENOUS ONCE
Status: COMPLETED | OUTPATIENT
Start: 2021-01-01 | End: 2021-01-01

## 2021-01-01 RX ORDER — 0.9 % SODIUM CHLORIDE 0.9 %
1000 INTRAVENOUS SOLUTION INTRAVENOUS ONCE
Status: COMPLETED | OUTPATIENT
Start: 2021-01-01 | End: 2021-01-01

## 2021-01-01 RX ORDER — ASPIRIN 81 MG/1
81 TABLET ORAL DAILY
Qty: 30 TABLET | Refills: 3 | Status: SHIPPED | OUTPATIENT
Start: 2021-01-01

## 2021-01-01 RX ORDER — LIDOCAINE 4 G/G
1 PATCH TOPICAL DAILY
Qty: 10 PATCH | Refills: 5 | Status: SHIPPED | OUTPATIENT
Start: 2021-01-01 | End: 2021-01-01

## 2021-01-01 RX ORDER — NITROGLYCERIN 0.4 MG/1
TABLET SUBLINGUAL
Qty: 25 TABLET | Refills: 3 | Status: SHIPPED | OUTPATIENT
Start: 2021-01-01

## 2021-01-01 RX ORDER — TAMSULOSIN HYDROCHLORIDE 0.4 MG/1
0.4 CAPSULE ORAL NIGHTLY
Status: DISCONTINUED | OUTPATIENT
Start: 2021-01-01 | End: 2021-01-01

## 2021-01-01 RX ORDER — GUAIFENESIN 600 MG/1
600 TABLET, EXTENDED RELEASE ORAL 2 TIMES DAILY
Qty: 30 TABLET | Refills: 0 | Status: SHIPPED | OUTPATIENT
Start: 2021-01-01 | End: 2021-01-01

## 2021-01-01 RX ORDER — ONDANSETRON 2 MG/ML
4 INJECTION INTRAMUSCULAR; INTRAVENOUS
Status: DISCONTINUED | OUTPATIENT
Start: 2021-01-01 | End: 2021-01-01 | Stop reason: HOSPADM

## 2021-01-01 RX ORDER — OXYCODONE HYDROCHLORIDE 5 MG/1
5 TABLET ORAL EVERY 4 HOURS PRN
Status: DISCONTINUED | OUTPATIENT
Start: 2021-01-01 | End: 2021-01-01

## 2021-01-01 RX ORDER — ACETAMINOPHEN 325 MG/1
650 TABLET ORAL EVERY 4 HOURS PRN
Status: DISCONTINUED | OUTPATIENT
Start: 2021-01-01 | End: 2021-01-01 | Stop reason: HOSPADM

## 2021-01-01 RX ORDER — OXYCODONE AND ACETAMINOPHEN 10; 325 MG/1; MG/1
1 TABLET ORAL DAILY PRN
Status: DISCONTINUED | OUTPATIENT
Start: 2021-01-01 | End: 2021-01-01

## 2021-01-01 RX ORDER — ATORVASTATIN CALCIUM 40 MG/1
40 TABLET, FILM COATED ORAL NIGHTLY
Status: DISCONTINUED | OUTPATIENT
Start: 2021-01-01 | End: 2021-01-01 | Stop reason: HOSPADM

## 2021-01-01 RX ORDER — ONDANSETRON 2 MG/ML
4 INJECTION INTRAMUSCULAR; INTRAVENOUS EVERY 6 HOURS PRN
Status: CANCELLED | OUTPATIENT
Start: 2021-01-01

## 2021-01-01 RX ORDER — FINASTERIDE 5 MG/1
5 TABLET, FILM COATED ORAL DAILY
Qty: 30 TABLET | Refills: 3 | Status: SHIPPED | OUTPATIENT
Start: 2021-01-01

## 2021-01-01 RX ORDER — LIDOCAINE HYDROCHLORIDE AND EPINEPHRINE 10; 10 MG/ML; UG/ML
INJECTION, SOLUTION INFILTRATION; PERINEURAL
Status: COMPLETED | OUTPATIENT
Start: 2021-01-01 | End: 2021-01-01

## 2021-01-01 RX ORDER — KETOROLAC TROMETHAMINE 15 MG/ML
INJECTION, SOLUTION INTRAMUSCULAR; INTRAVENOUS
Status: COMPLETED | OUTPATIENT
Start: 2021-01-01 | End: 2021-01-01

## 2021-01-01 RX ORDER — TAMSULOSIN HYDROCHLORIDE 0.4 MG/1
0.4 CAPSULE ORAL 2 TIMES DAILY
Qty: 30 CAPSULE | Refills: 3 | Status: SHIPPED | OUTPATIENT
Start: 2021-01-01

## 2021-01-01 RX ORDER — MONTELUKAST SODIUM 10 MG/1
10 TABLET ORAL NIGHTLY
Status: DISCONTINUED | OUTPATIENT
Start: 2021-01-01 | End: 2021-01-01 | Stop reason: HOSPADM

## 2021-01-01 RX ORDER — CALCIUM CARBONATE 200(500)MG
500 TABLET,CHEWABLE ORAL 3 TIMES DAILY PRN
Status: DISCONTINUED | OUTPATIENT
Start: 2021-01-01 | End: 2021-01-01 | Stop reason: HOSPADM

## 2021-01-01 RX ORDER — OXYCODONE AND ACETAMINOPHEN 10; 325 MG/1; MG/1
1 TABLET ORAL 2 TIMES DAILY PRN
Qty: 30 TABLET | Refills: 0 | Status: SHIPPED | OUTPATIENT
Start: 2021-01-01 | End: 2021-12-11

## 2021-01-01 RX ORDER — CEPHALEXIN 500 MG/1
500 CAPSULE ORAL 3 TIMES DAILY
Qty: 21 CAPSULE | Refills: 0 | Status: SHIPPED | OUTPATIENT
Start: 2021-01-01 | End: 2021-01-01

## 2021-01-01 RX ORDER — LABETALOL HYDROCHLORIDE 5 MG/ML
10 INJECTION, SOLUTION INTRAVENOUS ONCE
Status: COMPLETED | OUTPATIENT
Start: 2021-01-01 | End: 2021-01-01

## 2021-01-01 RX ORDER — MONTELUKAST SODIUM 10 MG/1
TABLET ORAL
Qty: 90 TABLET | Refills: 3 | OUTPATIENT
Start: 2021-01-01

## 2021-01-01 RX ORDER — HEPARIN SODIUM 1000 [USP'U]/ML
4000 INJECTION, SOLUTION INTRAVENOUS; SUBCUTANEOUS PRN
Status: DISCONTINUED | OUTPATIENT
Start: 2021-01-01 | End: 2021-01-01

## 2021-01-01 RX ORDER — ASPIRIN 81 MG/1
81 TABLET, CHEWABLE ORAL DAILY
Status: DISCONTINUED | OUTPATIENT
Start: 2021-01-01 | End: 2021-01-01

## 2021-01-01 RX ORDER — LABETALOL HYDROCHLORIDE 5 MG/ML
20 INJECTION, SOLUTION INTRAVENOUS ONCE
Status: COMPLETED | OUTPATIENT
Start: 2021-01-01 | End: 2021-01-01

## 2021-01-01 RX ORDER — ASPIRIN 81 MG/1
243 TABLET, CHEWABLE ORAL ONCE
Status: COMPLETED | OUTPATIENT
Start: 2021-01-01 | End: 2021-01-01

## 2021-01-01 RX ORDER — LANOLIN ALCOHOL/MO/W.PET/CERES
3 CREAM (GRAM) TOPICAL NIGHTLY PRN
Status: DISCONTINUED | OUTPATIENT
Start: 2021-01-01 | End: 2021-01-01 | Stop reason: HOSPADM

## 2021-01-01 RX ORDER — PROMETHAZINE HYDROCHLORIDE 25 MG/ML
12.5 INJECTION, SOLUTION INTRAMUSCULAR; INTRAVENOUS EVERY 6 HOURS PRN
Status: DISCONTINUED | OUTPATIENT
Start: 2021-01-01 | End: 2021-01-01 | Stop reason: HOSPADM

## 2021-01-01 RX ORDER — SODIUM CHLORIDE 9 MG/ML
INJECTION, SOLUTION INTRAVENOUS CONTINUOUS PRN
Status: DISCONTINUED | OUTPATIENT
Start: 2021-01-01 | End: 2021-01-01 | Stop reason: SDUPTHER

## 2021-01-01 RX ORDER — ACETAMINOPHEN 325 MG/1
650 TABLET ORAL EVERY 6 HOURS PRN
Status: DISCONTINUED | OUTPATIENT
Start: 2021-01-01 | End: 2021-01-01

## 2021-01-01 RX ORDER — FAMOTIDINE 20 MG/1
20 TABLET, FILM COATED ORAL 2 TIMES DAILY
Status: DISCONTINUED | OUTPATIENT
Start: 2021-01-01 | End: 2021-01-01

## 2021-01-01 RX ORDER — HYDROMORPHONE HCL 110MG/55ML
0.25 PATIENT CONTROLLED ANALGESIA SYRINGE INTRAVENOUS EVERY 5 MIN PRN
Status: DISCONTINUED | OUTPATIENT
Start: 2021-01-01 | End: 2021-01-01 | Stop reason: HOSPADM

## 2021-01-01 RX ORDER — ASPIRIN 81 MG/1
81 TABLET ORAL DAILY
Status: DISCONTINUED | OUTPATIENT
Start: 2021-01-01 | End: 2021-01-01 | Stop reason: HOSPADM

## 2021-01-01 RX ORDER — OXYCODONE HYDROCHLORIDE 5 MG/1
10 TABLET ORAL EVERY 4 HOURS
Status: DISCONTINUED | OUTPATIENT
Start: 2021-01-01 | End: 2021-01-01

## 2021-01-01 RX ORDER — OXYCODONE AND ACETAMINOPHEN 10; 325 MG/1; MG/1
1 TABLET ORAL EVERY 8 HOURS PRN
Qty: 9 TABLET | Refills: 0 | Status: SHIPPED | OUTPATIENT
Start: 2021-01-01 | End: 2021-01-01

## 2021-01-01 RX ORDER — POTASSIUM BICARBONATE 25 MEQ/1
50 TABLET, EFFERVESCENT ORAL ONCE
Status: COMPLETED | OUTPATIENT
Start: 2021-01-01 | End: 2021-01-01

## 2021-01-01 RX ORDER — NITROGLYCERIN 40 MG/1
1 PATCH TRANSDERMAL DAILY
Qty: 30 PATCH | Refills: 5 | Status: SHIPPED | OUTPATIENT
Start: 2021-01-01

## 2021-01-01 RX ORDER — OXYCODONE AND ACETAMINOPHEN 10; 325 MG/1; MG/1
1 TABLET ORAL 2 TIMES DAILY PRN
Status: DISCONTINUED | OUTPATIENT
Start: 2021-01-01 | End: 2021-01-01 | Stop reason: HOSPADM

## 2021-01-01 RX ORDER — PANTOPRAZOLE SODIUM 40 MG/1
40 TABLET, DELAYED RELEASE ORAL
Qty: 30 TABLET | Refills: 1 | Status: SHIPPED | OUTPATIENT
Start: 2021-01-01

## 2021-01-01 RX ORDER — BUPIVACAINE HYDROCHLORIDE 5 MG/ML
10 INJECTION, SOLUTION EPIDURAL; INTRACAUDAL
Status: COMPLETED | OUTPATIENT
Start: 2021-01-01 | End: 2021-01-01

## 2021-01-01 RX ORDER — ROCURONIUM BROMIDE 10 MG/ML
INJECTION, SOLUTION INTRAVENOUS PRN
Status: DISCONTINUED | OUTPATIENT
Start: 2021-01-01 | End: 2021-01-01 | Stop reason: SDUPTHER

## 2021-01-01 RX ORDER — HEPARIN SODIUM 1000 [USP'U]/ML
2000 INJECTION, SOLUTION INTRAVENOUS; SUBCUTANEOUS PRN
Status: DISCONTINUED | OUTPATIENT
Start: 2021-01-01 | End: 2021-01-01

## 2021-01-01 RX ORDER — LIDOCAINE HYDROCHLORIDE 10 MG/ML
1 INJECTION, SOLUTION EPIDURAL; INFILTRATION; INTRACAUDAL; PERINEURAL
Status: DISCONTINUED | OUTPATIENT
Start: 2021-01-01 | End: 2021-01-01 | Stop reason: HOSPADM

## 2021-01-01 RX ORDER — SODIUM CHLORIDE 0.9 % (FLUSH) 0.9 %
10 SYRINGE (ML) INJECTION EVERY 12 HOURS SCHEDULED
Status: DISCONTINUED | OUTPATIENT
Start: 2021-01-01 | End: 2021-01-01 | Stop reason: HOSPADM

## 2021-01-01 RX ORDER — POLYETHYLENE GLYCOL 3350 17 G/17G
17 POWDER, FOR SOLUTION ORAL DAILY
Status: DISCONTINUED | OUTPATIENT
Start: 2021-01-01 | End: 2021-01-01 | Stop reason: HOSPADM

## 2021-01-01 RX ORDER — MONTELUKAST SODIUM 10 MG/1
10 TABLET ORAL NIGHTLY
Status: DISCONTINUED | OUTPATIENT
Start: 2021-01-01 | End: 2021-01-01 | Stop reason: ALTCHOICE

## 2021-01-01 RX ORDER — DEXAMETHASONE 6 MG/1
6 TABLET ORAL 2 TIMES DAILY WITH MEALS
Qty: 28 TABLET | Refills: 1 | Status: SHIPPED | OUTPATIENT
Start: 2021-01-01 | End: 2021-01-01

## 2021-01-01 RX ORDER — FENTANYL CITRATE 50 UG/ML
INJECTION, SOLUTION INTRAMUSCULAR; INTRAVENOUS PRN
Status: DISCONTINUED | OUTPATIENT
Start: 2021-01-01 | End: 2021-01-01 | Stop reason: SDUPTHER

## 2021-01-01 RX ORDER — FLUTICASONE PROPIONATE 50 MCG
1 SPRAY, SUSPENSION (ML) NASAL DAILY
Qty: 1 BOTTLE | Refills: 1 | Status: SHIPPED | OUTPATIENT
Start: 2021-01-01 | End: 2021-01-01

## 2021-01-01 RX ORDER — MONTELUKAST SODIUM 10 MG/1
TABLET ORAL
Qty: 90 TABLET | Refills: 3 | Status: SHIPPED | OUTPATIENT
Start: 2021-01-01

## 2021-01-01 RX ADMIN — ONDANSETRON 4 MG: 2 INJECTION INTRAMUSCULAR; INTRAVENOUS at 21:40

## 2021-01-01 RX ADMIN — Medication 10 ML: at 09:20

## 2021-01-01 RX ADMIN — HEPARIN SODIUM 12 UNITS/KG/HR: 10000 INJECTION, SOLUTION INTRAVENOUS at 05:10

## 2021-01-01 RX ADMIN — PANTOPRAZOLE SODIUM 40 MG: 40 INJECTION, POWDER, FOR SOLUTION INTRAVENOUS at 21:14

## 2021-01-01 RX ADMIN — OXYCODONE 10 MG: 5 TABLET ORAL at 20:29

## 2021-01-01 RX ADMIN — ATORVASTATIN CALCIUM 40 MG: 40 TABLET, FILM COATED ORAL at 20:06

## 2021-01-01 RX ADMIN — HYDRALAZINE HYDROCHLORIDE 10 MG: 20 INJECTION INTRAMUSCULAR; INTRAVENOUS at 20:06

## 2021-01-01 RX ADMIN — PANTOPRAZOLE SODIUM 40 MG: 40 INJECTION, POWDER, FOR SOLUTION INTRAVENOUS at 20:29

## 2021-01-01 RX ADMIN — IOPAMIDOL 75 ML: 755 INJECTION, SOLUTION INTRAVENOUS at 14:42

## 2021-01-01 RX ADMIN — CLOPIDOGREL BISULFATE 75 MG: 75 TABLET ORAL at 08:12

## 2021-01-01 RX ADMIN — TAMSULOSIN HYDROCHLORIDE 0.4 MG: 0.4 CAPSULE ORAL at 21:38

## 2021-01-01 RX ADMIN — PANTOPRAZOLE SODIUM 40 MG: 40 INJECTION, POWDER, FOR SOLUTION INTRAVENOUS at 08:01

## 2021-01-01 RX ADMIN — Medication 10 ML: at 20:29

## 2021-01-01 RX ADMIN — SODIUM CHLORIDE: 9 INJECTION, SOLUTION INTRAVENOUS at 17:05

## 2021-01-01 RX ADMIN — PANTOPRAZOLE SODIUM 40 MG: 40 INJECTION, POWDER, FOR SOLUTION INTRAVENOUS at 09:42

## 2021-01-01 RX ADMIN — SODIUM CHLORIDE: 9 INJECTION, SOLUTION INTRAVENOUS at 11:22

## 2021-01-01 RX ADMIN — VALSARTAN 80 MG: 80 TABLET, FILM COATED ORAL at 09:42

## 2021-01-01 RX ADMIN — CEFTRIAXONE 2000 MG: 2 INJECTION, POWDER, FOR SOLUTION INTRAMUSCULAR; INTRAVENOUS at 18:05

## 2021-01-01 RX ADMIN — SODIUM CHLORIDE: 9 INJECTION, SOLUTION INTRAVENOUS at 20:44

## 2021-01-01 RX ADMIN — TAMSULOSIN HYDROCHLORIDE 0.4 MG: 0.4 CAPSULE ORAL at 14:28

## 2021-01-01 RX ADMIN — MORPHINE SULFATE 2 MG: 2 INJECTION, SOLUTION INTRAMUSCULAR; INTRAVENOUS at 15:53

## 2021-01-01 RX ADMIN — SODIUM CHLORIDE: 9 INJECTION, SOLUTION INTRAVENOUS at 06:08

## 2021-01-01 RX ADMIN — MONTELUKAST SODIUM 10 MG: 10 TABLET, FILM COATED ORAL at 20:11

## 2021-01-01 RX ADMIN — CEFEPIME HYDROCHLORIDE 2000 MG: 2 INJECTION, POWDER, FOR SOLUTION INTRAVENOUS at 18:22

## 2021-01-01 RX ADMIN — ONDANSETRON 4 MG: 2 INJECTION INTRAMUSCULAR; INTRAVENOUS at 09:43

## 2021-01-01 RX ADMIN — ALTEPLASE 1 MG: 2.2 INJECTION, POWDER, LYOPHILIZED, FOR SOLUTION INTRAVENOUS at 16:25

## 2021-01-01 RX ADMIN — SODIUM CHLORIDE 8 MG/HR: 9 INJECTION, SOLUTION INTRAVENOUS at 15:36

## 2021-01-01 RX ADMIN — ONDANSETRON 4 MG: 2 INJECTION INTRAMUSCULAR; INTRAVENOUS at 17:04

## 2021-01-01 RX ADMIN — SODIUM CHLORIDE, PRESERVATIVE FREE 10 ML: 5 INJECTION INTRAVENOUS at 08:09

## 2021-01-01 RX ADMIN — METOPROLOL SUCCINATE 25 MG: 25 TABLET, EXTENDED RELEASE ORAL at 09:54

## 2021-01-01 RX ADMIN — ATORVASTATIN CALCIUM 40 MG: 40 TABLET, FILM COATED ORAL at 20:11

## 2021-01-01 RX ADMIN — IOHEXOL 50 ML: 240 INJECTION, SOLUTION INTRATHECAL; INTRAVASCULAR; INTRAVENOUS; ORAL at 13:37

## 2021-01-01 RX ADMIN — FENTANYL CITRATE 50 MCG: 50 INJECTION, SOLUTION INTRAMUSCULAR; INTRAVENOUS at 13:36

## 2021-01-01 RX ADMIN — VALSARTAN 80 MG: 80 TABLET, FILM COATED ORAL at 08:23

## 2021-01-01 RX ADMIN — METRONIDAZOLE 500 MG: 500 INJECTION, SOLUTION INTRAVENOUS at 14:04

## 2021-01-01 RX ADMIN — PROMETHAZINE HYDROCHLORIDE 12.5 MG: 25 INJECTION INTRAMUSCULAR; INTRAVENOUS at 01:56

## 2021-01-01 RX ADMIN — VANCOMYCIN HYDROCHLORIDE 1000 MG: 1 INJECTION, POWDER, LYOPHILIZED, FOR SOLUTION INTRAVENOUS at 10:17

## 2021-01-01 RX ADMIN — MORPHINE SULFATE 2 MG: 2 INJECTION, SOLUTION INTRAMUSCULAR; INTRAVENOUS at 09:42

## 2021-01-01 RX ADMIN — VALSARTAN 80 MG: 80 TABLET, FILM COATED ORAL at 08:34

## 2021-01-01 RX ADMIN — POLYETHYLENE GLYCOL 3350 17 G: 17 POWDER, FOR SOLUTION ORAL at 09:24

## 2021-01-01 RX ADMIN — HYDRALAZINE HYDROCHLORIDE 10 MG: 20 INJECTION INTRAMUSCULAR; INTRAVENOUS at 10:11

## 2021-01-01 RX ADMIN — CEFEPIME HYDROCHLORIDE 2000 MG: 2 INJECTION, POWDER, FOR SOLUTION INTRAVENOUS at 00:39

## 2021-01-01 RX ADMIN — TAMSULOSIN HYDROCHLORIDE 0.4 MG: 0.4 CAPSULE ORAL at 08:05

## 2021-01-01 RX ADMIN — NALOXEGOL OXALATE 25 MG: 25 TABLET, FILM COATED ORAL at 08:01

## 2021-01-01 RX ADMIN — MELATONIN TAB 3 MG 3 MG: 3 TAB at 23:10

## 2021-01-01 RX ADMIN — LIDOCAINE HYDROCHLORIDE 80 MG: 20 INJECTION, SOLUTION EPIDURAL; INFILTRATION; INTRACAUDAL; PERINEURAL at 14:36

## 2021-01-01 RX ADMIN — ATORVASTATIN CALCIUM 40 MG: 40 TABLET, FILM COATED ORAL at 21:38

## 2021-01-01 RX ADMIN — TAMSULOSIN HYDROCHLORIDE 0.4 MG: 0.4 CAPSULE ORAL at 09:24

## 2021-01-01 RX ADMIN — OXYCODONE 10 MG: 5 TABLET ORAL at 20:05

## 2021-01-01 RX ADMIN — SODIUM CHLORIDE: 9 INJECTION, SOLUTION INTRAVENOUS at 22:30

## 2021-01-01 RX ADMIN — PANTOPRAZOLE SODIUM 40 MG: 40 INJECTION, POWDER, FOR SOLUTION INTRAVENOUS at 22:05

## 2021-01-01 RX ADMIN — CEFEPIME HYDROCHLORIDE 2000 MG: 2 INJECTION, POWDER, FOR SOLUTION INTRAVENOUS at 03:10

## 2021-01-01 RX ADMIN — CEFTRIAXONE 2000 MG: 2 INJECTION, POWDER, FOR SOLUTION INTRAMUSCULAR; INTRAVENOUS at 17:51

## 2021-01-01 RX ADMIN — TAMSULOSIN HYDROCHLORIDE 0.4 MG: 0.4 CAPSULE ORAL at 20:11

## 2021-01-01 RX ADMIN — PANTOPRAZOLE SODIUM 40 MG: 40 INJECTION, POWDER, FOR SOLUTION INTRAVENOUS at 20:04

## 2021-01-01 RX ADMIN — MORPHINE SULFATE 2 MG: 2 INJECTION, SOLUTION INTRAMUSCULAR; INTRAVENOUS at 02:00

## 2021-01-01 RX ADMIN — NITROGLYCERIN 0.5 INCH: 20 OINTMENT TOPICAL at 06:30

## 2021-01-01 RX ADMIN — SODIUM CHLORIDE: 9 INJECTION, SOLUTION INTRAVENOUS at 22:22

## 2021-01-01 RX ADMIN — VALSARTAN 80 MG: 80 TABLET ORAL at 10:04

## 2021-01-01 RX ADMIN — VANCOMYCIN HYDROCHLORIDE 1250 MG: 10 INJECTION, POWDER, LYOPHILIZED, FOR SOLUTION INTRAVENOUS at 19:46

## 2021-01-01 RX ADMIN — HYDRALAZINE HYDROCHLORIDE 10 MG: 20 INJECTION INTRAMUSCULAR; INTRAVENOUS at 04:15

## 2021-01-01 RX ADMIN — NALOXEGOL OXALATE 25 MG: 25 TABLET, FILM COATED ORAL at 09:28

## 2021-01-01 RX ADMIN — TAMSULOSIN HYDROCHLORIDE 0.4 MG: 0.4 CAPSULE ORAL at 21:14

## 2021-01-01 RX ADMIN — FINASTERIDE 5 MG: 5 TABLET, FILM COATED ORAL at 08:45

## 2021-01-01 RX ADMIN — ATORVASTATIN CALCIUM 40 MG: 40 TABLET, FILM COATED ORAL at 22:06

## 2021-01-01 RX ADMIN — OXYCODONE AND ACETAMINOPHEN 1 TABLET: 10; 325 TABLET ORAL at 06:31

## 2021-01-01 RX ADMIN — IOPAMIDOL 75 ML: 755 INJECTION, SOLUTION INTRAVENOUS at 01:43

## 2021-01-01 RX ADMIN — ATORVASTATIN CALCIUM 40 MG: 40 TABLET, FILM COATED ORAL at 20:29

## 2021-01-01 RX ADMIN — METRONIDAZOLE 500 MG: 500 INJECTION, SOLUTION INTRAVENOUS at 22:28

## 2021-01-01 RX ADMIN — METRONIDAZOLE 500 MG: 500 INJECTION, SOLUTION INTRAVENOUS at 22:37

## 2021-01-01 RX ADMIN — MELATONIN TAB 3 MG 3 MG: 3 TAB at 00:26

## 2021-01-01 RX ADMIN — FINASTERIDE 5 MG: 5 TABLET, FILM COATED ORAL at 10:04

## 2021-01-01 RX ADMIN — METRONIDAZOLE 500 MG: 500 INJECTION, SOLUTION INTRAVENOUS at 15:32

## 2021-01-01 RX ADMIN — METRONIDAZOLE 500 MG: 500 INJECTION, SOLUTION INTRAVENOUS at 13:06

## 2021-01-01 RX ADMIN — POTASSIUM CHLORIDE 40 MEQ: 1500 TABLET, EXTENDED RELEASE ORAL at 08:34

## 2021-01-01 RX ADMIN — MORPHINE SULFATE 2 MG: 2 INJECTION, SOLUTION INTRAMUSCULAR; INTRAVENOUS at 00:36

## 2021-01-01 RX ADMIN — GADOBENATE DIMEGLUMINE 17 ML: 529 INJECTION, SOLUTION INTRAVENOUS at 18:02

## 2021-01-01 RX ADMIN — SODIUM CHLORIDE: 9 INJECTION, SOLUTION INTRAVENOUS at 14:03

## 2021-01-01 RX ADMIN — OXYCODONE 10 MG: 5 TABLET ORAL at 00:07

## 2021-01-01 RX ADMIN — LABETALOL HYDROCHLORIDE 10 MG: 5 INJECTION INTRAVENOUS at 19:02

## 2021-01-01 RX ADMIN — DEXAMETHASONE SODIUM PHOSPHATE 10 MG: 4 INJECTION, SOLUTION INTRAMUSCULAR; INTRAVENOUS at 08:40

## 2021-01-01 RX ADMIN — ASPIRIN 81 MG 243 MG: 81 TABLET ORAL at 01:27

## 2021-01-01 RX ADMIN — PANTOPRAZOLE SODIUM 40 MG: 40 INJECTION, POWDER, FOR SOLUTION INTRAVENOUS at 21:38

## 2021-01-01 RX ADMIN — PROPOFOL 100 MCG/KG/MIN: 10 INJECTION, EMULSION INTRAVENOUS at 14:36

## 2021-01-01 RX ADMIN — VANCOMYCIN HYDROCHLORIDE 1000 MG: 1 INJECTION, POWDER, LYOPHILIZED, FOR SOLUTION INTRAVENOUS at 20:08

## 2021-01-01 RX ADMIN — MELATONIN TAB 3 MG 3 MG: 3 TAB at 00:13

## 2021-01-01 RX ADMIN — METRONIDAZOLE 500 MG: 500 INJECTION, SOLUTION INTRAVENOUS at 06:27

## 2021-01-01 RX ADMIN — METRONIDAZOLE 500 MG: 500 INJECTION, SOLUTION INTRAVENOUS at 06:12

## 2021-01-01 RX ADMIN — MELATONIN TAB 3 MG 3 MG: 3 TAB at 03:10

## 2021-01-01 RX ADMIN — ASPIRIN 81 MG: 81 TABLET, COATED ORAL at 08:05

## 2021-01-01 RX ADMIN — VALSARTAN 80 MG: 80 TABLET, FILM COATED ORAL at 09:28

## 2021-01-01 RX ADMIN — MELATONIN TAB 3 MG 3 MG: 3 TAB at 23:44

## 2021-01-01 RX ADMIN — CEFTRIAXONE 2000 MG: 2 INJECTION, POWDER, FOR SOLUTION INTRAMUSCULAR; INTRAVENOUS at 17:45

## 2021-01-01 RX ADMIN — Medication 10 ML: at 21:38

## 2021-01-01 RX ADMIN — HYDRALAZINE HYDROCHLORIDE 10 MG: 20 INJECTION INTRAMUSCULAR; INTRAVENOUS at 06:47

## 2021-01-01 RX ADMIN — VALSARTAN 80 MG: 80 TABLET ORAL at 08:12

## 2021-01-01 RX ADMIN — METRONIDAZOLE 500 MG: 500 INJECTION, SOLUTION INTRAVENOUS at 14:01

## 2021-01-01 RX ADMIN — ATORVASTATIN CALCIUM 40 MG: 40 TABLET, FILM COATED ORAL at 20:38

## 2021-01-01 RX ADMIN — OXYCODONE 5 MG: 5 TABLET ORAL at 13:34

## 2021-01-01 RX ADMIN — HEPARIN SODIUM 4000 UNITS: 1000 INJECTION INTRAVENOUS; SUBCUTANEOUS at 05:06

## 2021-01-01 RX ADMIN — Medication 10 ML: at 10:48

## 2021-01-01 RX ADMIN — NITROGLYCERIN 0.5 INCH: 20 OINTMENT TOPICAL at 14:26

## 2021-01-01 RX ADMIN — ALUMINUM HYDROXIDE, MAGNESIUM HYDROXIDE, AND SIMETHICONE: 200; 200; 20 SUSPENSION ORAL at 01:32

## 2021-01-01 RX ADMIN — MORPHINE SULFATE 4 MG: 4 INJECTION INTRAVENOUS at 11:31

## 2021-01-01 RX ADMIN — METRONIDAZOLE 500 MG: 500 INJECTION, SOLUTION INTRAVENOUS at 05:29

## 2021-01-01 RX ADMIN — NITROGLYCERIN 0.5 INCH: 20 OINTMENT TOPICAL at 17:18

## 2021-01-01 RX ADMIN — FINASTERIDE 5 MG: 5 TABLET, FILM COATED ORAL at 08:05

## 2021-01-01 RX ADMIN — VALSARTAN 80 MG: 80 TABLET, FILM COATED ORAL at 08:48

## 2021-01-01 RX ADMIN — SODIUM CHLORIDE: 9 INJECTION, SOLUTION INTRAVENOUS at 22:39

## 2021-01-01 RX ADMIN — MELATONIN TAB 3 MG 3 MG: 3 TAB at 23:59

## 2021-01-01 RX ADMIN — METRONIDAZOLE 500 MG: 500 INJECTION, SOLUTION INTRAVENOUS at 22:19

## 2021-01-01 RX ADMIN — TAMSULOSIN HYDROCHLORIDE 0.4 MG: 0.4 CAPSULE ORAL at 20:27

## 2021-01-01 RX ADMIN — METRONIDAZOLE 500 MG: 500 INJECTION, SOLUTION INTRAVENOUS at 06:28

## 2021-01-01 RX ADMIN — MORPHINE SULFATE 4 MG: 4 INJECTION INTRAVENOUS at 12:46

## 2021-01-01 RX ADMIN — VALSARTAN 80 MG: 80 TABLET, FILM COATED ORAL at 09:24

## 2021-01-01 RX ADMIN — ASPIRIN 81 MG: 81 TABLET, COATED ORAL at 09:55

## 2021-01-01 RX ADMIN — TAMSULOSIN HYDROCHLORIDE 0.4 MG: 0.4 CAPSULE ORAL at 13:04

## 2021-01-01 RX ADMIN — MORPHINE SULFATE 2 MG: 2 INJECTION, SOLUTION INTRAMUSCULAR; INTRAVENOUS at 09:54

## 2021-01-01 RX ADMIN — OXYCODONE 10 MG: 5 TABLET ORAL at 20:10

## 2021-01-01 RX ADMIN — VANCOMYCIN HYDROCHLORIDE 1000 MG: 1 INJECTION, POWDER, LYOPHILIZED, FOR SOLUTION INTRAVENOUS at 10:48

## 2021-01-01 RX ADMIN — CEFEPIME HYDROCHLORIDE 2000 MG: 2 INJECTION, POWDER, FOR SOLUTION INTRAVENOUS at 11:22

## 2021-01-01 RX ADMIN — SODIUM CHLORIDE: 9 INJECTION, SOLUTION INTRAVENOUS at 20:26

## 2021-01-01 RX ADMIN — NALOXEGOL OXALATE 25 MG: 25 TABLET, FILM COATED ORAL at 14:28

## 2021-01-01 RX ADMIN — CEFEPIME HYDROCHLORIDE 2000 MG: 2 INJECTION, POWDER, FOR SOLUTION INTRAVENOUS at 10:49

## 2021-01-01 RX ADMIN — SODIUM CHLORIDE: 9 INJECTION, SOLUTION INTRAVENOUS at 15:28

## 2021-01-01 RX ADMIN — MONTELUKAST SODIUM 10 MG: 10 TABLET, FILM COATED ORAL at 20:27

## 2021-01-01 RX ADMIN — Medication 10 ML: at 08:45

## 2021-01-01 RX ADMIN — CEFEPIME HYDROCHLORIDE 2000 MG: 2 INJECTION, POWDER, FOR SOLUTION INTRAVENOUS at 09:40

## 2021-01-01 RX ADMIN — SODIUM CHLORIDE: 9 INJECTION, SOLUTION INTRAVENOUS at 21:22

## 2021-01-01 RX ADMIN — TAMSULOSIN HYDROCHLORIDE 0.4 MG: 0.4 CAPSULE ORAL at 09:55

## 2021-01-01 RX ADMIN — CEFEPIME HYDROCHLORIDE 2000 MG: 2 INJECTION, POWDER, FOR SOLUTION INTRAVENOUS at 17:08

## 2021-01-01 RX ADMIN — METRONIDAZOLE 500 MG: 500 INJECTION, SOLUTION INTRAVENOUS at 16:15

## 2021-01-01 RX ADMIN — SODIUM CHLORIDE: 9 INJECTION, SOLUTION INTRAVENOUS at 07:28

## 2021-01-01 RX ADMIN — MIDAZOLAM 1 MG: 1 INJECTION INTRAMUSCULAR; INTRAVENOUS at 13:36

## 2021-01-01 RX ADMIN — KETOROLAC TROMETHAMINE 30 MG: 15 INJECTION, SOLUTION INTRAMUSCULAR; INTRAVENOUS at 13:35

## 2021-01-01 RX ADMIN — ANTACID TABLETS 500 MG: 500 TABLET, CHEWABLE ORAL at 06:13

## 2021-01-01 RX ADMIN — METRONIDAZOLE 500 MG: 500 INJECTION, SOLUTION INTRAVENOUS at 14:28

## 2021-01-01 RX ADMIN — CLOPIDOGREL BISULFATE 75 MG: 75 TABLET ORAL at 09:55

## 2021-01-01 RX ADMIN — NALOXEGOL OXALATE 25 MG: 25 TABLET, FILM COATED ORAL at 12:03

## 2021-01-01 RX ADMIN — CEFEPIME HYDROCHLORIDE 2000 MG: 2 INJECTION, POWDER, FOR SOLUTION INTRAVENOUS at 09:50

## 2021-01-01 RX ADMIN — OXYCODONE 10 MG: 5 TABLET ORAL at 08:45

## 2021-01-01 RX ADMIN — PANTOPRAZOLE SODIUM 40 MG: 40 TABLET, DELAYED RELEASE ORAL at 05:26

## 2021-01-01 RX ADMIN — MORPHINE SULFATE 2 MG: 2 INJECTION, SOLUTION INTRAMUSCULAR; INTRAVENOUS at 06:02

## 2021-01-01 RX ADMIN — METRONIDAZOLE 500 MG: 500 INJECTION, SOLUTION INTRAVENOUS at 21:53

## 2021-01-01 RX ADMIN — CEFTRIAXONE 2000 MG: 2 INJECTION, POWDER, FOR SOLUTION INTRAMUSCULAR; INTRAVENOUS at 11:32

## 2021-01-01 RX ADMIN — TAMSULOSIN HYDROCHLORIDE 0.4 MG: 0.4 CAPSULE ORAL at 11:24

## 2021-01-01 RX ADMIN — Medication 10 ML: at 22:05

## 2021-01-01 RX ADMIN — CEFEPIME HYDROCHLORIDE 2000 MG: 2 INJECTION, POWDER, FOR SOLUTION INTRAVENOUS at 01:50

## 2021-01-01 RX ADMIN — ONDANSETRON 4 MG: 2 INJECTION INTRAMUSCULAR; INTRAVENOUS at 05:43

## 2021-01-01 RX ADMIN — OXYCODONE 10 MG: 5 TABLET ORAL at 03:50

## 2021-01-01 RX ADMIN — VALSARTAN 80 MG: 80 TABLET ORAL at 08:05

## 2021-01-01 RX ADMIN — FENTANYL CITRATE 50 MCG: 50 INJECTION, SOLUTION INTRAMUSCULAR; INTRAVENOUS at 13:25

## 2021-01-01 RX ADMIN — OXYCODONE 10 MG: 5 TABLET ORAL at 18:03

## 2021-01-01 RX ADMIN — Medication 10 MG: at 09:06

## 2021-01-01 RX ADMIN — CEFEPIME HYDROCHLORIDE 2000 MG: 2 INJECTION, POWDER, FOR SOLUTION INTRAVENOUS at 01:02

## 2021-01-01 RX ADMIN — TAMSULOSIN HYDROCHLORIDE 0.4 MG: 0.4 CAPSULE ORAL at 20:29

## 2021-01-01 RX ADMIN — TAMSULOSIN HYDROCHLORIDE 0.4 MG: 0.4 CAPSULE ORAL at 08:13

## 2021-01-01 RX ADMIN — METRONIDAZOLE 500 MG: 500 INJECTION, SOLUTION INTRAVENOUS at 22:24

## 2021-01-01 RX ADMIN — OXYCODONE AND ACETAMINOPHEN 1 TABLET: 10; 325 TABLET ORAL at 08:12

## 2021-01-01 RX ADMIN — VALSARTAN 80 MG: 80 TABLET, FILM COATED ORAL at 08:01

## 2021-01-01 RX ADMIN — ATORVASTATIN CALCIUM 40 MG: 40 TABLET, FILM COATED ORAL at 21:14

## 2021-01-01 RX ADMIN — Medication 10 ML: at 21:37

## 2021-01-01 RX ADMIN — OXYCODONE 10 MG: 5 TABLET ORAL at 08:07

## 2021-01-01 RX ADMIN — VANCOMYCIN HYDROCHLORIDE 1000 MG: 1 INJECTION, POWDER, LYOPHILIZED, FOR SOLUTION INTRAVENOUS at 20:19

## 2021-01-01 RX ADMIN — CEFTRIAXONE 2000 MG: 2 INJECTION, POWDER, FOR SOLUTION INTRAMUSCULAR; INTRAVENOUS at 18:07

## 2021-01-01 RX ADMIN — METRONIDAZOLE 500 MG: 500 INJECTION, SOLUTION INTRAVENOUS at 06:02

## 2021-01-01 RX ADMIN — LIDOCAINE HYDROCHLORIDE 80 MG: 20 INJECTION, SOLUTION EPIDURAL; INFILTRATION; INTRACAUDAL; PERINEURAL at 08:34

## 2021-01-01 RX ADMIN — SODIUM CHLORIDE: 9 INJECTION, SOLUTION INTRAVENOUS at 06:13

## 2021-01-01 RX ADMIN — TAMSULOSIN HYDROCHLORIDE 0.4 MG: 0.4 CAPSULE ORAL at 08:45

## 2021-01-01 RX ADMIN — MORPHINE SULFATE 4 MG: 4 INJECTION INTRAVENOUS at 01:31

## 2021-01-01 RX ADMIN — IOPAMIDOL 60 ML: 408 INJECTION, SOLUTION INTRATHECAL at 14:35

## 2021-01-01 RX ADMIN — METRONIDAZOLE 500 MG: 500 INJECTION, SOLUTION INTRAVENOUS at 14:29

## 2021-01-01 RX ADMIN — CLOPIDOGREL BISULFATE 75 MG: 75 TABLET ORAL at 08:05

## 2021-01-01 RX ADMIN — CEFTRIAXONE 2000 MG: 2 INJECTION, POWDER, FOR SOLUTION INTRAMUSCULAR; INTRAVENOUS at 18:15

## 2021-01-01 RX ADMIN — MORPHINE SULFATE 2 MG: 2 INJECTION, SOLUTION INTRAMUSCULAR; INTRAVENOUS at 02:33

## 2021-01-01 RX ADMIN — METRONIDAZOLE 500 MG: 500 INJECTION, SOLUTION INTRAVENOUS at 06:39

## 2021-01-01 RX ADMIN — Medication 10 ML: at 22:19

## 2021-01-01 RX ADMIN — NITROGLYCERIN 0.5 INCH: 20 OINTMENT TOPICAL at 23:46

## 2021-01-01 RX ADMIN — POLYETHYLENE GLYCOL 3350 17 G: 17 POWDER, FOR SOLUTION ORAL at 14:28

## 2021-01-01 RX ADMIN — METRONIDAZOLE 500 MG: 500 INJECTION, SOLUTION INTRAVENOUS at 05:32

## 2021-01-01 RX ADMIN — IOPAMIDOL 76 ML: 755 INJECTION, SOLUTION INTRAVENOUS at 16:41

## 2021-01-01 RX ADMIN — METRONIDAZOLE 500 MG: 500 INJECTION, SOLUTION INTRAVENOUS at 06:30

## 2021-01-01 RX ADMIN — FINASTERIDE 5 MG: 5 TABLET, FILM COATED ORAL at 09:24

## 2021-01-01 RX ADMIN — MORPHINE SULFATE 2 MG: 2 INJECTION, SOLUTION INTRAMUSCULAR; INTRAVENOUS at 22:16

## 2021-01-01 RX ADMIN — SODIUM CHLORIDE, PRESERVATIVE FREE 10 ML: 5 INJECTION INTRAVENOUS at 09:58

## 2021-01-01 RX ADMIN — OXYCODONE 10 MG: 5 TABLET ORAL at 21:15

## 2021-01-01 RX ADMIN — ANTACID TABLETS 500 MG: 500 TABLET, CHEWABLE ORAL at 00:22

## 2021-01-01 RX ADMIN — SODIUM CHLORIDE: 9 INJECTION, SOLUTION INTRAVENOUS at 11:17

## 2021-01-01 RX ADMIN — CARVEDILOL 6.25 MG: 6.25 TABLET, FILM COATED ORAL at 08:05

## 2021-01-01 RX ADMIN — Medication 10 MG: at 08:50

## 2021-01-01 RX ADMIN — MONTELUKAST SODIUM 10 MG: 10 TABLET, FILM COATED ORAL at 20:38

## 2021-01-01 RX ADMIN — VANCOMYCIN HYDROCHLORIDE 1000 MG: 1 INJECTION, POWDER, LYOPHILIZED, FOR SOLUTION INTRAVENOUS at 08:25

## 2021-01-01 RX ADMIN — SODIUM CHLORIDE: 9 INJECTION, SOLUTION INTRAVENOUS at 06:10

## 2021-01-01 RX ADMIN — PHENYLEPHRINE HYDROCHLORIDE 100 MCG: 10 INJECTION INTRAVENOUS at 14:44

## 2021-01-01 RX ADMIN — PANTOPRAZOLE SODIUM 40 MG: 40 INJECTION, POWDER, FOR SOLUTION INTRAVENOUS at 13:37

## 2021-01-01 RX ADMIN — VALSARTAN 80 MG: 80 TABLET, FILM COATED ORAL at 22:28

## 2021-01-01 RX ADMIN — FINASTERIDE 5 MG: 5 TABLET, FILM COATED ORAL at 13:32

## 2021-01-01 RX ADMIN — METOPROLOL SUCCINATE 25 MG: 25 TABLET, EXTENDED RELEASE ORAL at 20:11

## 2021-01-01 RX ADMIN — Medication 10 ML: at 20:04

## 2021-01-01 RX ADMIN — MORPHINE SULFATE 4 MG: 4 INJECTION INTRAVENOUS at 19:58

## 2021-01-01 RX ADMIN — NALOXEGOL OXALATE 25 MG: 25 TABLET, FILM COATED ORAL at 08:27

## 2021-01-01 RX ADMIN — METOPROLOL SUCCINATE 25 MG: 25 TABLET, EXTENDED RELEASE ORAL at 08:12

## 2021-01-01 RX ADMIN — Medication 10 ML: at 20:03

## 2021-01-01 RX ADMIN — MORPHINE SULFATE 4 MG: 4 INJECTION INTRAVENOUS at 14:00

## 2021-01-01 RX ADMIN — HYDRALAZINE HYDROCHLORIDE 10 MG: 20 INJECTION INTRAMUSCULAR; INTRAVENOUS at 22:55

## 2021-01-01 RX ADMIN — PANTOPRAZOLE SODIUM 40 MG: 40 INJECTION, POWDER, FOR SOLUTION INTRAVENOUS at 20:09

## 2021-01-01 RX ADMIN — MORPHINE SULFATE 4 MG: 4 INJECTION INTRAVENOUS at 06:27

## 2021-01-01 RX ADMIN — BISACODYL 5 MG: 5 TABLET, COATED ORAL at 12:03

## 2021-01-01 RX ADMIN — TAMSULOSIN HYDROCHLORIDE 0.4 MG: 0.4 CAPSULE ORAL at 20:04

## 2021-01-01 RX ADMIN — SODIUM CHLORIDE: 9 INJECTION, SOLUTION INTRAVENOUS at 08:31

## 2021-01-01 RX ADMIN — LIDOCAINE HYDROCHLORIDE 10 ML: 10 INJECTION, SOLUTION EPIDURAL; INFILTRATION; INTRACAUDAL; PERINEURAL at 14:36

## 2021-01-01 RX ADMIN — FENTANYL CITRATE 50 MCG: 50 INJECTION, SOLUTION INTRAMUSCULAR; INTRAVENOUS at 13:29

## 2021-01-01 RX ADMIN — MIDAZOLAM 1 MG: 1 INJECTION INTRAMUSCULAR; INTRAVENOUS at 13:29

## 2021-01-01 RX ADMIN — PANTOPRAZOLE SODIUM 40 MG: 40 INJECTION, POWDER, FOR SOLUTION INTRAVENOUS at 08:31

## 2021-01-01 RX ADMIN — PANTOPRAZOLE SODIUM 40 MG: 40 INJECTION, POWDER, FOR SOLUTION INTRAVENOUS at 08:44

## 2021-01-01 RX ADMIN — Medication 10 ML: at 22:31

## 2021-01-01 RX ADMIN — TAMSULOSIN HYDROCHLORIDE 0.4 MG: 0.4 CAPSULE ORAL at 20:38

## 2021-01-01 RX ADMIN — PROMETHAZINE HYDROCHLORIDE 12.5 MG: 25 INJECTION INTRAMUSCULAR; INTRAVENOUS at 09:47

## 2021-01-01 RX ADMIN — CEFAZOLIN SODIUM 2000 MG: 10 INJECTION, POWDER, FOR SOLUTION INTRAVENOUS at 08:28

## 2021-01-01 RX ADMIN — MELATONIN TAB 3 MG 3 MG: 3 TAB at 23:51

## 2021-01-01 RX ADMIN — METRONIDAZOLE 500 MG: 500 INJECTION, SOLUTION INTRAVENOUS at 21:39

## 2021-01-01 RX ADMIN — MIDAZOLAM 1 MG: 1 INJECTION INTRAMUSCULAR; INTRAVENOUS at 13:25

## 2021-01-01 RX ADMIN — VALSARTAN 80 MG: 80 TABLET ORAL at 09:55

## 2021-01-01 RX ADMIN — ONDANSETRON 4 MG: 2 INJECTION INTRAMUSCULAR; INTRAVENOUS at 02:00

## 2021-01-01 RX ADMIN — OXYCODONE 10 MG: 5 TABLET ORAL at 13:59

## 2021-01-01 RX ADMIN — METRONIDAZOLE 500 MG: 500 INJECTION, SOLUTION INTRAVENOUS at 22:47

## 2021-01-01 RX ADMIN — TAMSULOSIN HYDROCHLORIDE 0.4 MG: 0.4 CAPSULE ORAL at 22:06

## 2021-01-01 RX ADMIN — SODIUM CHLORIDE: 9 INJECTION, SOLUTION INTRAVENOUS at 05:31

## 2021-01-01 RX ADMIN — PANTOPRAZOLE SODIUM 40 MG: 40 INJECTION, POWDER, FOR SOLUTION INTRAVENOUS at 09:24

## 2021-01-01 RX ADMIN — PANTOPRAZOLE SODIUM 80 MG: 40 INJECTION, POWDER, FOR SOLUTION INTRAVENOUS at 15:51

## 2021-01-01 RX ADMIN — ATORVASTATIN CALCIUM 40 MG: 40 TABLET, FILM COATED ORAL at 20:02

## 2021-01-01 RX ADMIN — HYDROMORPHONE HYDROCHLORIDE 1 MG: 1 INJECTION, SOLUTION INTRAMUSCULAR; INTRAVENOUS; SUBCUTANEOUS at 17:11

## 2021-01-01 RX ADMIN — PANTOPRAZOLE SODIUM 40 MG: 40 TABLET, DELAYED RELEASE ORAL at 08:30

## 2021-01-01 RX ADMIN — FAMOTIDINE 20 MG: 20 TABLET, FILM COATED ORAL at 08:48

## 2021-01-01 RX ADMIN — MORPHINE SULFATE 2 MG: 2 INJECTION, SOLUTION INTRAMUSCULAR; INTRAVENOUS at 17:04

## 2021-01-01 RX ADMIN — FINASTERIDE 5 MG: 5 TABLET, FILM COATED ORAL at 08:12

## 2021-01-01 RX ADMIN — FINASTERIDE 5 MG: 5 TABLET, FILM COATED ORAL at 14:28

## 2021-01-01 RX ADMIN — HYDRALAZINE HYDROCHLORIDE 10 MG: 20 INJECTION INTRAMUSCULAR; INTRAVENOUS at 19:00

## 2021-01-01 RX ADMIN — OXYCODONE 10 MG: 5 TABLET ORAL at 03:10

## 2021-01-01 RX ADMIN — FENTANYL CITRATE 50 MCG: 50 INJECTION, SOLUTION INTRAMUSCULAR; INTRAVENOUS at 08:34

## 2021-01-01 RX ADMIN — SODIUM CHLORIDE: 9 INJECTION, SOLUTION INTRAVENOUS at 03:07

## 2021-01-01 RX ADMIN — Medication 10 ML: at 09:25

## 2021-01-01 RX ADMIN — ANTACID TABLETS 500 MG: 500 TABLET, CHEWABLE ORAL at 09:54

## 2021-01-01 RX ADMIN — PROPOFOL 180 MG: 10 INJECTION, EMULSION INTRAVENOUS at 08:34

## 2021-01-01 RX ADMIN — MORPHINE SULFATE 2 MG: 2 INJECTION, SOLUTION INTRAMUSCULAR; INTRAVENOUS at 05:26

## 2021-01-01 RX ADMIN — VALSARTAN 80 MG: 80 TABLET, FILM COATED ORAL at 08:27

## 2021-01-01 RX ADMIN — ATORVASTATIN CALCIUM 40 MG: 40 TABLET, FILM COATED ORAL at 20:04

## 2021-01-01 RX ADMIN — PANTOPRAZOLE SODIUM 40 MG: 40 INJECTION, POWDER, FOR SOLUTION INTRAVENOUS at 09:20

## 2021-01-01 RX ADMIN — Medication 10 ML: at 21:13

## 2021-01-01 RX ADMIN — ATORVASTATIN CALCIUM 40 MG: 40 TABLET, FILM COATED ORAL at 22:28

## 2021-01-01 RX ADMIN — METRONIDAZOLE 500 MG: 500 INJECTION, SOLUTION INTRAVENOUS at 22:11

## 2021-01-01 RX ADMIN — VALSARTAN 80 MG: 80 TABLET, FILM COATED ORAL at 14:28

## 2021-01-01 RX ADMIN — Medication 10 ML: at 22:06

## 2021-01-01 RX ADMIN — SODIUM CHLORIDE: 9 INJECTION, SOLUTION INTRAVENOUS at 22:16

## 2021-01-01 RX ADMIN — FINASTERIDE 5 MG: 5 TABLET, FILM COATED ORAL at 09:55

## 2021-01-01 RX ADMIN — SODIUM CHLORIDE, PRESERVATIVE FREE 10 ML: 5 INJECTION INTRAVENOUS at 08:13

## 2021-01-01 RX ADMIN — ONDANSETRON 4 MG: 2 INJECTION INTRAMUSCULAR; INTRAVENOUS at 12:46

## 2021-01-01 RX ADMIN — PROPOFOL 60 MG: 10 INJECTION, EMULSION INTRAVENOUS at 14:36

## 2021-01-01 RX ADMIN — OXYCODONE 5 MG: 5 TABLET ORAL at 08:46

## 2021-01-01 RX ADMIN — ROCURONIUM BROMIDE 30 MG: 10 INJECTION, SOLUTION INTRAVENOUS at 08:34

## 2021-01-01 RX ADMIN — ONDANSETRON 4 MG: 2 INJECTION INTRAMUSCULAR; INTRAVENOUS at 08:26

## 2021-01-01 RX ADMIN — POLYETHYLENE GLYCOL 3350 17 G: 17 POWDER, FOR SOLUTION ORAL at 13:58

## 2021-01-01 RX ADMIN — PANTOPRAZOLE SODIUM 40 MG: 40 INJECTION, POWDER, FOR SOLUTION INTRAVENOUS at 20:02

## 2021-01-01 RX ADMIN — GLYCOPYRROLATE 0.1 MG: 0.2 INJECTION, SOLUTION INTRAMUSCULAR; INTRAVENOUS at 14:36

## 2021-01-01 RX ADMIN — OXYCODONE AND ACETAMINOPHEN 1 TABLET: 10; 325 TABLET ORAL at 20:40

## 2021-01-01 RX ADMIN — HYDROMORPHONE HYDROCHLORIDE 1 MG: 1 INJECTION, SOLUTION INTRAMUSCULAR; INTRAVENOUS; SUBCUTANEOUS at 21:40

## 2021-01-01 RX ADMIN — OXYCODONE 10 MG: 5 TABLET ORAL at 00:28

## 2021-01-01 RX ADMIN — OXYCODONE 10 MG: 5 TABLET ORAL at 12:53

## 2021-01-01 RX ADMIN — SUGAMMADEX 180 MG: 100 INJECTION, SOLUTION INTRAVENOUS at 09:10

## 2021-01-01 RX ADMIN — ONDANSETRON 4 MG: 2 INJECTION INTRAMUSCULAR; INTRAVENOUS at 01:29

## 2021-01-01 RX ADMIN — POLYETHYLENE GLYCOL 3350 17 G: 17 POWDER, FOR SOLUTION ORAL at 11:10

## 2021-01-01 RX ADMIN — ONDANSETRON 4 MG: 2 INJECTION INTRAMUSCULAR; INTRAVENOUS at 08:54

## 2021-01-01 RX ADMIN — METRONIDAZOLE 500 MG: 500 INJECTION, SOLUTION INTRAVENOUS at 16:03

## 2021-01-01 RX ADMIN — ATORVASTATIN CALCIUM 40 MG: 40 TABLET, FILM COATED ORAL at 20:27

## 2021-01-01 RX ADMIN — SODIUM CHLORIDE: 9 INJECTION, SOLUTION INTRAVENOUS at 15:00

## 2021-01-01 RX ADMIN — Medication 10 ML: at 20:12

## 2021-01-01 RX ADMIN — TAMSULOSIN HYDROCHLORIDE 0.4 MG: 0.4 CAPSULE ORAL at 10:04

## 2021-01-01 RX ADMIN — NALOXEGOL OXALATE 25 MG: 25 TABLET, FILM COATED ORAL at 09:24

## 2021-01-01 RX ADMIN — SODIUM CHLORIDE: 9 INJECTION, SOLUTION INTRAVENOUS at 14:22

## 2021-01-01 RX ADMIN — PANTOPRAZOLE SODIUM 40 MG: 40 INJECTION, POWDER, FOR SOLUTION INTRAVENOUS at 09:28

## 2021-01-01 RX ADMIN — METRONIDAZOLE 500 MG: 500 INJECTION, SOLUTION INTRAVENOUS at 22:23

## 2021-01-01 RX ADMIN — SODIUM CHLORIDE 1000 ML: 9 INJECTION, SOLUTION INTRAVENOUS at 12:45

## 2021-01-01 RX ADMIN — BISACODYL 5 MG: 5 TABLET, COATED ORAL at 11:57

## 2021-01-01 RX ADMIN — POTASSIUM BICARBONATE 50 MEQ: 978 TABLET, EFFERVESCENT ORAL at 13:03

## 2021-01-01 RX ADMIN — CEFEPIME HYDROCHLORIDE 2000 MG: 2 INJECTION, POWDER, FOR SOLUTION INTRAVENOUS at 18:47

## 2021-01-01 RX ADMIN — METRONIDAZOLE 500 MG: 500 INJECTION, SOLUTION INTRAVENOUS at 06:44

## 2021-01-01 RX ADMIN — SODIUM CHLORIDE, PRESERVATIVE FREE 10 ML: 5 INJECTION INTRAVENOUS at 10:04

## 2021-01-01 RX ADMIN — SODIUM CHLORIDE: 9 INJECTION, SOLUTION INTRAVENOUS at 20:07

## 2021-01-01 RX ADMIN — LABETALOL HYDROCHLORIDE 20 MG: 5 INJECTION INTRAVENOUS at 17:21

## 2021-01-01 RX ADMIN — CEFAZOLIN 2000 MG: 1 INJECTION, POWDER, FOR SOLUTION INTRAVENOUS at 13:25

## 2021-01-01 RX ADMIN — TAMSULOSIN HYDROCHLORIDE 0.4 MG: 0.4 CAPSULE ORAL at 20:02

## 2021-01-01 RX ADMIN — BISACODYL 5 MG: 5 TABLET, COATED ORAL at 08:29

## 2021-01-01 RX ADMIN — CEFTRIAXONE 2000 MG: 2 INJECTION, POWDER, FOR SOLUTION INTRAMUSCULAR; INTRAVENOUS at 18:06

## 2021-01-01 RX ADMIN — METRONIDAZOLE 500 MG: 500 INJECTION, SOLUTION INTRAVENOUS at 21:13

## 2021-01-01 RX ADMIN — ASPIRIN 81 MG: 81 TABLET, COATED ORAL at 08:13

## 2021-01-01 RX ADMIN — Medication 10 ML: at 08:25

## 2021-01-01 RX ADMIN — Medication 10 ML: at 21:17

## 2021-01-01 RX ADMIN — VALSARTAN 80 MG: 80 TABLET, FILM COATED ORAL at 08:46

## 2021-01-01 RX ADMIN — ATORVASTATIN CALCIUM 40 MG: 40 TABLET, FILM COATED ORAL at 20:14

## 2021-01-01 RX ADMIN — BUPIVACAINE HYDROCHLORIDE 10 MG: 5 INJECTION, SOLUTION EPIDURAL; INTRACAUDAL at 14:35

## 2021-01-01 RX ADMIN — ATORVASTATIN CALCIUM 40 MG: 40 TABLET, FILM COATED ORAL at 21:02

## 2021-01-01 RX ADMIN — METRONIDAZOLE 500 MG: 500 INJECTION, SOLUTION INTRAVENOUS at 06:10

## 2021-01-01 RX ADMIN — CEFEPIME HYDROCHLORIDE 1000 MG: 1 INJECTION, POWDER, FOR SOLUTION INTRAMUSCULAR; INTRAVENOUS at 17:16

## 2021-01-01 SDOH — ECONOMIC STABILITY: FOOD INSECURITY: WITHIN THE PAST 12 MONTHS, THE FOOD YOU BOUGHT JUST DIDN'T LAST AND YOU DIDN'T HAVE MONEY TO GET MORE.: NEVER TRUE

## 2021-01-01 SDOH — ECONOMIC STABILITY: FOOD INSECURITY: WITHIN THE PAST 12 MONTHS, YOU WORRIED THAT YOUR FOOD WOULD RUN OUT BEFORE YOU GOT MONEY TO BUY MORE.: NEVER TRUE

## 2021-01-01 ASSESSMENT — PAIN DESCRIPTION - LOCATION
LOCATION: BACK
LOCATION: BACK
LOCATION: SHOULDER
LOCATION: BACK
LOCATION: SHOULDER
LOCATION: BACK
LOCATION: CHEST;BACK
LOCATION: BACK
LOCATION: SHOULDER
LOCATION: BACK
LOCATION: BACK
LOCATION: SHOULDER
LOCATION: BACK
LOCATION: SHOULDER
LOCATION: BACK
LOCATION: SHOULDER
LOCATION: BACK
LOCATION: CHEST;BACK
LOCATION: BACK
LOCATION_2: CHEST
LOCATION: BACK

## 2021-01-01 ASSESSMENT — PAIN SCALES - GENERAL
PAINLEVEL_OUTOF10: 8
PAINLEVEL_OUTOF10: 7
PAINLEVEL_OUTOF10: 0
PAINLEVEL_OUTOF10: 5
PAINLEVEL_OUTOF10: 0
PAINLEVEL_OUTOF10: 7
PAINLEVEL_OUTOF10: 8
PAINLEVEL_OUTOF10: 0
PAINLEVEL_OUTOF10: 5
PAINLEVEL_OUTOF10: 6
PAINLEVEL_OUTOF10: 0
PAINLEVEL_OUTOF10: 0
PAINLEVEL_OUTOF10: 6
PAINLEVEL_OUTOF10: 0
PAINLEVEL_OUTOF10: 2
PAINLEVEL_OUTOF10: 5
PAINLEVEL_OUTOF10: 7
PAINLEVEL_OUTOF10: 10
PAINLEVEL_OUTOF10: 7
PAINLEVEL_OUTOF10: 1
PAINLEVEL_OUTOF10: 8
PAINLEVEL_OUTOF10: 0
PAINLEVEL_OUTOF10: 7
PAINLEVEL_OUTOF10: 8
PAINLEVEL_OUTOF10: 10
PAINLEVEL_OUTOF10: 6
PAINLEVEL_OUTOF10: 8
PAINLEVEL_OUTOF10: 7
PAINLEVEL_OUTOF10: 8
PAINLEVEL_OUTOF10: 7
PAINLEVEL_OUTOF10: 0
PAINLEVEL_OUTOF10: 0
PAINLEVEL_OUTOF10: 6
PAINLEVEL_OUTOF10: 3
PAINLEVEL_OUTOF10: 1
PAINLEVEL_OUTOF10: 7
PAINLEVEL_OUTOF10: 0
PAINLEVEL_OUTOF10: 7
PAINLEVEL_OUTOF10: 10
PAINLEVEL_OUTOF10: 8
PAINLEVEL_OUTOF10: 6
PAINLEVEL_OUTOF10: 3
PAINLEVEL_OUTOF10: 6
PAINLEVEL_OUTOF10: 6
PAINLEVEL_OUTOF10: 4
PAINLEVEL_OUTOF10: 8
PAINLEVEL_OUTOF10: 4
PAINLEVEL_OUTOF10: 10
PAINLEVEL_OUTOF10: 8
PAINLEVEL_OUTOF10: 0
PAINLEVEL_OUTOF10: 9
PAINLEVEL_OUTOF10: 2
PAINLEVEL_OUTOF10: 8
PAINLEVEL_OUTOF10: 9
PAINLEVEL_OUTOF10: 0
PAINLEVEL_OUTOF10: 0
PAINLEVEL_OUTOF10: 5
PAINLEVEL_OUTOF10: 6
PAINLEVEL_OUTOF10: 0
PAINLEVEL_OUTOF10: 8
PAINLEVEL_OUTOF10: 4
PAINLEVEL_OUTOF10: 8
PAINLEVEL_OUTOF10: 6
PAINLEVEL_OUTOF10: 9
PAINLEVEL_OUTOF10: 2
PAINLEVEL_OUTOF10: 7
PAINLEVEL_OUTOF10: 8
PAINLEVEL_OUTOF10: 6
PAINLEVEL_OUTOF10: 8
PAINLEVEL_OUTOF10: 7
PAINLEVEL_OUTOF10: 8
PAINLEVEL_OUTOF10: 7
PAINLEVEL_OUTOF10: 1
PAINLEVEL_OUTOF10: 6
PAINLEVEL_OUTOF10: 7
PAINLEVEL_OUTOF10: 10
PAINLEVEL_OUTOF10: 7
PAINLEVEL_OUTOF10: 2
PAINLEVEL_OUTOF10: 0
PAINLEVEL_OUTOF10: 4
PAINLEVEL_OUTOF10: 1
PAINLEVEL_OUTOF10: 9
PAINLEVEL_OUTOF10: 0
PAINLEVEL_OUTOF10: 0
PAINLEVEL_OUTOF10: 5
PAINLEVEL_OUTOF10: 0
PAINLEVEL_OUTOF10: 6
PAINLEVEL_OUTOF10: 8
PAINLEVEL_OUTOF10: 0
PAINLEVEL_OUTOF10: 4
PAINLEVEL_OUTOF10: 7

## 2021-01-01 ASSESSMENT — ENCOUNTER SYMPTOMS
RHINORRHEA: 0
TROUBLE SWALLOWING: 0
BLOOD IN STOOL: 0
COUGH: 0
COUGH: 0
BACK PAIN: 1
TROUBLE SWALLOWING: 0
DIARRHEA: 0
SORE THROAT: 0
SHORTNESS OF BREATH: 0
EYE DISCHARGE: 0
EYE DISCHARGE: 0
ABDOMINAL PAIN: 0
SHORTNESS OF BREATH: 0
COUGH: 1
WHEEZING: 0
SORE THROAT: 0
BLOOD IN STOOL: 0
BACK PAIN: 1
ABDOMINAL PAIN: 0
EYE PAIN: 0
NAUSEA: 0
TROUBLE SWALLOWING: 0
TROUBLE SWALLOWING: 0
DIARRHEA: 0
COUGH: 0
DIARRHEA: 0
VOMITING: 0
WHEEZING: 0
SHORTNESS OF BREATH: 0
TROUBLE SWALLOWING: 0
WHEEZING: 0
TROUBLE SWALLOWING: 0
NAUSEA: 0
CONSTIPATION: 0
BACK PAIN: 1
SORE THROAT: 0
EYE DISCHARGE: 0
RHINORRHEA: 0
ABDOMINAL PAIN: 0
VOMITING: 0
ABDOMINAL PAIN: 0
NAUSEA: 0
SHORTNESS OF BREATH: 0
EYE DISCHARGE: 0
EYE REDNESS: 0
TROUBLE SWALLOWING: 0
WHEEZING: 0
ABDOMINAL PAIN: 0
EYE REDNESS: 0
SHORTNESS OF BREATH: 0
NAUSEA: 0
BACK PAIN: 1
NAUSEA: 0
EYE REDNESS: 0
ABDOMINAL PAIN: 0
SORE THROAT: 0
NAUSEA: 1
RHINORRHEA: 0
FACIAL SWELLING: 0
RHINORRHEA: 0
BLOOD IN STOOL: 0
SHORTNESS OF BREATH: 0
DIARRHEA: 0
EYE REDNESS: 0
CHEST TIGHTNESS: 0
SHORTNESS OF BREATH: 0
CONSTIPATION: 0
COUGH: 0
SHORTNESS OF BREATH: 0
SORE THROAT: 0
BACK PAIN: 1
BACK PAIN: 1
COUGH: 0
EYE REDNESS: 0
ABDOMINAL PAIN: 0
COUGH: 0
RHINORRHEA: 0
CONSTIPATION: 0
COUGH: 0
SORE THROAT: 0
BACK PAIN: 0
SHORTNESS OF BREATH: 0
COUGH: 0
VOMITING: 0
DIARRHEA: 0
NAUSEA: 0
NAUSEA: 1
BACK PAIN: 0
SHORTNESS OF BREATH: 0
CONSTIPATION: 0
TROUBLE SWALLOWING: 0
CONSTIPATION: 0
ABDOMINAL PAIN: 0
COUGH: 0
SORE THROAT: 0
RHINORRHEA: 0
DIARRHEA: 0
WHEEZING: 0
VOMITING: 0
NAUSEA: 0
EYE REDNESS: 0
SHORTNESS OF BREATH: 0
SORE THROAT: 0
WHEEZING: 0
SHORTNESS OF BREATH: 0
EYE REDNESS: 0
BLOOD IN STOOL: 0
CONSTIPATION: 0
ABDOMINAL PAIN: 0
ABDOMINAL PAIN: 0
CONSTIPATION: 0
SHORTNESS OF BREATH: 0
SINUS PRESSURE: 1
NAUSEA: 0
ABDOMINAL PAIN: 0
WHEEZING: 0
EYE DISCHARGE: 0
EYE DISCHARGE: 0
SHORTNESS OF BREATH: 0
BACK PAIN: 1
ABDOMINAL PAIN: 0
WHEEZING: 0
COUGH: 0
SORE THROAT: 0
SORE THROAT: 0
RHINORRHEA: 0
SORE THROAT: 0
EYE DISCHARGE: 0
CONSTIPATION: 0
SINUS PAIN: 1
NAUSEA: 0
BACK PAIN: 1
NAUSEA: 0
DIARRHEA: 0
DIARRHEA: 0
BACK PAIN: 1
COUGH: 0
SORE THROAT: 0
VOMITING: 0
EYE REDNESS: 0
ABDOMINAL PAIN: 0
NAUSEA: 0
EYE DISCHARGE: 0
BLOOD IN STOOL: 0
RHINORRHEA: 0
COUGH: 0
SORE THROAT: 0

## 2021-01-01 ASSESSMENT — PAIN DESCRIPTION - ORIENTATION
ORIENTATION: MID
ORIENTATION: MID
ORIENTATION: LEFT
ORIENTATION: MID;LEFT
ORIENTATION: LEFT
ORIENTATION: MID
ORIENTATION: UPPER
ORIENTATION: LEFT
ORIENTATION: UPPER
ORIENTATION: LEFT
ORIENTATION: LEFT
ORIENTATION: UPPER
ORIENTATION: MID
ORIENTATION_2: MID

## 2021-01-01 ASSESSMENT — PAIN DESCRIPTION - DESCRIPTORS
DESCRIPTORS: SHARP
DESCRIPTORS: ACHING
DESCRIPTORS: SHARP
DESCRIPTORS: THROBBING
DESCRIPTORS: SHARP
DESCRIPTORS: THROBBING
DESCRIPTORS: THROBBING
DESCRIPTORS: SHARP
DESCRIPTORS: SHARP
DESCRIPTORS: DISCOMFORT
DESCRIPTORS: SHARP
DESCRIPTORS: SHARP
DESCRIPTORS: DISCOMFORT
DESCRIPTORS: SHARP

## 2021-01-01 ASSESSMENT — PAIN DESCRIPTION - PAIN TYPE
TYPE: CHRONIC PAIN
TYPE: ACUTE PAIN
TYPE_2: ACUTE PAIN
TYPE: ACUTE PAIN
TYPE: ACUTE PAIN
TYPE: CHRONIC PAIN
TYPE: CHRONIC PAIN
TYPE: ACUTE PAIN
TYPE: SURGICAL PAIN
TYPE: CHRONIC PAIN
TYPE: ACUTE PAIN
TYPE: SURGICAL PAIN
TYPE: ACUTE PAIN
TYPE: SURGICAL PAIN
TYPE: ACUTE PAIN
TYPE: SURGICAL PAIN
TYPE: CHRONIC PAIN
TYPE: ACUTE PAIN
TYPE: ACUTE PAIN

## 2021-01-01 ASSESSMENT — PULMONARY FUNCTION TESTS
PIF_VALUE: 15
PIF_VALUE: 1
PIF_VALUE: 28
PIF_VALUE: 24
PIF_VALUE: 1
PIF_VALUE: 15
PIF_VALUE: 1
PIF_VALUE: 1
PIF_VALUE: 15
PIF_VALUE: 1
PIF_VALUE: 15
PIF_VALUE: 1
PIF_VALUE: 15
PIF_VALUE: 1
PIF_VALUE: 15
PIF_VALUE: 1
PIF_VALUE: 2
PIF_VALUE: 1
PIF_VALUE: 15
PIF_VALUE: 14
PIF_VALUE: 1
PIF_VALUE: 15
PIF_VALUE: 1
PIF_VALUE: 15
PIF_VALUE: 1
PIF_VALUE: 15
PIF_VALUE: 1
PIF_VALUE: 15
PIF_VALUE: 1
PIF_VALUE: 15
PIF_VALUE: 3
PIF_VALUE: 15
PIF_VALUE: 1
PIF_VALUE: 1
PIF_VALUE: 15
PIF_VALUE: 1
PIF_VALUE: 15
PIF_VALUE: 14
PIF_VALUE: 15

## 2021-01-01 ASSESSMENT — PATIENT HEALTH QUESTIONNAIRE - PHQ9
5. POOR APPETITE OR OVEREATING: 2
9. THOUGHTS THAT YOU WOULD BE BETTER OFF DEAD, OR OF HURTING YOURSELF: 2
8. MOVING OR SPEAKING SO SLOWLY THAT OTHER PEOPLE COULD HAVE NOTICED. OR THE OPPOSITE, BEING SO FIGETY OR RESTLESS THAT YOU HAVE BEEN MOVING AROUND A LOT MORE THAN USUAL: 2
7. TROUBLE CONCENTRATING ON THINGS, SUCH AS READING THE NEWSPAPER OR WATCHING TELEVISION: 2
SUM OF ALL RESPONSES TO PHQ9 QUESTIONS 1 & 2: 4
2. FEELING DOWN, DEPRESSED OR HOPELESS: 0
6. FEELING BAD ABOUT YOURSELF - OR THAT YOU ARE A FAILURE OR HAVE LET YOURSELF OR YOUR FAMILY DOWN: 1
SUM OF ALL RESPONSES TO PHQ QUESTIONS 1-9: 0
1. LITTLE INTEREST OR PLEASURE IN DOING THINGS: 2
SUM OF ALL RESPONSES TO PHQ QUESTIONS 1-9: 17
3. TROUBLE FALLING OR STAYING ASLEEP: 2
2. FEELING DOWN, DEPRESSED OR HOPELESS: 2
4. FEELING TIRED OR HAVING LITTLE ENERGY: 2
SUM OF ALL RESPONSES TO PHQ9 QUESTIONS 1 & 2: 0
10. IF YOU CHECKED OFF ANY PROBLEMS, HOW DIFFICULT HAVE THESE PROBLEMS MADE IT FOR YOU TO DO YOUR WORK, TAKE CARE OF THINGS AT HOME, OR GET ALONG WITH OTHER PEOPLE: 2
SUM OF ALL RESPONSES TO PHQ QUESTIONS 1-9: 17
SUM OF ALL RESPONSES TO PHQ QUESTIONS 1-9: 15

## 2021-01-01 ASSESSMENT — PAIN - FUNCTIONAL ASSESSMENT
PAIN_FUNCTIONAL_ASSESSMENT: PREVENTS OR INTERFERES SOME ACTIVE ACTIVITIES AND ADLS
PAIN_FUNCTIONAL_ASSESSMENT: PREVENTS OR INTERFERES WITH MANY ACTIVE NOT PASSIVE ACTIVITIES
PAIN_FUNCTIONAL_ASSESSMENT: 0-10
PAIN_FUNCTIONAL_ASSESSMENT: PREVENTS OR INTERFERES SOME ACTIVE ACTIVITIES AND ADLS
PAIN_FUNCTIONAL_ASSESSMENT: PREVENTS OR INTERFERES SOME ACTIVE ACTIVITIES AND ADLS
PAIN_FUNCTIONAL_ASSESSMENT: ACTIVITIES ARE NOT PREVENTED

## 2021-01-01 ASSESSMENT — PAIN DESCRIPTION - DIRECTION
RADIATING_TOWARDS: BACK
RADIATING_TOWARDS: BACK

## 2021-01-01 ASSESSMENT — PAIN DESCRIPTION - FREQUENCY
FREQUENCY: CONTINUOUS
FREQUENCY: INTERMITTENT
FREQUENCY: CONTINUOUS
FREQUENCY: INTERMITTENT
FREQUENCY: CONTINUOUS
FREQUENCY: INTERMITTENT

## 2021-01-01 ASSESSMENT — PAIN DESCRIPTION - PROGRESSION
CLINICAL_PROGRESSION: NOT CHANGED
CLINICAL_PROGRESSION: GRADUALLY WORSENING

## 2021-01-01 ASSESSMENT — PAIN DESCRIPTION - ONSET
ONSET: AWAKENED FROM SLEEP
ONSET: ON-GOING

## 2021-01-01 ASSESSMENT — COLUMBIA-SUICIDE SEVERITY RATING SCALE - C-SSRS
2. HAVE YOU ACTUALLY HAD ANY THOUGHTS OF KILLING YOURSELF?: NO
1. WITHIN THE PAST MONTH, HAVE YOU WISHED YOU WERE DEAD OR WISHED YOU COULD GO TO SLEEP AND NOT WAKE UP?: NO
6. HAVE YOU EVER DONE ANYTHING, STARTED TO DO ANYTHING, OR PREPARED TO DO ANYTHING TO END YOUR LIFE?: NO

## 2021-01-01 ASSESSMENT — PAIN DESCRIPTION - INTENSITY
RATING_2: 0
RATING_2: 5

## 2021-01-01 ASSESSMENT — SOCIAL DETERMINANTS OF HEALTH (SDOH): HOW HARD IS IT FOR YOU TO PAY FOR THE VERY BASICS LIKE FOOD, HOUSING, MEDICAL CARE, AND HEATING?: NOT HARD AT ALL

## 2021-01-05 NOTE — TELEPHONE ENCOUNTER
Dr Suzanne Weeks called pt is refusing to have radiation would like dr Stormy Mohr to talk with the pt and let him know what the next step is.

## 2021-01-12 NOTE — PROGRESS NOTES
Patient is here for follow-up evaluation following surgery for what appeared now to be squamous cell carcinoma in the nasal area on the right side. He has seen radiotherapist but decided against radiotherapeutic treatment and also chemotherapy. He continues to improve with better airway on the right but has noticed some decrease in airway on the left. He is using sinus washes. He has previously used an antibiotic which seemed to improve the situation quite nicely. He has no fever and does not smoke. Currently, examination reveals deviation of the septum to the left which corresponds to the probable problem of decreased airway on the left side. No lesions are noted otherwise. On the right, there is dryness and crusting consistent with his recovery from surgery. I see no evidence of recurrent disease at this time. I had a long discussion with him relative to further follow-up treatment including radiation. I have said that I would prefer that he do this but due to his concerns relative to possible complications, we can follow him very closely and determine whether any recurrence occurs and then treated promptly. I did suggest that he continue the use of his sinus wash and will add guaifenesin to his regimen. We will also try Flonase nasal spray once a day on each side to help breathing further. I will see him again in 1 month.

## 2021-02-16 NOTE — PROGRESS NOTES
Patient continues to have occasional bleeding from the right side of his nose and has trouble breathing through it. He is still adamant about not going ahead with radiation therapy. Examination does reveal obstruction on the right side with an area of bleeding inferiorly. I do believe that this may be recurrent disease. In order to give him a better airway and also to expand upon the need for radiation therapy, I will have him at the hospital and do this under anesthesia to clean the area out. He is understanding of this and in the meantime will be using Bactroban ointment and occasional use of Afrin on a cottonball if it bleeds.

## 2021-03-22 NOTE — TELEPHONE ENCOUNTER
Patient's wife calling, Nina Nava is having  Surgery April 12,      Dr Cramer Memos requesting patient see his PCP,    Patient has an appt on March 29,     And his Cardio gist for clear ence     Wife says that he cant get in to see his cardiologist until April 26,    Please advise    They are saying that they made not need to see patient,  Because he was just there 6 mos ago, because he had a surgery last oct ,  They requested our fax #      Please advise

## 2021-03-22 NOTE — TELEPHONE ENCOUNTER
Spoke with Mary from [employer]. Wondering if patient should stop any of his medications before surgery? Specifically the Atorvastain (Lipitor) or Valsartan (Diovan). Please advise.

## 2021-03-22 NOTE — TELEPHONE ENCOUNTER
Called patient and told his wife that he can take his meds, even his BP med morning of surgery with small sip of water

## 2021-03-22 NOTE — TELEPHONE ENCOUNTER
Mary calling from [facility],  She is asking what type of surgery the patient is having , I am transferring her to darin

## 2021-03-29 NOTE — PROGRESS NOTES
Chief Complaint   Patient presents with    Pre-op Exam     on april 12 he is having nausal surgery,          Shonda Quinn is a 67 y.o. male who presents for preoperative exam for repeat sinus surgery secondary to recurrent epistaxis . 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 Pt was referred to Oncology who recommended radiation therapy but pt declined      Patient has a past medical history significant for dyslipidemia, HTN and CAD s/p MI with stent placement 2017 and is followed by Cardiology and is on lipitor and diovan medication.      Pt denies any hx of asthma or strokes     FHx unknown     Pt is an exsmoker quit 2001 and denies alcohol use         Neurology note 10/28/2020  IMPRESSION :  Asymptomatic right vertebral occlusion  MR angiography of the head also shows atherosclerotic changes in the M2 segment of the right middle cerebral artery. Patient has severe nosebleeds and CT sinuses showed an expansile soft tissue mass in the right nasal cavity. RECOMMENDATIONS :  Discussed with patient and his family. Reviewed MRA  reports. These are incidental findings and do not need any further work-up at this time. There is no contraindication from a neurological standpoint for any necessary ENT surgery that is being planned. I would recommend the patient start antiplatelet therapy with low-dose aspirin after the surgery and after his nasal bleeding has resolved. I would also recommend continued aggressive management of hypertension and hyperlipidemia. I will see him on a as needed basis. Thank you for this consultation. ENT note 01/12/2021  Patient is here for follow-up evaluation following surgery for what appeared now to be squamous cell carcinoma in the nasal area on the right side.   He has seen radiotherapist but decided against radiotherapeutic treatment and also chemotherapy. He continues to improve with better airway on the right but has noticed some decrease in airway on the left. He is using sinus washes. He has previously used an antibiotic which seemed to improve the situation quite nicely. He has no fever and does not smoke. Currently, examination reveals deviation of the septum to the left which corresponds to the probable problem of decreased airway on the left side. No lesions are noted otherwise. On the right, there is dryness and crusting consistent with his recovery from surgery. I see no evidence of recurrent disease at this time. I had a long discussion with him relative to further follow-up treatment including radiation. I have said that I would prefer that he do this but due to his concerns relative to possible complications, we can follow him very closely and determine whether any recurrence occurs and then treated promptly. I did suggest that he continue the use of his sinus wash and will add guaifenesin to his regimen. We will also try Flonase nasal spray once a day on each side to help breathing further. I will see him again in 1 month. ENT note 02/16/2021   Patient continues to have occasional bleeding from the right side of his nose and has trouble breathing through it. He is still adamant about not going ahead with radiation therapy. Examination does reveal obstruction on the right side with an area of bleeding inferiorly. I do believe that this may be recurrent disease. In order to give him a better airway and also to expand upon the need for radiation therapy, I will have him at the hospital and do this under anesthesia to clean the area out. He is understanding of this and in the meantime will be using Bactroban ointment and occasional use of Afrin on a cottonball if it bleeds. Review of Systems   Constitutional: Negative for fatigue and fever.    HENT: Positive for nosebleeds (chronic R nare and uses Afrin spray as needed). Negative for sore throat. Eyes:        Negative blurred vision or diplopia   Respiratory: Negative for cough and shortness of breath. Cardiovascular: Negative for chest pain, palpitations and leg swelling. Gastrointestinal: Negative for abdominal pain, blood in stool, nausea and vomiting. Negative melena or indigestion   Endocrine: Negative for polydipsia and polyuria. Genitourinary: Negative for dysuria and hematuria. Musculoskeletal: Negative for arthralgias. Skin: Negative for rash. Neurological: Negative for dizziness, seizures, syncope, speech difficulty, weakness and headaches. Psychiatric/Behavioral: Negative for dysphoric mood. The patient is not nervous/anxious. Vitals:    03/29/21 1123   BP:  144/86   Pulse:  70   Temp:    SpO2:      Pt did not take his BP medication yet today    Physical Exam  Vitals signs reviewed. Constitutional:       General: He is not in acute distress. HENT:      Nose:      Comments: Gauze packing in place in R nostril     Mouth/Throat:      Mouth: Mucous membranes are moist.      Pharynx: Oropharynx is clear. Eyes:      General: No scleral icterus. Pupils: Pupils are equal, round, and reactive to light. Comments: Pink conjunctivae    Neck:      Thyroid: No thyromegaly. Comments: No carotid bruits noted B/L  Cardiovascular:      Heart sounds: Murmur (2/6 systolic) present. No friction rub. No gallop. Pulmonary:      Effort: Pulmonary effort is normal.      Breath sounds: Normal breath sounds. Abdominal:      Palpations: Abdomen is soft. Tenderness: There is no abdominal tenderness. Musculoskeletal:      Comments: No leg edema noted B/L   Lymphadenopathy:      Cervical: No cervical adenopathy. Skin:     Findings: No rash. Neurological:      Mental Status: He is alert.       Comments: Cranial nerves 2 - 12 grossly intact; Muscle strength 5/5 throughout   Psychiatric:         Mood and Affect: Mood normal.         ASSESSMENT AND PLAN    1. Preop examination  Pt is with an unremarkable exam and stable VS and was recently cleared by Cardiology and Neurology for surgery and will check basic bloodwork  - Comprehensive Metabolic Panel; Future  - CBC Auto Differential; Future    2. Cancer of sinus ([address]    3. Coronary artery disease/Essential hypertension   Pt is managed by Cardiology and is clinically stable on present medications and denies any CP or SOB and will check fasting bloodwork  - Comprehensive Metabolic Panel; Future  - CBC Auto Differential; Future  - Lipid Panel; Future  - TSH with Reflex; Future      Bloodwork was reviewed and was within normal limits except for an mildly elevated glucose 129 and patient thus needs to follow a diabetic diet; avoiding concentrated sweets and consuming a low carbohydrate diet.  Patient also is with a low HDL \"good\" cholesterol 31 and thus needs to perform aerobic exercise for at least 30 min 5 times per week    ---Patient is at LOW risk of perioperative cardiac morbidity for scheduled procedure        Genny Lam MD      Return in about 2 months (around 5/29/2021).

## 2021-04-07 NOTE — PROGRESS NOTES
Name_______________________________________Printed:____________________  Date and time of surgery__4/12   0830______________________Arrival Time:__0700______________   1. The instructions given regarding when and if a patient needs to stop oral intake prior to surgery varies. Follow the specific instructions you were given                  _x__Nothing to eat or to drink after Midnight the night before.                   ____Carbo loading or ERAS instructions will be given to select patients-if you have been given those instructions -please do the following                           The evening before your surgery after dinner before midnight drink 40 ounces of gatorade. If you are diabetic use sugar free. The morning of surgery drink 40 ounces of water. This needs to be finished 3 hours prior to your surgery start time. 2. Take the following pills with a small sip of water on the morning of surgery__none________________________________________________                  Do not take blood pressure medications ending in pril or sartan the nixon prior to surgery or the morning of surgery_   3. Aspirin, Ibuprofen, Advil, Naproxen, Vitamin E and other Anti-inflammatory products and supplements should be stopped for 5 -7days before surgery or as directed by your physician. 4. Check with your Doctor regarding stopping Plavix, Coumadin,Eliquis, Lovenox,Effient,Pradaxa,Xarelto, Fragmin or other blood thinners and follow their instructions. 5. Do not smoke, and do not drink any alcoholic beverages 24 hours prior to surgery. This includes NA Beer. Refrain from the usage of any recreational drugs. 6. You may brush your teeth and gargle the morning of surgery. DO NOT SWALLOW WATER   7. You MUST make arrangements for a responsible adult to stay on site while you are here and take you home after your surgery. You will not be allowed to leave alone or drive yourself home.   It is strongly suggested someone stay with you the first 24 hrs. Your surgery will be cancelled if you do not have a ride home. 8. A parent/legal guardian must accompany a child scheduled for surgery and plan to stay at the hospital until the child is discharged. Please do not bring other children with you. 9. Please wear simple, loose fitting clothing to the hospital.  Quinten Lea not bring valuables (money, credit cards, checkbooks, etc.) Do not wear any makeup (including no eye makeup) or nail polish on your fingers or toes. 10. DO NOT wear any jewelry or piercings on day of surgery. All body piercing jewelry must be removed. 11. If you have ___dentures, they will be removed before going to the OR; we will provide you a container. If you wear ___contact lenses or ___glasses, they will be removed; please bring a case for them. 12. Please see your family doctor/pediatrician for a history & physical and/or concerning medications. Bring any test results/reports from your physician's office. PCP__________________Phone___________H&P Appt. Date________             13 If you  have a Living Will and Durable Power of  for Healthcare, please bring in a copy. 15. Notify your Surgeon if you develop any illness between now and surgery  time, cough, cold, fever, sore throat, nausea, vomiting, etc.  Please notify your surgeon if you experience dizziness, shortness of breath or blurred vision between now & the time of your surgery             15. DO NOT shave your operative site 96 hours prior to surgery. For face & neck surgery, men may use an electric razor 48 hours prior to surgery. 16. Shower the night before or morning of surgery using an antibacterial soap or as you have been instructed. 17. To provide excellent care visitors will be limited to one in the room at any given time. 18.  Please bring picture ID and insurance card.              19.  Visit our web site for additional information:  [email or web address]              20.During flu season no children under the age of 15 are permitted in the hospital for the safety of all patients. 21. If you take a long acting insulin in the evening only  take half of your usual  dose the night  before your procedure              22. If you use a c-pap please bring DOS if staying overnight,             23.For your convenience [address] has a pharmacy on site to fill your prescriptions. 24. If you use oxygen and have a portable tank please bring it  with you the DOS             25. Bring a complete list of all your medications with name and dose include any supplements. 26. Other__________________________________________   *Please call pre admission testing if you any further questions   [facility]    [facility] [address]  [phone]   [facility]    __ Done-Where _____  __x Scheduled 4/8___ Where mff___   __ Other __________      Rosita Benavides POLICY(subject to change)    There is a one visitor policy at [facility] for all surgeries and endoscopies. Whether the visitor can stay or will be asked to wait in the car will depend on the current policy and if social distancing can be maintained. The policy is subject to change at any time. Please make sure the visitor has a cell phone that is on,charged and able to accept calls, as this may be the way that the staff communicates with them. Pain management is NO VISITOR policyThe patients ride is expected to remain in the car with a cell phone for communication. If the ride is leaving the hospital grounds please make sure they are back in time for pickup. Have the patient inform the staff on arrival what their rides plans are while the patient is in the facility. At the MAIN there is one visitor allowed. Please note that the visitor policy is subject to change.        All above information reviewed with patient in person or by phone. Patient verbalizes understanding. All questions and concerns addressed. Patient/Rep___per phone/pts wife_________________                                                      Patients wife has concerns about the instruction NOT to take Diovan AM of surgery. Reassured her I would discuss with anesthesia and get back with her  Reviewed with DR Yousif Worthington states my take it AM of surgery-wife informed                                                                            PRE OP INSTRUCTIONS

## 2021-04-08 NOTE — PROGRESS NOTES
Amol Ortega received a viral test for COVID-19. They were educated on isolation and quarantine as appropriate. For any symptoms, they were directed to seek care from their PCP, given contact information to establish with a doctor, directed to an urgent care or the emergency room.

## 2021-04-08 NOTE — PATIENT INSTRUCTIONS

## 2021-04-12 NOTE — PROGRESS NOTES
Pt arrived from OR to PACU bay 1. Reported received from [address] [place] [address] staff. Pt arouses to voice. Surgical incisions dressings in place to nose. Pt on 4L simple mask, NSR, and VSS. Will continue to monitor.

## 2021-04-12 NOTE — OP NOTE
[address]                     [facility], [facility]                                OPERATIVE REPORT    PATIENT NAME: Angie Sacks                   :        1945  MED REC NO:   [id number]                          ROOM:  ACCOUNT NO:   [de-identified]                           ADMIT DATE: 2021  PROVIDER:     Sukhdeep Tee MD    DATE OF PROCEDURE:  2021    PREOPERATIVE DIAGNOSIS:  Probable recurrent and residual squamous cell  carcinoma, right nasal passage and maxillary sinus. POSTOPERATIVE DIAGNOSIS:  Probable recurrent and residual squamous cell  carcinoma, right nasal passage and maxillary sinus. OPERATION PERFORMED:  Right-sided functional endoscopic sinus surgery  including removal of tumor from anterior and posterior ethmoids,  sphenoid sinus opening, and maxillary sinus. SURGEON:  Sukhdeep Izaguirre. Malik Tee MD    ANESTHESIA:  General with endotracheal tube. OPERATIVE PROCEDURE:  The adequately premedicated patient was brought to  the operating room and placed in the supine position. Anesthesia was  induced to a satisfactory level with endotracheal tube in position. The  patient was draped in the usual fashion. The patient has a history of  documented squamous cell carcinoma of the right nasal passage obviously  coming also from the maxillary sinus. He was reticent about radiation  therapy and refused therapy until presently when he was noted to have  extensive obstruction of the nasal passage on the right with occasional  bleeding. The nose was injected with 1% Xylocaine with epinephrine 1:100,000 in a  routine manner with 4% solution. Cocaine-dampened pledgets placed. After a 10-minute interval, the procedure was initiated. The packing was removed from the nose. A 0 degree scope was inserted. Mass was identified in mostly the lower half of the nasal passage, which  did have the appearance of previous tumor. This was carefully removed  and it was likewise removed from the anterior and posterior ethmoidal  areas. The area of the frontal sinus did not seem to be involved. There was tissue present within the maxillary sinus on the right side  seen through previous enlargement of his natural ostium. This was  carefully removed and suctioned free. At this point, there was no  evidence of disruption of the walls of the maxillary sinus involving  bony involvement. There was no evidence of involvement of the roof of  the maxillary sinus connecting to the orbital cavity. All of these  areas seemed to be intact. The mass was removed to facilitate an  airway. It did seem to be involving the posterior end of the septum and  perhaps communicating to the opposite side. Afrin pledgets were  inserted and then removed facilitating further removal of tissue. There  was minimal bleeding actually. Estimated blood loss was approximately  25 mL. The septum was deviated towards the left and was partially  obstructing the left side, but no other involvement in the left naris  was apparent. The nose was again suctioned completely. Three Ernesto  Packs impregnated with Polysporin ointment were placed in the airway on  the right side. The strings were brought to the outside of the nose and  trimmed. They were then taped to the nose. A mustache dressing was  applied. The patient was suctioned completely at the end of the  procedure. He tolerated the procedure well and was sent to the recovery  room in good condition.         Constance Garcia MD    D: 04/12/2021 9:29:37       T: 04/12/2021 10:32:44     [id number]  Job#: [id number]     Doc#: [id number]    CC:

## 2021-04-12 NOTE — ANESTHESIA PRE PROCEDURE
(DILAUDID) injection 0.5 mg  0.5 mg Intravenous Q5 Min PRN Radha Ware MD           Allergies: Allergies   Allergen Reactions    Fish Oil Anaphylaxis    Shellfish Allergy Anaphylaxis    Lisinopril      Other reaction(s): Cough       Problem List:    Patient Active Problem List   Diagnosis Code    Subacute pansinusitis J01.40    Nasal polyposis J33.9       Past Medical History:        Diagnosis Date    AAA (abdominal aortic aneurysm) ([address]     CAD (coronary artery disease)     stents 2017    Hyperlipidemia     Hypertension     Nasal bleeding     worse since covid making the mask mandatory       Past Surgical History:        Procedure Laterality Date    CARDIAC SURGERY  2017    stents placed     COLONOSCOPY      SINUS ENDOSCOPY N/A 11/2/2020    FUNCTIONAL ENDOSCOPIC SINUS SURGERY performed by Evette Ruggiero MD at [address]       Social History:    Social History     Tobacco Use    Smoking status: Never Smoker    Smokeless tobacco: Never Used   Substance Use Topics    Alcohol use: Never     Frequency: Never                                Counseling given: Not Answered      Vital Signs (Current):   Vitals:    04/07/21 1120 04/12/21 0706   Pulse:  64   Resp:  16   Temp:  97.5 °F (36.4 °C)   TempSrc:  Temporal   SpO2:  95%   Weight: 196 lb (88.9 kg) 198 lb (89.8 kg)   Height: 5' 8\" (1.727 m) 5' 9\" (1.753 m)                                              BP Readings from Last 3 Encounters:   04/06/21 113/76   03/29/21 (!) 150/78   12/03/20 (!) 166/93       NPO Status: Time of last liquid consumption: 2100                        Time of last solid consumption: 2100                        Date of last liquid consumption: 04/11/21                        Date of last solid food consumption: 04/11/21    BMI:   Wt Readings from Last 3 Encounters:   04/12/21 198 lb (89.8 kg)   03/29/21 199 lb (90.3 kg)   02/16/21 203 lb (92.1 kg)     Body mass index is 29.24 kg/m².     CBC:   Lab Results   Component Value Date    WBC 7.7 03/29/2021    RBC 4.55 03/29/2021    HGB 13.0 03/29/2021    HCT 39.4 03/29/2021    MCV 86.7 03/29/2021    RDW 13.7 03/29/2021     03/29/2021       CMP:   Lab Results   Component Value Date     03/29/2021    K 5.1 03/29/2021     03/29/2021    CO2 26 03/29/2021    BUN 19 03/29/2021    CREATININE 0.9 03/29/2021    CREATININE 0.9 07/02/2020    GFRAA >60 03/29/2021    AGRATIO 2.2 03/29/2021    LABGLOM >60 03/29/2021    GLUCOSE 129 03/29/2021    PROT 6.7 03/29/2021    CALCIUM 9.1 03/29/2021    BILITOT 0.4 03/29/2021    ALKPHOS 87 03/29/2021    AST 26 03/29/2021    ALT 34 03/29/2021       POC Tests: No results for input(s): POCGLU, POCNA, POCK, POCCL, POCBUN, POCHEMO, POCHCT in the last 72 hours. Coags: No results found for: PROTIME, INR, APTT    HCG (If Applicable): No results found for: PREGTESTUR, PREGSERUM, HCG, HCGQUANT     ABGs: No results found for: PHART, PO2ART, APH2TWT, GVR4VSX, BEART, G4GREFWY     Type & Screen (If Applicable):  No results found for: LABABO, LABRH    Drug/Infectious Status (If Applicable):  No results found for: HIV, HEPCAB    COVID-19 Screening (If Applicable):   Lab Results   Component Value Date    COVID19 Not Detected 04/08/2021           Anesthesia Evaluation  Patient summary reviewed no history of anesthetic complications:   Airway: Mallampati: II  TM distance: >3 FB   Neck ROM: full  Mouth opening: > = 3 FB Dental:    (+) upper dentures      Pulmonary:Negative Pulmonary ROS breath sounds clear to auscultation                             Cardiovascular:    (+) hypertension:, CAD:, CABG/stent (stents 2017):, dysrhythmias (PACs):, hyperlipidemia        Rhythm: regular  Rate: normal                 ROS comment: 2017  Summary:  The left ventricular wall motion is normal.  The left atrium is dilated. There is mild concentric left ventricular hypertrophy. The mitral inflow pattern is consistent with Diastolic Dysfunction.   Left ventricular systolic

## 2021-04-12 NOTE — PROGRESS NOTES
Discharge instructions review with patient and wife - Sharon Sofia. All home medications have been reviewed, pt v/u. Discharge instructions signed. Pt discharged via wheelchair. Pt discharged with 2 rx and all belongings. Wife taking stable pt home.

## 2021-04-12 NOTE — ANESTHESIA POSTPROCEDURE EVALUATION
Department of Anesthesiology  Postprocedure Note    Patient: Ramona Torres  MRN: [id number]  YOB: 1945  Date of evaluation: 4/12/2021  Time:  10:16 AM     Procedure Summary     Date: 04/12/21 Room / Location: [facility] OR [address] [place] [address]    Anesthesia Start: 0830 Anesthesia Stop: 5880    Procedure: RIGHT FUNCTIONAL ENDOSCOPIC SINUS SURGERY (60590, 42262, 46381) (Right ) Diagnosis: (RIGHT NASAL OBSTRUCTION  (C30.0; C31.9; J34.89))    Surgeons: Fortino Lynch MD Responsible Provider: Neil Abrams MD    Anesthesia Type: general ASA Status: 3          Anesthesia Type: general    Margaret Phase I: Margaret Score: 10    Margaret Phase II: Margaret Score: 10    Last vitals: Reviewed and per EMR flowsheets.        Anesthesia Post Evaluation    Patient location during evaluation: PACU  Patient participation: complete - patient participated  Level of consciousness: awake  Airway patency: patent  Nausea & Vomiting: no vomiting  Complications: no  Cardiovascular status: hemodynamically stable  Respiratory status: acceptable  Hydration status: euvolemic

## 2021-04-13 NOTE — PROGRESS NOTES
Patient is here following right-sided functional endoscopic sinus surgery for basically what appears to be squamous cell carcinoma of the nasal passage including the right maxillary sinus without bony involvement except for possible involvement of the septum. Pending your results, he will be seeing a radiation therapist.  The packing is removed without incident and no bleeding is encountered. I will see him again in 1 week.

## 2021-04-20 NOTE — PROGRESS NOTES
Patient seems to be doing quite well following his most recent right-sided functional endoscopic sinus surgery removal of a mass in the right nasal passage. Good airway is now present after suctioning and cleaning. The positive findings on the specimen of squamous cell carcinoma is the same as previously and now he is been admonished and needs to go to have his radiation therapy is suggested. He will proceed with this and I will see him again in 2 weeks. In the meantime, he will continue irrigation as directed.

## 2021-05-11 NOTE — PROGRESS NOTES
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 He has had some recent bleeding. Treatment of the area with cotton impregnated with Afrin and lidocaine for an appropriate period of time reveals good healing with no obvious acute bleeding. The area is suction. I have suggested that he continue his sinus wash on a very regular basis. I will see him again in 2 weeks or sooner if he is continuing to have any significant problem.

## 2021-05-25 NOTE — PROGRESS NOTES
Patient is due to start radiation therapy in approximately 1 week. However, he is having recurring epistaxis on the right side which is been something he is dealt with in the past as well. Likely it is related to the tumor. No other discomfort has been noted. Currently, he appears in no obvious acute distress. Ear examination continues to reveal normal-appearing tympanic membranes and ear canals bilaterally. Oral examination is unremarkable. The neck is free of adenopathy, mass, thyroid enlargement. There is some blood in the posterior portion of the nasal cavity on the right side. Nasal mucosa treated with cotton pledgets  impregnated with Afrin and lidocaine placed in middle meatuses against turbinates. Pledgets left in place for five minutes and removed enabling enhanced visualization. The exam revealed positive edema of mucosa. it was negative for erythema indicative of infection. There was not evidence of deviation of the septum which was not significant. There were not polyps present. .There were not other masses present. The turbinates were not enlarged beyond normal.  Bleeding is obviously coming from the tumor itself. Due to the fact that it is persisted despite him using Bactroban ointment and saline gel, a Gelfoam pack is inserted having been impregnated with Polysporin ointment. This is likely to dissolve in 3 days and I have explained to him this. In addition, we will start him on Keflex. He will contact me if symptoms continue but it is likely that the radiation will eventually eradicate the cause of the bleeding.

## 2021-06-01 PROBLEM — I71.40 ABDOMINAL AORTIC ANEURYSM (AAA) WITHOUT RUPTURE: Status: ACTIVE | Noted: 2021-01-01

## 2021-06-01 NOTE — PROGRESS NOTES
Medicare Annual Wellness Visit  Name: Juan Jose Lawson Date: 2021   MRN: [id number] Sex: Male   Age: 67 y.o. 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 Pt is scheduled to start radiation therapy tomorrow 5 days a week for 6 weeks. Pt is currently completing a course of keflex antibiotic Rx by ENT     Patient has a past medical history significant for dyslipidemia, HTN and CAD s/p MI with stent placement  and is followed by Cardiology and is on lipitor and diovan medication.     Pt has a hx of AAA; Pt has never had a cardiac stress test since his stents were placed but does do strenuous work w/o any CP or SOB    Pt denies any hx of asthma or strokes     FHx unknown; Pt refuses to get COVID vaccine     Pt is an exsmoker quit  and denies alcohol use        Screenings for behavioral, psychosocial and functional/safety risks, and cognitive dysfunction are all negative except as indicated below. These results, as well as other patient data from the [address] form, are documented in Flowsheets linked to this Encounter. Allergies   Allergen Reactions    Fish Oil Anaphylaxis    Shellfish Allergy Anaphylaxis    Lisinopril      Other reaction(s): Cough   h   Prior to Visit Medications    Medication Sig Taking?  Authorizing Provider   cephALEXin (KEFLEX) 500 MG capsule Take 1 capsule by mouth 3 times daily for 7 days Yes Linda Almaguer MD   levoFLOXacin (LEVAQUIN) 500 MG tablet Take 1 tablet by mouth daily Yes Linda Almaguer MD   atorvastatin (LIPITOR) 40 MG tablet Take 40 mg by mouth nightly  Yes Historical Provider, MD   valsartan (DIOVAN) 80 MG tablet Take 80 mg by mouth daily Yes Historical Provider, MD   Multiple Vitamin (MULTIVITAMIN) tablet Take 1 tablet by mouth daily Yes Historical Provider, MD   Magnesium 400 MG CAPS Take 400 mg by mouth daily  Yes Historical Provider, MD   vitamin D-3 (CHOLECALCIFEROL) 125 MCG (5000 UT) TABS Take by mouth Yes Historical Provider, MD   vitamin C (ASCORBIC ACID) 500 MG tablet Take 500 mg by mouth daily  Yes Historical Provider, MD   methylPREDNISolone (MEDROL, YANA,) 4 MG tablet Take 1 tablet by mouth See Admin Instructions Take by mouth. Patient not taking: Reported on 6/1/2021  Tiesha Cartwright MD   methylPREDNISolone (MEDROL, YANA,) 4 MG tablet Take 1 tablet by mouth See Admin Instructions Take by mouth. Patient not taking: Reported on 5/25/2021  Teisha Cartwright MD   3 times daily for 7 days Yes Tiesha Cartwright MD   levoFLOXacin (LEVAQUIN) 500 MG tablet Take 1 tablet by mouth daily Yes Tiesha Cartwright MD   atorvastatin (LIPITOR) 40 MG tablet Take 40 mg by mouth nightly  Yes Historical Provider, MD   valsartan (DIOVAN) 80 MG tablet Take 80 mg by mouth daily Yes Historical Provider, MD   Multiple Vitamin (MULTIVITAMIN) tablet Take 1 tablet by mouth daily Yes Historical Provider, MD   Magnesium 400 MG CAPS Take 400 mg by mouth daily  Yes Historical Provider, MD   vitamin D-3 (CHOLECALCIFEROL) 125 MCG (5000 UT) TABS Take by mouth Yes Historical Provider, MD   vitamin C (ASCORBIC ACID) 500 MG tablet Take 500 mg by mouth daily  Yes Historical Provider, MD   methylPREDNISolone (MEDROL, YANA,) 4 MG tablet Take 1 tablet by mouth See Admin Instructions Take by mouth. Patient not taking: Reported on 6/1/2021  Tiesha Cartwright MD   methylPREDNISolone (MEDROL, YANA,) 4 MG tablet Take 1 tablet by mouth See Admin Instructions Take by mouth.   Patient not taking: Reported on 5/25/2021  Tiesha Cartwright MD      Diagnosis Date    AAA [facility]abdominal aortic aneurysm[facility])     CAD (coronary artery disease)     stents 2017    Hyperlipidemia     Hypertension     Nasal bleeding     worse since covid making the mask mandatory     Past Surgical History:   Procedure Laterality Date    CARDIAC SURGERY  2017    stents placed     COLONOSCOPY      SINUS ENDOSCOPY N/A 11/2/2020    FUNCTIONAL ENDOSCOPIC SINUS SURGERY performed by Dede Pearson MD at [address] [place] [address] Right 4/12/2021    RIGHT FUNCTIONAL ENDOSCOPIC SINUS SURGERY (02556, 25466, 09093) performed by Dede Pearson MD at [facility]       Family History   Problem Relation Age of Onset    Other Brother         anurysim       CareTeam (Including outside providers/suppliers regularly involved in providing care):   Patient Care Team:  Marisela Noriega MD as PCP - General (Family Medicine)  Marisela Noriega MD as PCP - [facility] Empaneled Provider  Dede Pearson MD as Consulting Physician (Otolaryngology)    Review of Systems   Constitutional: Negative for fatigue and fever. HENT: Positive for nosebleeds (chronic R nare and uses Afrin spray as needed). Negative for sore throat. Eyes:        Negative blurred vision or diplopia   Respiratory: Negative for cough and shortness of breath. Cardiovascular: Negative for chest pain, palpitations and leg swelling. Gastrointestinal: Negative for abdominal pain, blood in stool, nausea and vomiting. Negative melena or indigestion   Endocrine: Negative for polydipsia and polyuria. Genitourinary: Negative for dysuria and hematuria. Musculoskeletal: Negative for arthralgias. Skin: Negative for rash. Neurological: Negative for dizziness, seizures, syncope, speech difficulty, weakness and headaches. Psychiatric/Behavioral: Positive for dysphoric mood (from recent family and pet dog deaths). The patient is not nervous/anxious.          Wt Readings from Last 3 Encounters:   06/01/21 197 lb (89.4 kg)   04/20/21 195 lb (88.5 kg)   04/12/21 198 lb (89.8 kg)     Vitals:    06/01/21 1114 06/01/21 1129   BP: (!) 140/96 138/82   Site: Right Upper Arm    Position: Sitting    Cuff Size: Medium Adult    Pulse: 89    SpO2: 99%    Weight: 197 lb (89.4 kg)      Body mass index is 29.95 kg/m². Based upon direct observation of the patient, evaluation of cognition reveals recent and remote memory intact. Physical Exam  Vitals reviewed. Constitutional:       General: He is not in acute distress. HENT:      Nose:      Comments: Gauze packing in place in R nostril     Mouth/Throat:      Mouth: Mucous membranes are moist.      Pharynx: Oropharynx is clear. Eyes:      General: No scleral icterus. Pupils: Pupils are equal, round, and reactive to light. Comments: Pink conjunctivae    Neck:      Thyroid: No thyromegaly. Comments: No carotid bruits noted B/L  Cardiovascular:      Heart sounds: Murmur (2/6 systolic) heard. No friction rub. No gallop. Pulmonary:      Effort: Pulmonary effort is normal.      Breath sounds: Normal breath sounds. Abdominal:      Palpations: Abdomen is soft. Tenderness: There is no abdominal tenderness. Musculoskeletal:      Comments: No leg edema noted B/L   Lymphadenopathy:      Cervical: No cervical adenopathy. Skin:     Findings: No rash. Neurological:      Mental Status: He is alert. Comments: Cranial nerves 2 - 12 grossly intact; Muscle strength 5/5 throughout   Psychiatric:         Mood and Affect: Mood normal.          Patient's complete Health Risk Assessment and screening values have been reviewed and are found in Flowsheets. The following problems were reviewed today and where indicated follow up appointments were made and/or referrals ordered.     Positive Risk Factor Screenings with Interventions:       Depression:  PHQ-2 Score: 4  PHQ-9 Total Score: 17    Severity:1-4 = minimal depression, 5-9 = mild depression, 10-14 = moderate depression, 15-19 = moderately severe depression, 20-27 = severe depression  Depression Interventions:  · Patient declines any further evaluation/treatment for this issue as trigger is recent family and pet dog deaths        General Health and ACP:  General  In general, how would you say your health is?: Very Good  In the past 7 days, have you experienced any of the following? New or Increased Pain, New or Increased Fatigue, Loneliness, Social Isolation, Stress or Anger?: (!) New or Increased Fatigue  Do you get the social and emotional support that you need?: Yes  Do you have a Living Will?: (!) No  Advance Directives     Power of  Living Will ACP-Advance Directive ACP-Power of     Not on File Not on File Not on File Not on File      General Health Risk Interventions:  · No Living Will: Pt given information for completion of a living will        Personalized Preventive Plan   Current Health Maintenance Status  Immunization History   Administered Date(s) Administered    Tdap (Boostrix, Adacel) 09/16/2019        Patient Instructions     Personalized Preventive Plan for Sasha Bhatti - 6/1/2021  Medicare offers a range of preventive health benefits. Some of the tests and screenings are paid in full while other may be subject to a deductible, co-insurance, and/or copay. Some of these benefits include a comprehensive review of your medical history including lifestyle, illnesses that may run in your family, and various assessments and screenings as appropriate. After reviewing your medical record and screening and assessments performed today your provider may have ordered immunizations, labs, imaging, and/or referrals for you. A list of these orders (if applicable) as well as your Preventive Care list are included within your After Visit Summary for your review. Other Preventive Recommendations:    · A preventive eye exam performed by an eye specialist is recommended every 1-2 years to screen for glaucoma; cataracts, macular degeneration, and other eye disorders.   · A preventive dental visit is recommended every 6 denies any CP or SOB      Patient was told to go to the ER ASAP if you develop any chest pain, shortness of breath, facial droop, slurred speech, weakness/numbness in your arms or legs or double vision

## 2021-06-15 NOTE — PROGRESS NOTES
Patient seems to be doing quite well with his radiation therapy. However, he is having an S of amount of drainage as expected. He has had no bleeding. Currently, he appears in no obvious acute distress. Ear examination continues to reveal normal findings with normal-appearing tympanic membranes and ear canals bilaterally. Oral examination is unremarkable. The neck is free of any adenopathy, mass, thyroid enlargement. Nasal mucosa treated with cotton pledgets  impregnated with Afrin and lidocaine placed in middle meatuses against turbinates. Pledgets left in place for five minutes and removed enabling enhanced visualization. The exam revealed positive edema of mucosa. it was negative for erythema indicative of infection. There was evidence of deviation of the septum which was not significant. There were not polyps present. .There were not other masses present. The turbinates were not enlarged beyond normal.  Tumor seems to be subsiding and there is necrosis causing drainage. No evidence of bleeding is noted. The deviation of the septum to the left is not particularly obstructing. We will try a course of Singulair along with guaifenesin. I will see him again in 2 weeks.

## 2021-06-29 NOTE — PROGRESS NOTES
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 He has had no further nosebleeds. He is somewhat dry as expected. He is continuing to use sinus washes and is using saline spray intermittently as well. Once again, healing and radiation therapy seem to be effective on the right side. No other abnormalities are noted. I have suggested a return in 1 to 2months corresponding to his completion of radiation therapy. At that time, repeat CT scan will be done and he will need to be followed every 3 months at least for the next year with likely PET scan later in the year. Oral examination is unremarkable as is the ear evaluation. The neck remains free of any adenopathy, mass, thyroid enlargement as well.

## 2021-08-23 NOTE — PROGRESS NOTES
[address] VISIT      Patient Name: Dayan Meeks  Medical Record Number:  [id number]  Primary Care Physician:  Moseh Cha MD    ChiefComplaint     Chief Complaint   Patient presents with    Follow-up     nose checked throat checked and check both ears       History of Present Illness     Dayan Meeks is an 67 y.o. male previously seen for right sinonasal squamous cell carcinoma. Status post IMRT radiation completed July 14 2021. Functional endoscopic sinus surgery bilateral with primarily right side, on the left side it was anterior ethmoid and maxillary sinus, on the right side it was anterior and posterior ethmoidectomies, sphenoid sinusotomies, maxillary antrostomy, frontal sinus ostium enlargement and removal of large polypoid tissue by Dr. Marisa Scruggs on 11/2/2020. Right-sided functional endoscopic sinus surgery including removal of tumor from anterior and posterior ethmoids, sphenoid sinus opening, and maxillary sinus by Dr. Marisa Scruggs on 4/12/2021. Interval History:   Decreased hearing right ear. Hears heartbeat in it when he lies down. Right nose still clogged. Has been doing sinonasal rinses daily and during this he would get large joints of scabbing and debris. Using Afrin every other day. Recently has been having bad taste in mouth.      Past Medical History     Past Medical History:   Diagnosis Date    AAA (abdominal aortic aneurysm) ([address]     CAD (coronary artery disease)     stents 2017    Hyperlipidemia     Hypertension     Nasal bleeding     worse since covid making the mask mandatory       Past Surgical History     Past Surgical History:   Procedure Laterality Date    CARDIAC SURGERY  2017    stents placed     COLONOSCOPY      SINUS ENDOSCOPY N/A 11/2/2020    FUNCTIONAL ENDOSCOPIC SINUS SURGERY performed by Emilia Oh MD at [address] [place] [address] Right 4/12/2021    RIGHT FUNCTIONAL ENDOSCOPIC SINUS SURGERY (84077, 32703, 51754) performed by Aditi Mcdermott MD at [facility]       Family History     Family History   Problem Relation Age of Onset    Other Brother         anurysim       Social History     Social History     Tobacco Use    Smoking status: Never Smoker    Smokeless tobacco: Never Used   Vaping Use    Vaping Use: Never used   Substance Use Topics    Alcohol use: Never    Drug use: Never        Allergies     Allergies   Allergen Reactions    Fish Oil Anaphylaxis    Shellfish Allergy Anaphylaxis    Lisinopril      Other reaction(s): Cough       Medications     Current Outpatient Medications   Medication Sig Dispense Refill    montelukast (SINGULAIR) 10 MG tablet TAKE 1 TABLET BY MOUTH EVERY NIGHT 90 tablet 3    valsartan (DIOVAN) 80 MG tablet Take 80 mg by mouth daily      Multiple Vitamin (MULTIVITAMIN) tablet Take 1 tablet by mouth daily       vitamin D-3 (CHOLECALCIFEROL) 125 MCG (5000 UT) TABS Take by mouth      vitamin C (ASCORBIC ACID) 500 MG tablet Take 500 mg by mouth daily       methylPREDNISolone (MEDROL, YANA,) 4 MG tablet Take 1 tablet by mouth See Admin Instructions Take by mouth. (Patient not taking: Reported on 6/1/2021) 1 kit 0    methylPREDNISolone (MEDROL, YANA,) 4 MG tablet Take 1 tablet by mouth See Admin Instructions Take by mouth. (Patient not taking: Reported on 5/25/2021) 1 kit 0    levoFLOXacin (LEVAQUIN) 500 MG tablet Take 1 tablet by mouth daily (Patient not taking: Reported on 6/29/2021) 10 tablet 0    atorvastatin (LIPITOR) 40 MG tablet Take 40 mg by mouth nightly       Magnesium 400 MG CAPS Take 400 mg by mouth daily  (Patient not taking: Reported on 6/29/2021)       No current facility-administered medications for this visit.        Review of Systems     REVIEW OF SYSTEMS  The following systems were reviewed and revealed the following in addition to any already discussed in the HPI:    CONSTITUTIONAL: no weight loss, no fever, no night sweats, no chills  EYES: no vision changes, no blurry vision  EARS: no changes in hearing, no otalgia  NOSE: no epistaxis, rhinorrhea  THROAT: No voice changes, no sore throat, no dysphagia    PhysicalExam     Vitals:    08/23/21 1421   BP: 108/76   Pulse: 56   Temp: 97.8 °F (36.6 °C)       PHYSICAL EXAM  /76   Pulse 56   Temp 97.8 °F (36.6 °C)     GENERAL: No acute distress, alert and oriented, no hoarseness  EYES: EOMI, Anti-icteric  NOSE: See procedure note below. EARS: Normal external appearance; on portable otomicroscopy:     -Ad: External auditory canal without stenosis, tympanic membrane clear, no middle ear effusions or retractions     -As: External auditory canal without stenosis, tympanic membrane clear, no middle ear effusions or retractions  FACE: HB 1/6 bilaterally, symmetric appearing, sensation equal bilaterally  ORAL CAVITY: No masses or lesions visualized or palpated, uvula is midline, moist mucous membranes  NECK: Normal range of motion, no thyromegaly, trachea is midline, no palpable lymphadenopathy or neck masses, no crepitus  CHEST: Normal respiratory effort, breathing comfortably, no retractions  SKIN: No rashes, normal appearing skin, no evidence of skin lesions/tumors  NEURO: Cranial Nerves 2, 3, 4, 5, 6, 7, 11, 12 intact bilaterally     I have performed a head and neck physical exam personally or was physically present during the key or critical portions of the service. Procedure     Nasal Endoscopy, Diagnostic (87117)    Pre-op: Squamous cell carcinoma nasal cavity and sinus  Post-op: Same  Procedure: Nasal endoscopy    After obtaining verbal consent from the patient 2% lidocaine with afrin was sprayed into the nasal cavities. After allowing a time for anesthesia, a 0 and 30-degree rigid nasal endoscope was used to examine the nasal septum, turbinates, and mucosal tissue. The middle meatus and sphenoethmoid recess was examined bilaterally. There were no complications. Pertinent findings include: There was dense hard mucoid debris in the right nasal passage which was removed with #10 Pashto suction and alligator forceps. Underlying this hard debris was some mucopurulent drainage which was suctioned. Right middle turbinate small. Nasal septum deviated to the left. After complete suctioning of debris and purulent rhinorrhea there was noted to be a friable pedunculated area of the posterior lateral nasal wall, see picture below. *The patient tolerated the procedure well without complication. I attest that I was present for and did the entire procedure myself. Assessment and Plan     1. Cancer of nasal cavity and sinus ([facility] [address]  2. S/P radiation therapy  On nasal endoscopy after thorough debridement there was noted to be a friable slightly exophytic lesion along the posterior right lateral nasal wall. This may be secondary to postoperative changes or changes related to recent IMRT. He has been on many different antibiotics and multiple rounds of steroids recently would like to hold off any type of systemic therapies. Will start on medicated sinonasal irrigations and we will follow up in 2 weeks for repeat endoscopic evaluation for close surveillance. Follow-Up     Return in about 2 weeks (around 9/6/2021). Dr. John Garcia [place] [id number]  Department of Otolaryngology/Head and Neck Surgery  8/25/21    Medical Decision Making: The following items were considered in medical decision making:  Independent review of images  Review / order clinical lab tests  Review / order radiology tests  Decision to obtain old records    Portions of this note were dictated using Dragon.  There may be linguistic errors secondary to the use of this program.

## 2021-08-31 NOTE — PROGRESS NOTES
Chief Complaint   Patient presents with    Back Pain     c/o back pain x 14 days          Syd Donovan is a 67 y.o. male who presents complaining of a constant sharp pain in the R shoulder blade area x 3 weeks. Pt denies any preceding trauma. Pt has been using a heating pad and massage therapy with some relief. Pt admits to concurrent episodes of belching and denies any prior abdominal surgeries. 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 Pt is currently on a saline/steroid/antibiotic spray in his nose twice a day    Patient has a past medical history significant for dyslipidemia, HTN and CAD s/p MI with stent placement 2017 and is followed by Cardiology and is on lipitor and diovan medication.      Pt has never had a cardiac stress test since his stents were placed but does do strenuous work w/o any CP or SOB     Pt denies any hx of asthma or strokes     FHx unknown; Pt refuses to get COVID vaccine     Pt is an exsmoker quit 2001 and denies alcohol use        US abdomen aorta 09/30/2019  3.0 cm aneurysmal dilatation proximal abdominal aorta. Mid aorta 2.6 cm. Three year follow-up is recommended        Review of Systems   Constitutional: Positive for fatigue. Negative for fever. HENT: Positive for postnasal drip (of mucus daily with radiation treatments). Negative for nosebleeds and sore throat. Eyes:        Negative blurred vision or diplopia   Respiratory: Negative for cough and shortness of breath. Cardiovascular: Negative for chest pain, palpitations and leg swelling. Gastrointestinal: Positive for nausea (from back pain). Negative for abdominal pain, blood in stool and vomiting. Negative melena; Positive indigestion with belching episodes   Endocrine: Negative for polydipsia and polyuria. Genitourinary: Negative for dysuria and hematuria. Musculoskeletal: Negative for arthralgias.         Positive constant sharp R shoulder blade pain x 3 weeks   Skin: Negative for rash. Neurological: Negative for dizziness, seizures, syncope, speech difficulty, weakness and headaches. Psychiatric/Behavioral: Positive for dysphoric mood (from recent family and pet dog deaths). The patient is not nervous/anxious. Vitals:    08/31/21 0809   BP: 132/80   Pulse: 50   Temp: 98.6 °F (37 °C)   SpO2: 97%         Physical Exam  Vitals reviewed. Constitutional:       General: He is in acute distress (mild from pain). HENT:      Mouth/Throat:      Mouth: Mucous membranes are moist.      Pharynx: Oropharynx is clear. Eyes:      General: No scleral icterus. Pupils: Pupils are equal, round, and reactive to light. Comments: Pink conjunctivae    Neck:      Thyroid: No thyromegaly. Cardiovascular:      Heart sounds: Normal heart sounds. No murmur heard. No friction rub. No gallop. Comments: With occasional pause noted  Pulmonary:      Effort: Pulmonary effort is normal.      Breath sounds: Normal breath sounds. Comments: Positive reproduced pain noted with deep breath  Abdominal:      Palpations: Abdomen is soft. Tenderness: There is no abdominal tenderness. Musculoskeletal:      Comments: No leg edema noted B/L;  Back : nontender vertebral spine and posterior ribcage/musculature to palpation   Lymphadenopathy:      Cervical: No cervical adenopathy. Skin:     Findings: No rash. Neurological:      Mental Status: He is alert. Comments: Cranial nerves 2 - 12 grossly intact; Muscle strength 5/5 throughout   Psychiatric:         Mood and Affect: Mood normal.         ASSESSMENT AND PLAN    1. Shoulder blade pain/Coronary artery disease/Abdominal aortic aneurysm (AAA)  Pt with a 3 week hx of constant sharp B/L shoulder blade pain with associated episodes of belching and indigestion and is with no reproducible pain on back/posterior ribcage palpation on PE.  Concern is thus for enlargement of his abdominal aortic aneurysm versus ischemia from CAD as cause and patient's wife was told to take patient to the ER ASAP for further evaluation with STAT bloodwork, EKG, CXR and abdominal US. Pt is with stable VS and clear lung fields on PE. Another possibility is underlying cholelithiasis as cause though pt is with a nontender abdomen on PE        Moshe Cha MD      Return in about 2 weeks (around 9/14/2021).

## 2021-09-07 PROBLEM — R52 UNCONTROLLED PAIN: Status: ACTIVE | Noted: 2021-01-01

## 2021-09-07 NOTE — ED PROVIDER NOTES
[address]        Pt Name: Valentino Clos  MRN: [id number]  [place] 1945  Date of evaluation: 9/7/2021  Provider: German Gray MD  PCP: Homer Bautista MD    This patient was seen and evaluated by the attending physician German Gray MD.      [address] [place] [address]       Chief Complaint   Patient presents with    Back Pain     Patient in by squad from home with complaints of back pain. States he had radiation treatment 3 week. Admitted 1 week ago for same pain at [place] [address]. HISTORY OF PRESENT ILLNESS   (Location/Symptom, Timing/Onset, Context/Setting, Quality, Duration, Modifying Factors, Severity)  Note limiting factors. Valentino Clos is a 67 y.o. male here today with a chief complaint of midline midthoracic back pain. He has a history of coronary disease recent diagnosed sinus cancer with localized radiation therapy of the sinuses, hypertension dyslipidemia and a few weeks of worsening midthoracic back pain worse with movement relieved by belching. Was admitted to [facility] few weeks ago just discharged once about a week back with benign EKG and troponins and normal CT chest angiography that ruled out any aortic pulmonary artery lung or bone issues. Had a nuclear medicine stress test with some fixed defects but no obvious reversible ischemia. That juncture they wanted obtain a biliary ultrasound but he was discharged home on his own on recognizance first.  Here today with the chief complaint of worsening pain when he moves around relieved by belching. No nausea vomiting fevers chills sweats no cough or dyspnea. Nursing Notes were all reviewed and agreed with or any disagreements were addressed  in the HPI. REVIEW OF SYSTEMS    (2-9 systems for level 4, 10 or more for level 5)     Review of Systems    Positives and Pertinent negatives as per HPI.   Except as noted abovein the ROS, all other systems were reviewed and negative. PAST MEDICAL HISTORY     Past Medical History:   Diagnosis Date    [facility]     CAD (coronary artery disease)     stents 2017    Cancer ([facility]     Hyperlipidemia     Hypertension     Nasal bleeding     worse since covid making the mask mandatory         SURGICAL HISTORY     Past Surgical History:   Procedure Laterality Date    CARDIAC SURGERY  2017    stents placed     COLONOSCOPY      SINUS ENDOSCOPY N/A 11/2/2020    FUNCTIONAL ENDOSCOPIC SINUS SURGERY performed by Rosendo Liz MD at [address] [place] [address] 4/12/2021    RIGHT FUNCTIONAL ENDOSCOPIC SINUS SURGERY ([address], [address], [id number]) performed by Rosendo Liz MD at [address] 9/8/2021    EGD GASTRIC BIOPSY performed by Titi Phipps MD at [address]       Current Discharge Medication List      CONTINUE these medications which have NOT CHANGED    Details   atorvastatin (LIPITOR) 40 MG tablet Take 40 mg by mouth nightly       valsartan (DIOVAN) 80 MG tablet Take 80 mg by mouth daily      Multiple Vitamin (MULTIVITAMIN) tablet Take 1 tablet by mouth daily       Magnesium 400 MG CAPS Take 400 mg by mouth daily       vitamin D-3 (CHOLECALCIFEROL) 125 MCG (5000 UT) TABS Take by mouth      vitamin C (ASCORBIC ACID) 500 MG tablet Take 500 mg by mouth daily       !! methylPREDNISolone (MEDROL, YANA,) 4 MG tablet Take 1 tablet by mouth See Admin Instructions Take by mouth. Qty: 1 kit, Refills: 0      !! methylPREDNISolone (MEDROL, YANA,) 4 MG tablet Take 1 tablet by mouth See Admin Instructions Take by mouth. Qty: 1 kit, Refills: 0      levoFLOXacin (LEVAQUIN) 500 MG tablet Take 1 tablet by mouth daily  Qty: 10 tablet, Refills: 0    Associated Diagnoses: Cancer of nasal cavity and sinus ([facility] Recurrent sinusitis       ! ! - Potential duplicate medications found. Please discuss with provider.             ALLERGIES Fish oil, Shellfish allergy, and Lisinopril    FAMILYHISTORY       Family History   Problem Relation Age of Onset    Other Brother         anurysim          SOCIAL HISTORY       Social History     Socioeconomic History    Marital status:      Spouse name: None    Number of children: None    Years of education: None    Highest education level: None   Occupational History    None   Tobacco Use    Smoking status: Never Smoker    Smokeless tobacco: Never Used   Vaping Use    Vaping Use: Never used   Substance and Sexual Activity    Alcohol use: Never    Drug use: Never    Sexual activity: None   Other Topics Concern    None   Social History Narrative    None     Social Determinants of Health     Financial Resource Strain: Low Risk     Difficulty of Paying Living Expenses: Not hard at all   Food Insecurity: No Food Insecurity    Worried About Running Out of Food in the Last Year: Never true    [place] of Food in the Last Year: Never true   Transportation Needs:     Lack of Transportation (Medical):      Lack of Transportation (Non-Medical):    Physical Activity:     Days of Exercise per Week:     Minutes of Exercise per Session:    Stress:     Feeling of Stress :    Social Connections:     Frequency of Communication with Friends and Family:     Frequency of Social Gatherings with Friends and Family:     Attends Je[employer] witness Services:     Active Member of Clubs or Organizations:     Attends Club or Organization Meetings:     Marital Status:    Intimate Partner Violence:     Fear of Current or Ex-Partner:     Emotionally Abused:     Physically Abused:     Sexually Abused:        SCREENINGS    [place] Coma Scale  Eye Opening: Spontaneous  Best Verbal Response: Oriented  Best Motor Response: Obeys commands  [place] Coma Scale Score: 15        PHYSICAL EXAM    (up to 7 for level 4, 8 or more for level 5)     ED Triage Vitals [09/07/21 1152]   BP Temp Temp Source Pulse Resp SpO2 Height Weight   (!) 190/134 97.7 °F (36.5 °C) Oral 109 24 96 % -- --       Physical Exam     General Appearance:  Alert, cooperative, no distress, appears stated age. Hypertensive. Diaphoretic. Tachycardic. Head:  Normocephalic, without obvious abnormality, atraumatic. Eyes:  conjunctiva/corneas clear, EOM's intact. Sclera anicteric. ENT: Mucous membranes moist.   Neck: Supple, symmetrical, trachea midline, no adenopathy. No jugular venous distention. Lungs:   No Respiratory Distress. Chest Wall:  Atraumatic   Heart:  RRR, Rate 100, no m/c/g/r   Abdomen:   Soft, NT, ND   Extremities:  Full range of motion. Pulses: Symmetric x4   Skin:  No rashes or lesions to exposed skin. Neurologic: Alert and oriented X 3. Motor grossly normal.  Speech clear.           DIAGNOSTIC RESULTS   LABS:    Labs Reviewed   CBC WITH AUTO DIFFERENTIAL - Abnormal; Notable for the following components:       Result Value    WBC 21.4 (*)     Neutrophils Absolute 19.2 (*)     All other components within normal limits    Narrative:     Performed at:  [facility] [address], [place], [address]   Phone [phone]   COMPREHENSIVE METABOLIC PANEL W/ REFLEX TO MG FOR LOW K - Abnormal; Notable for the following components:    Sodium 135 (*)     Chloride 98 (*)     CO2 20 (*)     Anion Gap 17 (*)     Glucose 203 (*)     All other components within normal limits    Narrative:     Performed at:  [facility] [address], [place], [address]   Phone [phone]   PROTIME-INR - Abnormal; Notable for the following components:    Protime 12.8 (*)     INR 1.13 (*)     All other components within normal limits    Narrative:     Performed at:  [facility] [address], [place], [address]   Phone [phone]   URINALYSIS - Abnormal; Notable for the following components:    Glucose, Ur 100 (*)     Ketones, Urine TRACE (*) Protein,  (*)     All other components within normal limits    Narrative:     Performed at:  [facility] [address],  [place] [address] [employer]   Phone [phone]   LACTATE, SEPSIS - Abnormal; Notable for the following components:    Lactic Acid, Sepsis 4.1 (*)     All other components within normal limits    Narrative:     Nalini Guzman  [facility] tel. [phone],  Chemistry results called to and read back by darnell mcadams, 09/07/2021 13:45,  by [facility]  Performed at:  [facility] [address],  [place], [address]   Phone [phone]   BLOOD GAS, VENOUS - Abnormal; Notable for the following components:    pCO2, Hieu 30.7 (*)     pO2, Hieu 158.0 (*)     HCO3, Venous 19.9 (*)     Base Excess, Hieu -3.5 (*)     Carboxyhemoglobin 4.5 (*)     All other components within normal limits    Narrative:     Performed at:  [facility] [address], [place], [address]   Phone [phone]   LACTATE, SEPSIS - Abnormal; Notable for the following components:    Lactic Acid, Sepsis 3.0 (*)     All other components within normal limits    Narrative:     Performed at:  [facility] [address],  [place], [facility] [employer]   Phone [phone]   LACTATE, SEPSIS - Abnormal; Notable for the following components:    Lactic Acid, Sepsis 2.8 (*)     All other components within normal limits    Narrative:     Performed at:  [facility] [address],  [place] [address] [employer]   Phone [phone]   BASIC METABOLIC PANEL - Abnormal; Notable for the following components:    Sodium 132 (*)     Glucose 196 (*)     BUN 21 (*)     CREATININE 0.7 (*)     All other components within normal limits    Narrative:     Performed at:  [facility] [address]  [place], [facility] [employer]   Phone [phone]   CBC WITH AUTO DIFFERENTIAL - Abnormal; Notable for the following components:    WBC 20.4 (*)     Hemoglobin 12.6 (*)     Hematocrit 37.4 (*)     Neutrophils Absolute 17.8 (*)     Lymphocytes Absolute 0.7 (*)     Monocytes Absolute 1.8 (*)     All other components within normal limits    Narrative:     Performed at:  [facility] [address],  [place], [facility] [employer]   Phone [phone]   PHOSPHORUS - Abnormal; Notable for the following components:    Phosphorus 2.1 (*)     All other components within normal limits    Narrative:     Performed at:  [facility] [address],  [place], [address] [employer]   Phone [phone]   HEMOGLOBIN AND HEMATOCRIT, BLOOD - Abnormal; Notable for the following components:    Hemoglobin 12.6 (*)     Hematocrit 39.0 (*)     All other components within normal limits    Narrative:     Performed at:  [facility] [address], [place], [address] [employer]   Phone [phone]   BASIC METABOLIC PANEL - Abnormal; Notable for the following components:    Sodium 130 (*)     CO2 19 (*)     Glucose 156 (*)     All other components within normal limits    Narrative:     Performed at:  [facility] [address],  [place] [address] [employer]   Phone [phone]   CBC WITH AUTO DIFFERENTIAL - Abnormal; Notable for the following components:    WBC 22.3 (*)     Hemoglobin 12.0 (*)     Hematocrit 35.9 (*)     Neutrophils Absolute 20.0 (*)     Lymphocytes Absolute 0.7 (*)     Monocytes Absolute 1.6 (*)     All other components within normal limits    Narrative:     Performed at:  [facility] [address],  [place] [address] [employer]   Phone [phone]   PHOSPHORUS - Abnormal; Notable for the following components:    Phosphorus 2.3 (*)     All other components within normal limits    Narrative:     Performed at:  [facility] [address] [facility] Narrative:     Performed at:  [facility]  [address],  [place], [address]   Phone [phone]   BRAIN NATRIURETIC PEPTIDE    Narrative:     Performed at:  [facility]  [address],  [place], [address]   Phone [phone]   APTT    Narrative:     Performed at:  [facility]  [address],  [place], [address]   Phone [phone]   MICROSCOPIC URINALYSIS    Narrative:     Performed at:  [facility]  [address],  [place], [address]   Phone [phone]   MAGNESIUM    Narrative:     Performed at:  [facility]  [address],  [place], [address]   Phone [phone], RANDOM    Narrative:     Performed at:  [facility]  [address],  [place], [address]   Phone [phone]   MAGNESIUM    Narrative:     Performed at:  [facility]  [address]  [place], [address]   Phone [phone]   SURGICAL PATHOLOGY   SURGICAL PATHOLOGY   HEMOGLOBIN AND HEMATOCRIT, BLOOD       All other labs were within normal range or not returned as of this dictation. EKG: All EKG's are interpreted by the Emergency Department Physician in the absence of a cardiologist.  Please see their note for interpretation of EKG. EKG reviewed by myself  Dated today, 1233  Rate 105, Sinus Tach, LAD, NSSTW changes  Prior (2005) was NSR.     RADIOLOGY:   Non-plain film images such as CT, Ultrasound and MRI are read by the radiologist. Ferdie Sauce radiographic images are visualized andpreliminarily interpreted by the  ED Provider with the below findings:        Interpretation perthe Radiologist below, if available at the time of this note:    [address]   Final Result   Liver is heterogeneous in appearance and increased in echogenicity compatible   with diffuse hepatocellular were made to edit the dictations but occasionally words are mis-transcribed.)    Marcellus Barron MD (electronically signed)           Marcellus Barron MD  09/09/21 1930

## 2021-09-07 NOTE — TELEPHONE ENCOUNTER
Patient's wife Ramandeep Carter called regarding patient's status post ER visit. She is concerned that patient is still c/o intense pain. She also states that hospital did not run complete tests as PCP requested. She states that Admitting ER did not inform them of results of testing completed at the facility. And is wanting clinical staff for PCP to call and advise patient and wife what they should do.      Please call and advise

## 2021-09-07 NOTE — TELEPHONE ENCOUNTER
Spoke with patients wife, she states patient is still having a lot if pain since discharge from [address] [place] [address]. Patient was requesting ambulance to be called. Advised wife to call emergency services and have patient taken to a [facility] facility for evaluation.

## 2021-09-07 NOTE — H&P
Hospital Medicine History & Physical      PCP: Rubén Mooney MD    Date of Admission: 9/7/2021    Date of Service: Pt seen/examined on 9/7/2021 and Admitted to Inpatient with expected LOS greater than two midnights due to medical therapy. Chief Complaint: Intractable mid upper back pain. History Of Present Illness: 67 y.o. 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 Patient states that the pain has been there for the past several weeks. Has been addressed with PMD who was concerned for cardiac etiology. Patient was seen at [address] [place] [address] on 8/31/2021 at which time he was evaluated with EKG troponins CT chest and nuclear stress test which did not reveal etiology. Patient was also supposed to get gallbladder ultrasound to rule out biliary etiology of this pain however opted to do it outpatient. Patient presents today to [facility] ER again with intractable back pain. Muscle relaxants prescribed at [facility] are not helping. Per patient spouse have all supply of pain medications from back when he had surgeries and has been managing with taking 1 pill a day. At the ED today CT chest shows metastatic disease and pathologic fracture of T4. Lucent lesions at T9 and L4 are also noted. Patient with marked leukocytosis of 21,000 with elevated lactate at 4.1 with resulting metabolic acidosis with anion gap with respiratory compensation. Hospitalist consulted for admission.       Past Medical History:          Diagnosis Date    AAA [facility]ab[facility]) [facility] [address]     CAD (coronary artery disease)     stents 2017    Cancer ([facility] [address]     Hyperlipidemia     Hypertension     Nasal bleeding     worse since covid making the mask mandatory       Past Surgical History:          Procedure Laterality Date    CARDIAC SURGERY  2017    stents placed     COLONOSCOPY      SINUS ENDOSCOPY N/A 11/2/2020    FUNCTIONAL ENDOSCOPIC SINUS SURGERY performed by Tae Cosme MD at [address] [place] [address] Right 4/12/2021    RIGHT FUNCTIONAL ENDOSCOPIC SINUS SURGERY (88015, [id number], 29879) performed by Tae Cosme MD at [facility]       Medications Prior to Admission:      Prior to Admission medications    Medication Sig Start Date End Date Taking? Authorizing Provider   montelukast (SINGULAIR) 10 MG tablet TAKE 1 TABLET BY MOUTH EVERY NIGHT 9/7/21   Denis Romero DO   methylPREDNISolone (MEDROL, YANA,) 4 MG tablet Take 1 tablet by mouth See Admin Instructions Take by mouth. Patient not taking: Reported on 6/1/2021 4/13/21   Tae Cosme MD   methylPREDNISolone (MEDROL, YANA,) 4 MG tablet Take 1 tablet by mouth See Admin Instructions Take by mouth. Patient not taking: Reported on 5/25/2021 4/12/21   Tae Cosme MD   levoFLOXacin [facility]) 500 MG tablet Take 1 tablet by mouth daily  Patient not taking: Reported on 6/29/2021 4/6/21   Tae Cosme MD   atorvastatin (LIPITOR) 40 MG tablet Take 40 mg by mouth nightly  10/29/19   Historical Provider, MD   valsartan (DIOVAN) 80 MG tablet Take 80 mg by mouth daily 7/1/20   Historical Provider, MD   Multiple Vitamin (MULTIVITAMIN) tablet Take 1 tablet by mouth daily     Historical Provider, MD   Magnesium 400 MG CAPS Take 400 mg by mouth daily     Historical Provider, MD   vitamin D-3 (CHOLECALCIFEROL) 125 MCG (5000 UT) TABS Take by mouth    Historical Provider, MD   vitamin C (ASCORBIC ACID) 500 MG tablet Take 500 mg by mouth daily     Historical Provider, MD       Allergies:  Fish oil, Shellfish allergy, and Lisinopril    Social History:      The patient currently lives home    TOBACCO:   reports that he has never smoked. He has never used smokeless tobacco.  ETOH:   reports no history of alcohol use.   E-Cigarettes/Vaping Use     Questions Responses    E-Cigarette/Vaping Use Never User    Start Date     Passive Exposure     Quit Date     Counseling Given     Comments             Family History:       Reviewed in detail and negative for DM, CAD, Cancer, CVA. Positive as follows:        Problem Relation Age of Onset    Other Brother         anurysim       REVIEW OF SYSTEMS:   Pertinent positives as noted in the HPI. All other systems reviewed and negative. PHYSICAL EXAM PERFORMED:    BP (!) 160/92   Pulse 66   Temp 97.7 °F (36.5 °C) (Oral)   Resp 18   SpO2 95%     General appearance:  Apparent distress, appears stated age and cooperative. HEENT:  Normal cephalic, atraumatic without obvious deformity. Pupils equal, round, and reactive to light. Extra ocular muscles intact. Conjunctivae/corneas clear. Neck: Supple, with full range of motion. No jugular venous distention. Trachea midline. Respiratory:  Normal respiratory effort. Clear to auscultation, bilaterally without Rales/Wheezes/Rhonchi. Cardiovascular:  Regular rate and rhythm with normal S1/S2 without murmurs, rubs or gallops. Abdomen: Soft, non-tender, non-distended with normal bowel sounds. Musculoskeletal:  No clubbing, cyanosis or edema bilaterally. Full range of motion without deformity. Skin: Skin color, texture, turgor normal.  No rashes or lesions. Neurologic:  Neurovascularly intact without any focal sensory/motor deficits.  Cranial nerves: II-XII intact, grossly non-focal.  Psychiatric:  Alert and oriented, thought content appropriate, normal insight  Capillary Refill: Brisk,< 3 seconds   Peripheral Pulses: +2 palpable, equal bilaterally       Labs:     Recent Labs     09/07/21  1315   WBC 21.4*   HGB 13.9   HCT 42.0        Recent Labs     09/07/21  1315   *   K 4.2   CL 98*   CO2 20*   BUN 20   CREATININE 1.1   CALCIUM 10.1     Recent Labs     09/07/21  1315   AST 18   ALT 22   BILITOT 0.6   ALKPHOS 128     Recent Labs     09/07/21  1315   INR 1.13* Recent Labs     09/07/21  1315   TROPONINI <0.01       Urinalysis:    No results found for: Lucia Li, BACTERIA, RBCUA, BLOODU, [address], GLUCOSEU    Radiology:     CXR: I have reviewed the CXR with the following interpretation: Not available, please see CT report  EKG:  I have reviewed the EKG with the following interpretation: Sinus tachycardia with PVCs, nonspecific ST-T wave abnormality    US GALLBLADDER RUQ   Final Result   Liver is heterogeneous in appearance and increased in echogenicity compatible   with diffuse hepatocellular disease, hepatic steatosis. Subtle findings of   the liver are limited due to body habitus. Please refer to CT performed same   day for further evaluation. The gallbladder appears unremarkable in appearance. CT THORACIC SPINE TRAUMA RECONSTRUCTION   Preliminary Result   Findings consistent with metastatic disease and pathologic fracture at T4. The patient is likely a candidate for kyphoplasty and radiofrequency   ablation. Interventional radiology consult is suggested. Lucent lesions at T9 and L4 most likely represent Schmorl's node deformities,   however metastatic disease is difficult to exclude. CT LUMBAR SPINE WO CONTRAST   Preliminary Result   Findings consistent with metastatic disease and pathologic fracture at T4. The patient is likely a candidate for kyphoplasty and radiofrequency   ablation. Interventional radiology consult is suggested. Lucent lesions at T9 and L4 most likely represent Schmorl's node deformities,   however metastatic disease is difficult to exclude. CTA Chest W WO Contrast   Final Result   1. No acute intrathoracic abnormality. 2. No acute intra-abdominal abnormality. CT ABDOMEN PELVIS W IV CONTRAST Additional Contrast? None   Final Result   1. No acute intrathoracic abnormality. 2. No acute intra-abdominal abnormality.              ASSESSMENT:    There are no active hospital problems to display for this patient.         PLAN:    Intractable mid upper back pain  Secondary to T4 pathologic fracture with metastatic disease  Pain control  IR consulted for possible kyphoplasty    Severe sepsis with lactic acidosis  Most likely source upper respiratory infection  Broad-spectrum antibiotics including anaerobic coverage  Follow-up blood cultures  IV fluids    Anion gap acidosis  Likely secondary to elevated lactic acid  IV fluids    Accelerated hypertension  In view of pain  Continue home dose of valsartan  IV hydralazine as needed    History of coronary heart disease  Recent stress test showed no ischemia  Unable to take antiplatelets in view of epistaxis    Squamous cell carcinoma of the sinus  Metastatic  ENT and heme-onc consulted    DVT Prophylaxis: No pharmaceutical anticoagulation as there is concern for epistaxis  Diet: No diet orders on file  Code Status: Prior    PT/OT Eval Status: Ordered none      Electronically signed by Tez Ortiz MD on 9/7/2021 at 6:09 PM

## 2021-09-07 NOTE — ACP (ADVANCE CARE PLANNING)
interventions as needed. Code Status: At this time patient wishes to be Prior  Time Spent with Patient: 15 minutes      Electronically signed by Karthik Perez MD on 9/7/2021 at 7:38 PM  Thank you Brian Wade MD for the opportunity to be involved in this patient's care.

## 2021-09-07 NOTE — ED PROVIDER NOTES
[facility]      Pt Name: Dayan Meeks  MRN: [id number]  [place] 1945  Date of evaluation: 9/7/2021  Provider: Carlita Laura MD  I took over this patient's care from the leaving physician at approximately 1500. I was to check the pending results and finalize the disposition. Dayan Meeks has a past medical history of AAA (abdominal aortic aneurysm) ([facility] [address] CAD (coronary artery disease), Cancer ([facility] [address] Hyperlipidemia, Hypertension, and Nasal bleeding. He has a past surgical history that includes Cardiac surgery (2017); Colonoscopy; Sinus endoscopy (N/A, 11/2/2020); and Sinus endoscopy (Right, 4/12/2021). Vitals:  Blood pressure (!) 223/110, pulse 91, temperature 97.7 °F (36.5 °C), temperature source Oral, resp. rate 20, SpO2 94 %. Constitutional:  67 y.o. male alert, cooperative  HENT:  Atraumatic, mucous membranes moist  Eyes:   Conjunctiva clear, no icterus  Neck:  Supple, no visible JVD, no signs of injury  Thorax & Lungs:  No accessory muscle usage, clear  Abdomen:  Soft, non distended, NT  Back:  No deformity, pain with attempt at movement  Genitalia:  Deferred  Rectal:  Deferred  Extremities:  No cyanosis, no edema  Skin:  Warm, dry  Neurologic:  Alert, no slurred speech  Psychiatric:  Affect appropriate    DIAGNOSTIC RESULTS:  Labs resulted at the time of this note reviewed.    Labs Reviewed   CBC WITH AUTO DIFFERENTIAL - Abnormal; Notable for the following components:       Result Value    WBC 21.4 (*)     Neutrophils Absolute 19.2 (*)     All other components within normal limits    Narrative:     Performed at:  [facility] [address], [place], [address]   Phone [phone]   COMPREHENSIVE METABOLIC PANEL W/ REFLEX TO MG FOR LOW K - Abnormal; Notable for the following components:    Sodium 135 (*)     Chloride 98 (*)     CO2 20 (*)     Anion Gap 17 (*)     Glucose 203 (*)     All other components within normal limits Narrative:     Performed at:  [facility] [address] [place],  [place], [address]   Phone [phone]   PROTIME-INR - Abnormal; Notable for the following components:    Protime 12.8 (*)     INR 1.13 (*)     All other components within normal limits    Narrative:     Performed at:  [facility] [address],  [place], [address]   Phone [phone]   LACTATE, SEPSIS - Abnormal; Notable for the following components:    Lactic Acid, Sepsis 4.1 (*)     All other components within normal limits    Narrative:     Oscar Alanis  [facility] tel. [phone],  Chemistry results called to and read back by darnell mcadams, 09/07/2021 13:45,  by [facility]  Performed at:  [facility] [address],  [place], [address]   Phone [phone]   BLOOD GAS, VENOUS - Abnormal; Notable for the following components:    pCO2, [place] 30.7 (*)     pO2, [address] 158.0 (*)     HCO3, Venous 19.9 (*)     Base Excess, Hieu -3.5 (*)     Carboxyhemoglobin 4.5 (*)     All other components within normal limits    Narrative:     Performed at:  [facility] [address],  [place], [address]   Phone [phone]   CULTURE, URINE   LIPASE    Narrative:     Performed at:  [facility] [address],  [place], [address]   Phone [phone]   AMYLASE    Narrative:     Performed at:  [facility] [address],  [place], [address]   Phone [phone]   TROPONIN    Narrative:     Performed at:  [facility] [address],  [place], [address]   Phone [phone]   BRAIN NATRIURETIC PEPTIDE    Narrative:     Performed at:  [facility] [address],  [place], [address]   Phone [phone]   APTT    Narrative:     Performed at:  [facility]  [address] [facility] [place], [address], [address]   Phone [phone]   URINALYSIS   LACTATE, SEPSIS     RADIOLOGY:    US GALLBLADDER RUQ   Preliminary Result   Liver is heterogeneous in appearance and increased in echogenicity compatible   with diffuse hepatocellular disease, hepatic steatosis. Subtle findings of   the liver are limited due to body habitus. Please refer to CT performed same   day for further evaluation. The gallbladder appears unremarkable in appearance. CT THORACIC SPINE TRAUMA RECONSTRUCTION   Preliminary Result   Findings consistent with metastatic disease and pathologic fracture at T4. The patient is likely a candidate for kyphoplasty and radiofrequency   ablation. Interventional radiology consult is suggested. Lucent lesions at T9 and L4 most likely represent Schmorl's node deformities,   however metastatic disease is difficult to exclude. CT LUMBAR SPINE WO CONTRAST   Preliminary Result   Findings consistent with metastatic disease and pathologic fracture at T4. The patient is likely a candidate for kyphoplasty and radiofrequency   ablation. Interventional radiology consult is suggested. Lucent lesions at T9 and L4 most likely represent Schmorl's node deformities,   however metastatic disease is difficult to exclude. CTA Chest W WO Contrast   Final Result   1. No acute intrathoracic abnormality. 2. No acute intra-abdominal abnormality. CT ABDOMEN PELVIS W IV CONTRAST Additional Contrast? None   Final Result   1. No acute intrathoracic abnormality. 2. No acute intra-abdominal abnormality.            ED COURSE:    Medications administered:  Medications   ondansetron (ZOFRAN) injection 4 mg (4 mg IntraVENous Given 9/7/21 1246)   cefepime (MAXIPIME) 1000 mg IVPB minibag (has no administration in time range)   labetalol (NORMODYNE;TRANDATE) injection 20 mg (has no administration in time range)   0.9 % sodium chloride bolus (0 mLs IntraVENous Stopped 9/7/21 1519) morphine injection 4 mg (4 mg IntraVENous Given 9/7/21 1246)   morphine injection 4 mg (4 mg IntraVENous Given 9/7/21 1400)   iopamidol (ISOVUE-370) 76 % injection 75 mL (75 mLs IntraVENous Given 9/7/21 1442)   HYDROmorphone HCl PF (DILAUDID) injection 1 mg (1 mg IntraVENous Given 9/7/21 1711)     PROCEDURES:  None    CRITICAL CARE:  Total critical care time of 31 minutes (excludes any time for procedures). This includes but not limited to vital sign monitoring, medication, clinical response to medications, interpretation of diagnostics, review of nursing notes, pertinent record review, discussions about patient condition, consultation time, documentation time. Critical care due to the patient's hypertensive urgency. SEP-1 CORE MEASURE DATA  Classification: sepsis  Amount of fluids ordered: less than 30mL/kg because hypertensive urgency  Time at which sepsis was identified: 1800  Broad-spectrum antibiotics chosen: vanc, cefepime based on sepsis order-set for a suspected source of: Unknown  Repeat lactate level: pending  On reassessment after treatment: to be completed by inpatient team    CONSULTATIONS:  Hospitalist     MEDICAL DECISION MAKING: Jayde Parmar is a 67 y.o. male who presented because of back pain. Patient has pathologic fracture from metastases. Patient also has leukocytosis, lactic acidosis. Patient reports a foul drainage from his sinuses. He is currently on a courses of steroids. I initiated IV abx. Pt BP extremely elevated, likely in part due to pain and patient did not take antihypertensive meds for past two days. I discussed the case in full with the admitting physician. FINAL IMPRESSION:    1. Pathologic thoracic fracture, initial encounter    2. Septicemia ([address]    3.  Hypertensive urgency          Gilma Blake MD  09/08/21 1110

## 2021-09-08 NOTE — CONSULTS
[facility]   CONSULT NOTE    9/8/2021 10:34 AM    Patient Information: Fransisca Cedillo   Date of Admit:  9/7/2021  Primary Care Physician:  Moshe Cha MD  Requesting Physician:  Price John MD    Reason for consult:   Evaluation and recommendations for squamous cell carcinoma of the sinus    Chief complaint:    Chief Complaint   Patient presents with    Back Pain     Patient in by squad from home with complaints of back pain. States he had radiation treatment 3 week. Admitted 1 week ago for same pain at [facility]. History of Present Illness:  Fransisca Cedillo is a 67 y.o. male on Price John MD service who was admitted on 9/7/2021 for nausea and vomiting with coffee ground emesis. CT of the abdomen and pelvis did not find acute abnormality. He also has worsening low back pain in the past week. CT of the thoracic and lumbar spine shows lucent lesions at T9 and L4. Findings are concerning for metastatic disease. 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 He completed radiation to the paranasal sinuses with Dr. Katty Benitez on 7/14/2021. He had epistaxis and aeration through the nares improvement since treatment. PET scan in May 2021 did not find any FDG avid disease in the bones. No incontinence of bowel or bladder. He is voiding small amounts of urine at a time. Bladder scan this AM with 355 mL after voiding. Past Medical History:     has a past medical history of AAA (abdominal aortic aneurysm) ([facility] [address] CAD (coronary artery disease), Cancer ([facility] [address] Hyperlipidemia, Hypertension, and Nasal bleeding.      Past Surgical History:    Past Surgical History:   Procedure Laterality Date    CARDIAC SURGERY  2017    stents placed     COLONOSCOPY      SINUS ENDOSCOPY N/A 11/2/2020    FUNCTIONAL ENDOSCOPIC SINUS SURGERY performed by Emilia Oh MD at [address] [place] [address] 4/12/2021    RIGHT FUNCTIONAL ENDOSCOPIC SINUS SURGERY ([id number], [id number], [id number]) performed by Vlad Gil MD at [facility]        Current Medications:     vancomycin  1,000 mg IntraVENous Q12H    atorvastatin  40 mg Oral Nightly    valsartan  80 mg Oral Daily    sodium chloride flush  5-40 mL IntraVENous 2 times per day    famotidine  20 mg Oral BID    cefepime  2,000 mg IntraVENous Q8H    metroNIDAZOLE  500 mg IntraVENous Q8H    lidocaine  1 patch TransDERmal Daily       Allergies: Allergies   Allergen Reactions    Fish Oil Anaphylaxis    Shellfish Allergy Anaphylaxis    Lisinopril      Other reaction(s): Cough        Social History:    reports that he has never smoked. He has never used smokeless tobacco. He reports that he does not drink alcohol and does not use drugs. Family History:     family history includes Other in his brother. ROS:      Per HPI; otherwise 10 point ROS is negative    PHYSICAL EXAM:    Vitals:  Vitals:    09/08/21 1005   BP: (!) 168/95   Pulse:    Resp:    Temp:    SpO2:         Intake/Output Summary (Last 24 hours) at 9/8/2021 1034  Last data filed at 9/8/2021 1005  Gross per 24 hour   Intake 800 ml   Output 1250 ml   Net -450 ml      Wt Readings from Last 3 Encounters:   09/07/21 184 lb 1.6 oz (83.5 kg)   08/31/21 185 lb (83.9 kg)   06/01/21 197 lb (89.4 kg)        General appearance: Appears comfortable. Eyes: Sclera clear, pupils equal  ENT: Moist mucus membranes, no thrush  Neck: Trachea midline, symmetrical  Cardiovascular: Regular rhythm, normal S1, S2. No murmur, gallop, rub. No edema in  lower extremities  Respiratory: Clear to auscultation bilaterally. No wheeze. Good inspiratory effort  Gastrointestinal: Abdomen soft, not tender, not distended, normal bowel sounds  Musculoskeletal: No cyanosis in digits, warm extremities  Neurologic: Cranial nerves grossly intact, no motor or speech deficits. Psychiatric: Normal affect. Alert and oriented to time, place and person.   Skin: Warm, dry, normal turgor, no rash    DATA:  CBC:   Lab Results   Component Value Date    WBC 20.4 09/08/2021    RBC 4.52 09/08/2021    HGB 12.6 09/08/2021    HCT 37.4 09/08/2021    MCV 82.6 09/08/2021    MCH 27.9 09/08/2021    MCHC 33.8 09/08/2021    RDW 13.4 09/08/2021     09/08/2021    MPV 9.3 09/08/2021     BMP:  Lab Results   Component Value Date     09/08/2021    K 4.3 09/08/2021    K 4.2 09/07/2021     09/08/2021    CO2 21 09/08/2021    BUN 21 09/08/2021    CREATININE 0.7 09/08/2021    CREATININE 0.9 07/02/2020    CALCIUM 9.1 09/08/2021    GFRAA >60 09/08/2021    LABGLOM >60 09/08/2021    GLUCOSE 196 09/08/2021     Magnesium:   Lab Results   Component Value Date    MG 2.10 09/08/2021     LIVER PROFILE:   Recent Labs     09/07/21  1315   AST 18   ALT 22   LIPASE 19.0   BILITOT 0.6   ALKPHOS 128     PT/INR:    Lab Results   Component Value Date    PROTIME 12.8 09/07/2021    INR 1.13 09/07/2021     IMAGING:    Narrative   EXAMINATION:   CT OF THE ABDOMEN AND PELVIS WITH CONTRAST; CTA OF THE CHEST WITH AND WITHOUT   CONTRAST 9/7/2021 2:12 pm       TECHNIQUE:   CT of the abdomen and pelvis was performed with the administration of   intravenous contrast. Multiplanar reformatted images are provided for review. Dose modulation, iterative reconstruction, and/or weight based adjustment of   the mA/kV was utilized to reduce the radiation dose to as low as reasonably   achievable.; CTA of the chest was performed before and after the   administration of intravenous contrast.  Multiplanar reformatted images are   provided for review.  MIP images are provided for review.  Dose modulation,   iterative reconstruction, and/or weight based adjustment of the mA/kV was   utilized to reduce the radiation dose to as low as reasonably achievable.       COMPARISON:   None.       HISTORY:   ORDERING SYSTEM PROVIDED HISTORY: Veronique;l for cholecystitis   TECHNOLOGIST PROVIDED HISTORY:   Additional Contrast?->None   Reason for exam:->Veronique;l for cholecystitis   Decision Support Exception - unselect if not a suspected or confirmed   emergency medical condition->Emergency Medical Condition (MA)   Reason for Exam: eval for cholecystitis   Acuity: Acute   Type of Exam: Initial       FINDINGS:   Lungs/pleura: The central airways are patent.  There are no suspicious   pulmonary nodules       Mediastinum: There is no acute mediastinal abnormality there is no evidence   of pulmonary embolism.       Soft Tissues/Bones: No suspicious osteolytic or osteoblastic lesions       Abdominal organs: The liver, spleen, adrenal glands, kidneys, and pancreas   demonstrate no acute abnormality. The gallbladder is unremarkable.       GI/Bowel: The visualized portions of the bowel are unremarkable.       Peritoneum/Retroperitoneum: Unremarkable       Bones: Osseous structures are better evaluated on separate CT of the lumbar   and thoracic spine.  There is a pathologic appearing fracture at T4.           Impression   1. No acute intrathoracic abnormality. 2. No acute intra-abdominal abnormality.         Narrative   EXAMINATION:   CT OF THE ABDOMEN AND PELVIS WITH CONTRAST; CTA OF THE CHEST WITH AND WITHOUT   CONTRAST 9/7/2021 2:12 pm       TECHNIQUE:   CT of the abdomen and pelvis was performed with the administration of   intravenous contrast. Multiplanar reformatted images are provided for review. Dose modulation, iterative reconstruction, and/or weight based adjustment of   the mA/kV was utilized to reduce the radiation dose to as low as reasonably   achievable.; CTA of the chest was performed before and after the   administration of intravenous contrast.  Multiplanar reformatted images are   provided for review.  MIP images are provided for review.  Dose modulation,   iterative reconstruction, and/or weight based adjustment of the mA/kV was   utilized to reduce the radiation dose to as low as reasonably achievable.       COMPARISON:   None.       HISTORY:   ORDERING SYSTEM PROVIDED HISTORY: Veronique;l for cholecystitis   TECHNOLOGIST PROVIDED HISTORY:   Additional Contrast?->None   Reason for exam:->Veronique;l for cholecystitis   Decision Support Exception - unselect if not a suspected or confirmed   emergency medical condition->Emergency Medical Condition (MA)   Reason for Exam: eval for cholecystitis   Acuity: Acute   Type of Exam: Initial       FINDINGS:   Lungs/pleura: The central airways are patent.  There are no suspicious   pulmonary nodules       Mediastinum: There is no acute mediastinal abnormality there is no evidence   of pulmonary embolism.       Soft Tissues/Bones: No suspicious osteolytic or osteoblastic lesions       Abdominal organs: The liver, spleen, adrenal glands, kidneys, and pancreas   demonstrate no acute abnormality. The gallbladder is unremarkable.       GI/Bowel: The visualized portions of the bowel are unremarkable.       Peritoneum/Retroperitoneum: Unremarkable       Bones: Osseous structures are better evaluated on separate CT of the lumbar   and thoracic spine.  There is a pathologic appearing fracture at T4.           Impression   1. No acute intrathoracic abnormality. 2. No acute intra-abdominal abnormality.         Narrative   EXAMINATION:   CT OF THE THORACIC SPINE WITHOUT CONTRAST; CT OF THE LUMBAR SPINE WITHOUT   CONTRAST  9/7/2021 2:10 pm:       TECHNIQUE:   CT of the thoracic spine was performed without the administration of   intravenous contrast. Multiplanar reformatted images are provided for review. Dose modulation, iterative reconstruction, and/or weight based adjustment of   the mA/kV was utilized to reduce the radiation dose to as low as reasonably   achievable.; CT of the lumbar spine was performed without the administration   of intravenous contrast. Multiplanar reformatted images are provided for   review.  Dose modulation, iterative reconstruction, and/or weight based   adjustment of the mA/kV was utilized to reduce the radiation dose to as low   as reasonably achievable.       COMPARISON:   None.       HISTORY:   ORDERING SYSTEM PROVIDED HISTORY: Eval for compression fx   TECHNOLOGIST PROVIDED HISTORY:   Reason for exam:->Eval for compression fx   Reason for Exam: eval for compression fx   Acuity: Acute   Type of Exam: Initial       FINDINGS:   BONES/ALIGNMENT: There is a diffuse moth-eaten appearance of the T4 vertebral   body with an associated compression fracture.  There is approximately 20%   central height loss.       Small lytic lesions at the inferior endplate of T9 and the superior endplate   of L4 are nonspecific.  These may well represent Schmorl's node deformities. However, given additional findings, metastatic disease is difficult to   exclude.       DEGENERATIVE CHANGES: No gross spinal canal stenosis or bony neural foraminal   narrowing of the thoracic spine.       SOFT TISSUES: No paraspinal mass is seen.           Impression   Findings consistent with metastatic disease and pathologic fracture at T4. The patient is likely a candidate for kyphoplasty and radiofrequency   ablation.  Interventional radiology consult is suggested.       Lucent lesions at T9 and L4 most likely represent Schmorl's node deformities,   however metastatic disease is difficult to exclude.         Narrative   EXAMINATION:   CT OF THE THORACIC SPINE WITHOUT CONTRAST; CT OF THE LUMBAR SPINE WITHOUT   CONTRAST  9/7/2021 2:10 pm:       TECHNIQUE:   CT of the thoracic spine was performed without the administration of   intravenous contrast. Multiplanar reformatted images are provided for review. Dose modulation, iterative reconstruction, and/or weight based adjustment of   the mA/kV was utilized to reduce the radiation dose to as low as reasonably   achievable.; CT of the lumbar spine was performed without the administration   of intravenous contrast. Multiplanar reformatted images are provided for   review.  Dose modulation, iterative reconstruction, and/or weight based   adjustment of the mA/kV was utilized to reduce the radiation dose to as low   as reasonably achievable.       COMPARISON:   None.       HISTORY:   ORDERING SYSTEM PROVIDED HISTORY: Eval for compression fx   TECHNOLOGIST PROVIDED HISTORY:   Reason for exam:->Eval for compression fx   Reason for Exam: eval for compression fx   Acuity: Acute   Type of Exam: Initial       FINDINGS:   BONES/ALIGNMENT: There is a diffuse moth-eaten appearance of the T4 vertebral   body with an associated compression fracture.  There is approximately 20%   central height loss.       Small lytic lesions at the inferior endplate of T9 and the superior endplate   of L4 are nonspecific.  These may well represent Schmorl's node deformities. However, given additional findings, metastatic disease is difficult to   exclude.       DEGENERATIVE CHANGES: No gross spinal canal stenosis or bony neural foraminal   narrowing of the thoracic spine.       SOFT TISSUES: No paraspinal mass is seen.           Impression   Findings consistent with metastatic disease and pathologic fracture at T4. The patient is likely a candidate for kyphoplasty and radiofrequency   ablation.  Interventional radiology consult is suggested.       Lucent lesions at T9 and L4 most likely represent Schmorl's node deformities,   however metastatic disease is difficult to exclude.         Narrative   EXAMINATION:   RIGHT UPPER QUADRANT ULTRASOUND       9/7/2021 2:55 pm       COMPARISON:   None.       HISTORY:   ORDERING SYSTEM PROVIDED HISTORY: Eval for cholelithiasis   TECHNOLOGIST PROVIDED HISTORY:       Reason for exam:->Eval for cholelithiasis   Acuity: Unknown   Type of Exam: Unknown       FINDINGS:   LIVER:  Deep portions of the liver are somewhat difficult to visualize.    Liver is heterogeneous in appearance.  There is diffuse increased   echogenicity without focal lesion or intrahepatic biliary ductal dilation of   the visualized liver.       BILIARY SYSTEM:  Gallbladder is unremarkable without evidence of   pericholecystic fluid, wall thickening or stones.  Negative sonographic   Payne's sign.       Common bile duct is within normal limits measuring 2 mm.       RIGHT KIDNEY: The right kidney is grossly unremarkable without evidence of   hydronephrosis.       PANCREAS:  Obscured by bowel gas.       OTHER: No evidence of right upper quadrant ascites.           Impression   Liver is heterogeneous in appearance and increased in echogenicity compatible   with diffuse hepatocellular disease, hepatic steatosis.  Subtle findings of   the liver are limited due to body habitus.  Please refer to CT performed same   day for further evaluation.       The gallbladder appears unremarkable in appearance.           IMPRESSION/RECOMMENDATIONS:    Active Problems:    Uncontrolled pain  Resolved Problems:    * No resolved hospital problems. *       64 year old male with a history of AAA, CAD, HTN and hyperlipidemia. He has:      1. Sepsis with lactic acidosis  -Receiving broad spectrum antibiotics. 2. Coffee-ground emesis  -GI following.  -Planning for EGD.   -Has mild anemia at present, monitor H&H.      3. 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  -Notified Dr. Carmen Gordon of admission. -ENT also consulted. 4. Lucent lesions of T9 and L4 spine  -? Pathologic fracture  -IR to evaluate for kyphoplasty, recommend biopsy at same time. 5. Intractable back pain  -Continue oxycodone PRN      This plan was discussed with the patient and he/she verbalized understanding. Thank you for allowing us to participate in the care of this patient. Amelia Guardado, [address] [facility]  Oncology Hematology [facility], [facility].  [phone]    Patient was seen and examined. Discussed with patient's wife by the bedside. Agree with above. If patient has to have kyphoplasty, will ask for biopsy. EGD results noted.   Meanwhile continue supportive care    John Jefferson MD

## 2021-09-08 NOTE — PROGRESS NOTES
Clinical Pharmacy Note: Pharmacy to Dose Vancomcyin    Vancomycin Day: 1  Dosing Method: Bayesian Modeling    Random: 6.3 mcg/mL    Recent Labs     09/07/21  1315 09/08/21  0429   BUN 20 21*       Recent Labs     09/07/21  1315 09/08/21  0429   CREATININE 1.1 0.7*       Recent Labs     09/07/21  1315 09/08/21  0429   WBC 21.4* 20.4*         Intake/Output Summary (Last 24 hours) at 9/8/2021 0755  Last data filed at 9/8/2021 8464  Gross per 24 hour   Intake 800 ml   Output 1050 ml   Net -250 ml         Ht Readings from Last 1 Encounters:   09/07/21 5' 9\" (1.753 m)        Wt Readings from Last 1 Encounters:   09/07/21 184 lb 1.6 oz (83.5 kg)         Body mass index is 27.19 kg/m². Estimated Creatinine Clearance: 90 mL/min (A) (based on SCr of 0.7 mg/dL (L)). Assessment/Plan:  Vancomycin level predicts therapeutic AUC. Level was drawn appropriately in respect to last dose given. Continue vancomycin regimen 1000mg IVPB every 12 hours. Bayesian Modeling predicts an AUC of 461 mg/L*hr and trough of 15.5 mg/L. A vancomycin trough will  be ordered in 5-7 days or sooner if renal function changes. Changes in regimen will be determined based on culture results, renal function, and clinical response. Pharmacy will continue to monitor and adjust regimen as necessary.     Thank you for the consult,    Bharathi Mcmanus PharmD  9/8/2021 7:57 AM

## 2021-09-08 NOTE — ANESTHESIA PRE PROCEDURE
IntraVENous Q12H Raymundo Marino MD   Stopped at 09/08/21 1156    pantoprazole (PROTONIX) 80 mg in sodium chloride 0.9 % 100 mL infusion  8 mg/hr IntraVENous Continuous Raymundo VILLASENOR MD        pantoprazole (PROTONIX) injection 80 mg  80 mg IntraVENous Once Uche VILLASENOR MD        ondansetron (ZOFRAN) injection 4 mg  4 mg IntraVENous Q6H PRN Raymundo VILLASENOR MD   4 mg at 09/08/21 0543    atorvastatin (LIPITOR) tablet 40 mg  40 mg Oral Nightly Raymundo VILLASENOR MD   40 mg at 09/07/21 2228    valsartan (DIOVAN) tablet 80 mg  80 mg Oral Daily Raymundo VILLASENOR MD   80 mg at 09/08/21 0848    sodium chloride flush 0.9 % injection 5-40 mL  5-40 mL IntraVENous 2 times per day Uche VILLASENOR MD   10 mL at 09/07/21 2219    sodium chloride flush 0.9 % injection 5-40 mL  5-40 mL IntraVENous PRN Raymundo VILLASENOR MD        0.9 % sodium chloride infusion  25 mL IntraVENous PRN Raymundo VILLASENOR MD        acetaminophen (TYLENOL) tablet 650 mg  650 mg Oral Q6H PRN Raymundo VILLASENOR MD        Or    acetaminophen (TYLENOL) suppository 650 mg  650 mg Rectal Q6H PRN Raymundo VILLASENOR MD        0.9 % sodium chloride infusion   IntraVENous Continuous Raymundo VILLASENOR  mL/hr at 09/07/21 2239 New Bag at 09/07/21 2239    cefepime (MAXIPIME) 2000 mg IVPB minibag  2,000 mg IntraVENous Q8H Raymundo VILLASENOR MD   Stopped at 09/08/21 1105    oxyCODONE (ROXICODONE) immediate release tablet 5 mg  5 mg Oral Q4H PRN Raymundo VILLASENOR MD   5 mg at 09/08/21 0846    Or    oxyCODONE (ROXICODONE) immediate release tablet 10 mg  10 mg Oral Q4H PRN Raymundo VILLASENOR MD   10 mg at 09/08/21 0007    morphine (PF) injection 2 mg  2 mg IntraVENous Q4H PRN Raymundo VILLASENOR MD   2 mg at 09/08/21 0233    metronidazole (FLAGYL) 500 mg in NaCl 100 mL IVPB premix  500 mg IntraVENous Q8H Raymundo Marino MD   Stopped at 09/08/21 0942    hydrALAZINE (APRESOLINE) injection 10 mg  10 mg IntraVENous Q6H PRN Raymundo VILLASENOR MD   10 mg at 09/08/21 1011    lidocaine 4 % external patch 1 patch  1 patch TransDERmal Daily Raymundo May MD        bisacodyl (DULCOLAX) EC tablet 5 mg  5 mg Oral Daily PRN Raymundo May MD           Allergies:     Allergies   Allergen Reactions    Fish Oil Anaphylaxis    Shellfish Allergy Anaphylaxis    Lisinopril      Other reaction(s): Cough       Problem List:    Patient Active Problem List   Diagnosis Code    Subacute pansinusitis J01.40    Nasal polyposis J33.9    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, 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    Abdominal aortic aneurysm (AAA) without rupture (HCC) I71.4    Uncontrolled pain R52       Past Medical History:        Diagnosis Date    [facility]) ([facility] [address]     CAD (coronary artery disease)     stents 2017    Cancer ([facility] [address]     Hyperlipidemia     Hypertension     Nasal bleeding     worse since covid making the mask mandatory       Past Surgical History:        Procedure Laterality Date    CARDIAC SURGERY  2017    stents placed     COLONOSCOPY      SINUS ENDOSCOPY N/A 11/2/2020    FUNCTIONAL ENDOSCOPIC SINUS SURGERY performed by Ana Sanchez MD at [address] [place] [address] 4/12/2021    RIGHT FUNCTIONAL ENDOSCOPIC SINUS SURGERY (72[address], [address], [id number]) performed by Ana Sanchez MD at [facility]       Social History:    Social History     Tobacco Use    Smoking status: Never Smoker    Smokeless tobacco: Never Used   Substance Use Topics    Alcohol use: Never                                Counseling given: Not Answered      Vital Signs (Current):   Vitals:    09/08/21 0544 09/08/21 0800 09/08/21 1005 09/08/21 1100   BP: (!) 150/97 (!) 171/97 (!) 168/95 134/79   Pulse: 84 86     Resp:  24     Temp:  97.9 °F (36.6 °C)     TempSrc:  Oral     SpO2:  96%     Weight:       Height:                                                  BP Readings from Last 3 Encounters:   09/08/21 134/79   08/31/21 132/80   08/23/21 108/76       NPO Status: BMI:   Wt Readings from Last 3 Encounters:   09/07/21 184 lb 1.6 oz (83.5 kg)   08/31/21 185 lb (83.9 kg)   06/01/21 197 lb (89.4 kg)     Body mass index is 27.19 kg/m². CBC:   Lab Results   Component Value Date    WBC 20.4 09/08/2021    RBC 4.52 09/08/2021    HGB 12.6 09/08/2021    HCT 37.4 09/08/2021    MCV 82.6 09/08/2021    RDW 13.4 09/08/2021     09/08/2021       CMP:   Lab Results   Component Value Date     09/08/2021    K 4.3 09/08/2021    K 4.2 09/07/2021     09/08/2021    CO2 21 09/08/2021    BUN 21 09/08/2021    CREATININE 0.7 09/08/2021    CREATININE 0.9 07/02/2020    GFRAA >60 09/08/2021    AGRATIO 1.2 09/07/2021    LABGLOM >60 09/08/2021    GLUCOSE 196 09/08/2021    PROT 8.0 09/07/2021    CALCIUM 9.1 09/08/2021    BILITOT 0.6 09/07/2021    ALKPHOS 128 09/07/2021    AST 18 09/07/2021    ALT 22 09/07/2021       POC Tests: No results for input(s): POCGLU, POCNA, POCK, POCCL, POCBUN, POCHEMO, POCHCT in the last 72 hours.     Coags:   Lab Results   Component Value Date    PROTIME 12.8 09/07/2021    INR 1.13 09/07/2021    APTT 31.6 09/07/2021       HCG (If Applicable): No results found for: PREGTESTUR, PREGSERUM, HCG, HCGQUANT     ABGs: No results found for: PHART, PO2ART, TYK5FHM, TNA8CGU, BEART, M7SUHWBC     Type & Screen (If Applicable):  No results found for: LABABO, LABRH    Drug/Infectious Status (If Applicable):  No results found for: HIV, HEPCAB    COVID-19 Screening (If Applicable):   Lab Results   Component Value Date    COVID19 Not Detected 04/08/2021           Anesthesia Evaluation  Patient summary reviewed and Nursing notes reviewed no history of anesthetic complications:   Airway: Mallampati: II  TM distance: >3 FB   Neck ROM: full  Mouth opening: > = 3 FB Dental: normal exam         Pulmonary:   (+) recent URI:      (-) asthma and shortness of breath                           Cardiovascular:    (+) hypertension:, CAD:, CABG/stent: no interval change,     (-)  angina                Neuro/Psych:      (-) CVA           GI/Hepatic/Renal:        (-) GERD and liver disease       Endo/Other:    (+) malignancy/cancer. (-) diabetes mellitus, hypothyroidism               Abdominal:             Vascular:     - PVD. Other Findings:             Anesthesia Plan      MAC     ASA 3 - emergent     (Severe sepsis w/ lactic acidosis, possible URI)  Induction: intravenous. Anesthetic plan and risks discussed with patient. Plan discussed with CRNA.                   Luna Ortiz MD   9/8/2021

## 2021-09-08 NOTE — PROGRESS NOTES
Pt was seen by nursing student for shift assessment. Pt complained of lower abdominal pain when urinating, reports feeling \"swollen\" and \"numb\" near groin. Pt able to void, however, states it \"feels like it is not coming. \" Pt reports \"some\" nausea, but states he could keep the medications down. Medications administered and Pt's nausea increased. Pt encouraged to take slow, deep breaths. Pt bed locked and in lowest position. Call light is within reach and Pt encouraged to call for any needs.

## 2021-09-08 NOTE — CONSULTS
Gastroenterology Consult Note        Patient: Gianna Cano  : 1945  Acct#:      Date:  2021    Subjective:       History of Present Illness  Patient is a 67 y.o.  male admitted with Septicemia ([facility] [address] [A41.9]  Hypertensive urgency [I16.0]  Uncontrolled pain [R52]  Pathologic thoracic fracture, initial encounter [X07.68CU] who is seen in consult for hematemesis. 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 Patient presented with severe mid back pain. Was found to have T4 compression fracture concerning for metastatic disease. Also felt to have sepsis possibly from sinusitis. Has been on antibiotics and prednisone outpatient. Currently getting oxycodone for back pain. Patient began having nausea today and had coffee-ground emesis x1. Denies abdominal pain. No melena or hematochezia. Last bowel movement was 1 week ago. Does have history of nosebleeds but none after last treatment in . No NSAIDs. Past Medical History:   Diagnosis Date    AAA (abdominal aortic aneurysm) ([facility] [address]     CAD (coronary artery disease)     stents 2017    Cancer ([facility] [address]     Hyperlipidemia     Hypertension     Nasal bleeding     worse since covid making the mask mandatory      Past Surgical History:   Procedure Laterality Date    CARDIAC SURGERY  2017    stents placed     COLONOSCOPY      SINUS ENDOSCOPY N/A 2020    FUNCTIONAL ENDOSCOPIC SINUS SURGERY performed by Sandra Tineo MD at [address] [place] [address] Right 2021    RIGHT FUNCTIONAL ENDOSCOPIC SINUS SURGERY (00774, 78455, [id number]) performed by Sandra Tineo MD at [facility]      Past Endoscopic History    Admission Meds  No current facility-administered medications on file prior to encounter.      Current Outpatient Medications on File Prior to Encounter   Medication Sig Dispense Refill    montelukast (SINGULAIR) 10 MG tablet TAKE 1 TABLET BY MOUTH EVERY NIGHT 90 tablet 3    atorvastatin (LIPITOR) 40 MG tablet Take 40 mg by mouth nightly       valsartan (DIOVAN) 80 MG tablet Take 80 mg by mouth daily      Multiple Vitamin (MULTIVITAMIN) tablet Take 1 tablet by mouth daily       Magnesium 400 MG CAPS Take 400 mg by mouth daily       vitamin D-3 (CHOLECALCIFEROL) 125 MCG (5000 UT) TABS Take by mouth      vitamin C (ASCORBIC ACID) 500 MG tablet Take 500 mg by mouth daily       methylPREDNISolone (MEDROL, YANA,) 4 MG tablet Take 1 tablet by mouth See Admin Instructions Take by mouth. (Patient not taking: Reported on 6/1/2021) 1 kit 0    methylPREDNISolone (MEDROL, YANA,) 4 MG tablet Take 1 tablet by mouth See Admin Instructions Take by mouth. (Patient not taking: Reported on 5/25/2021) 1 kit 0    levoFLOXacin (LEVAQUIN) 500 MG tablet Take 1 tablet by mouth daily (Patient not taking: Reported on 6/29/2021) 10 tablet 0       Patient denies ASA, NSAID use.     Allergies  Allergies   Allergen Reactions    Fish Oil Anaphylaxis    Shellfish Allergy Anaphylaxis    Lisinopril      Other reaction(s): Cough      Social   Social History     Tobacco Use    Smoking status: Never Smoker    Smokeless tobacco: Never Used   Substance Use Topics    Alcohol use: Never        Family History   Problem Relation Age of Onset    Other Brother         anurysim           Review of Systems  Constitutional: negative for fevers, chills, sweats    Ears, nose, mouth, throat, and face: negative for nasal congestion and sore throat   Respiratory: negative for cough and shortness of breath   Cardiovascular: negative for chest pain and dyspnea   Gastrointestinal: see hpi   Genitourinary:negative for dysuria and frequency   Integument/breast: negative for pruritus and rash   Hematologic/lymphatic: negative for bleeding and easy bruising   Musculoskeletal:negative for arthralgias and myalgias   Neurological: negative for dizziness and weakness         Physical Exam  Blood pressure 134/79, pulse 86, temperature 97.9 °F (36.6 °C), temperature source Oral, resp. rate 24, height 5' 9\" (1.753 m), weight 184 lb 1.6 oz (83.5 kg), SpO2 96 %. General appearance: alert, cooperative, no distress, appears stated age  Eyes: Anicteric  Head: Normocephalic, without obvious abnormality  Lungs: clear to auscultation bilaterally, Normal Effort  Heart: regular rate and rhythm, normal S1 and S2, no murmurs or rubs  Abdomen: soft, non-tender. Bowel sounds normal. No masses,  no organomegaly. Extremities: atraumatic, no cyanosis or edema  Skin: warm and dry, no jaundice  Neuro: Grossly intact, A&OX3  Musculoskeletal: 5/5  strength BUE      Data Review:    Recent Labs     09/07/21  1315 09/08/21  0429   WBC 21.4* 20.4*   HGB 13.9 12.6*   HCT 42.0 37.4*   MCV 84.8 82.6    332     Recent Labs     09/07/21  1315 09/08/21  0429   * 132*   K 4.2 4.3   CL 98* 100   CO2 20* 21   PHOS  --  2.1*   BUN 20 21*   CREATININE 1.1 0.7*     Recent Labs     09/07/21  1315   AST 18   ALT 22   BILITOT 0.6   ALKPHOS 128     Recent Labs     09/07/21  1315   LIPASE 19.0   AMYLASE 61     Recent Labs     09/07/21  1315   PROTIME 12.8*   INR 1.13*     No results for input(s): PTT in the last 72 hours. No results for input(s): OCCULTBLD in the last 72 hours. Imaging Studies:                            Ultrasound 9/7/21  Impression   Liver is heterogeneous in appearance and increased in echogenicity compatible   with diffuse hepatocellular disease, hepatic steatosis.  Subtle findings of   the liver are limited due to body habitus.  Please refer to CT performed same   day for further evaluation.       The gallbladder appears unremarkable in appearance.         CTA chest, abd, pelvis 9/7/21     FINDINGS:   Lungs/pleura:  The central airways are patent.  There are no suspicious   pulmonary nodules       Mediastinum: There is no acute mediastinal abnormality there is no evidence   of pulmonary embolism.       Soft Tissues/Bones: No radiotherapy. Patient had severe back pain for the past week. He was seen at [facility] and initially thought to have cardiac issues. However, cardiac work-up was negative. He presented to the ER at at [facility]. CT revealed pathologic fracture at T4, lucent lesions at the 9 and L4. In the hospital, patient had sudden onset abdominal pain, nausea, vomiting and hematemesis. GI was consulted for evaluation. Patient has a history of nosebleed but none since June. Vital signs are stable abdomen is soft nontender no rebound or guarding  Impression:   1. Hematemesis could be from upper GI bleed or from swallowed blood from epistaxis. Patient felt oral contrast from CT made his stomach upset  2. Leukocytosis/sepsis, on IV antibiotics  Plan: IV PPI, monitor H&H, transfuse to keep hemoglobin above 7, EGD later today.       Arnoldo Lloyd MD, MSc  [facility]  09/08/21 36.5

## 2021-09-08 NOTE — CONSULTS
Palliative Care:   Admitted 9/7/21 and Palliative consult placed. 67 y. o. 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  Patient states that the pain has been there for the past several weeks.  Has been addressed with PMD who was concerned for cardiac etiology.  Patient was seen at [facility] on 8/31/2021 at which time he was evaluated with EKG troponins CT chest and nuclear stress test which did not reveal etiology.  Patient was also supposed to get gallbladder ultrasound to rule out biliary etiology of this pain however opted to do it outpatient. Ki Griggs presents today to [facility] ER again with intractable back pain.  Muscle relaxants prescribed at [facility] are not helping.  Per patient spouse have all supply of pain medications from back when he had surgeries and has been managing with taking 1 pill a day.  At the ED today CT chest shows metastatic disease and pathologic fracture of T4.  Lucent lesions at T9 and L4 are also noted.  Patient with marked leukocytosis of 21,000 with elevated lactate at 4.1 with resulting metabolic acidosis with anion gap with respiratory compensation. Admitted 9/7/21 and Palliative consult placed. Past Medical History:   has a past medical history of AAA (abdominal aortic aneurysm) ([facility] [address] CAD (coronary artery disease), Cancer ([facility] [address] Hyperlipidemia, Hypertension, and Nasal bleeding. Past Surgical History:   has a past surgical history that includes Cardiac surgery (2017); Colonoscopy; Sinus endoscopy (N/A, 11/2/2020); and Sinus endoscopy (Right, 4/12/2021).     Advance Directives:  DNR-CCA/No intubation        Problem Severity: Pain/Other Symptoms:     T4 pathologic fracture     Bed Mobility/Toileting/Transfer:    PT/OT recommend C  Remains with pain control issues due to fracture T4 (pathological)    Performance Status:        Palliative Performance Scale:  100% []Full Normal activity & work No evidence of disease  90%   [] Full Normal activity & work Some evidence of disease  80%   [] Full Normal activity with Effort Some evidence of disease  70%   [] Reduced Unable Normal Job/Work Significant disease Full Normal or reduced  60%   [x] Ambulation reduced; Significant disease; Can't do hobbies/housework; intake normal   or reduced; occasional assist; LOC full/confusion  50%   [] Mainly sit/lie; Extensive disease; Can't do any work; Considerable assist; intake normal  Or reduced; LOC full/confusion  40%   [] Mainly in bed; Extensive disease; Mainly assist; intake normal or reduced; occasional assist; LOC full/confusion  30%   [] Bed Bound; Extensive disease; Total care; intake reduced; LOC full/confusion  20%   [] Bed Bound; Extensive disease; Total care; intake minimal; Drowsy/coma  10%   [] Bed Bound; Extensive disease; Total care; Mouth care only; Drowsy/coma    PPS 60%    Symptom Assessment: Appetite/Nausea/Bowels/Fatigue:    lbs. Social History:   reports that he has never smoked. He has never used smokeless tobacco. He reports that he does not drink alcohol and does not use drugs. Family History:  family history includes Other in his brother. Psychological/Spiritual:     . One son. Non-Zoroastrianism.     Family Discussion:    Patient sitting up in chair. Tolerated PT this AM. Admits to certain positioning with back will exacerbate pain in thoracic area. Patient shared history of events prior to admit. Is in hopes the kyphoplasty will take place prior to discharge. Per Oncology this will be performed once infection is resolved and at such time will obtain biopsy from spine to rule out metastatic disease. Patient wishes are DNR-CCA/NO INTUBATION as of 9/7/21 discussions with Dr. Lenny Avery. ACP in place.      Patient with strong Anglican

## 2021-09-08 NOTE — PROGRESS NOTES
Patient arrived from ENDO to PACU spot 6. Attached to monitoring system. Report received per CRNA and ENDO nurse. BP upon arrival is slightly low at 87/55. Increased fluids at this time. No problems reported during procedure. Status stable.

## 2021-09-08 NOTE — PROGRESS NOTES
Patient doing well. No complaints of pain, status stable. FLoor notified of patients return to room. 7668.

## 2021-09-08 NOTE — BRIEF OP NOTE
Brief Postoperative Note      Patient: Vj Cook  YOB: 1945  MRN: [id number]    Date of Procedure: 9/8/2021    Pre-Op Diagnosis: Nausea        Procedure(s):  EGD GASTRIC BIOPSY    Surgeon(s):  Nyra Saint, MD    Anesthesia: Monitor Anesthesia Care    Estimated Blood Loss (mL): Minimal    Complications: None    Specimens:   ID Type Source Tests Collected by Time Destination   A : GASTRIC BIOPSY FOR HPYLORI  Tissue Stomach SURGICAL PATHOLOGY Nyra Saint, MD 9/8/2021 5353      Findings:   Mild erosive esophagitis. 2 cm HH. Patchy gastritis, biopsied. Coffee ground material with semi-digested food in fundus, limiting endoscopic evaluation. ? swallowed blood from epistaxis     Plans  Await bx. Start clears, Monitor H/H. IV PPI BID, can switch to oral PPI daily when tolerating PO.      Electronically signed by Nyra Saint, MD on 9/8/2021 at 2:55 PM

## 2021-09-08 NOTE — ANESTHESIA POSTPROCEDURE EVALUATION
Department of Anesthesiology  Postprocedure Note    Patient: Keron Stone  MRN: [id number]  YOB: 1945  Date of evaluation: 9/8/2021  Time:  2:56 PM     Procedure Summary     Date: 09/08/21 Room / Location: [address] [place] [address]    Anesthesia Start: 3159 Anesthesia Stop: 4896    Procedure: EGD GASTRIC BIOPSY (N/A Abdomen) Diagnosis: (Nausea)    Surgeons: Blessing White MD Responsible Provider: Paulina Rodriguez MD    Anesthesia Type: MAC ASA Status: 3 - Emergent          Anesthesia Type: MAC    Margaret Phase I: Margaret Score: 10    Margaret Phase II:      Last vitals: Reviewed and per EMR flowsheets.        Anesthesia Post Evaluation    Patient location during evaluation: PACU  Level of consciousness: awake  Airway patency: patent  Complications: no  Cardiovascular status: hemodynamically stable  Respiratory status: acceptable

## 2021-09-08 NOTE — CONSULTS
[facility]      Patient Name: Emiliano Smalls  Medical Record Number:  [id number]  Primary Care Physician:  Chari Carnes MD  Date of Consultation: 9/8/2021    Chief Complaint: Upper back pain      HISTORY OF PRESENT ILLNESS  Vonna Gosselin is a(n) 67 y.o. male with a history of right sinonasal squamous cell carcinoma status post functional endoscopic sinus surgery on 11/2/2020 on 4/12/2021, status post IMRT radiation completed on 7/14/2021. Admitted 9/7 for intractable mid upper back pain. CT the chest demonstrated metastatic disease and pathological fracture of T4 and lucent osteitic lesions at T9 and L4. ENG today demonstrated \"Mild erosive esophagitis. 2 cm HH. Patchy gastritis, biopsied. Coffee ground material with semi-digested food in fundus, limiting endoscopic evaluation. ? swallowed blood from epistaxis. \"    Wife states that he has been using the medicated sinonasal rinses since his last visit with me on 8/23. He has not used them for the last couple days. Ever since he finished his radiation in mid July he has not had any nosebleeds. Continues to have crusting from his nose. He started having upper back pain approximately 1 week ago. Went to his PCP who told him to go to [facility] where his cardiologist is to be evaluated for heart issues. He was at [facility] for approximately 3 days but left because he was in pain and they were not doing anything for him.       Patient Active Problem List   Diagnosis    Subacute pansinusitis    Nasal polyposis    Cancer of nasal cavity and sinus (HCC)    Abdominal aortic aneurysm (AAA) without rupture (HCC)    Uncontrolled pain     Past Surgical History:   Procedure Laterality Date    CARDIAC SURGERY  2017    stents placed     COLONOSCOPY      SINUS ENDOSCOPY N/A 11/2/2020    FUNCTIONAL ENDOSCOPIC SINUS SURGERY performed by Rosendo Liz MD at [address] [place] [address] 4/12/2021    RIGHT FUNCTIONAL ENDOSCOPIC SINUS SURGERY (72369, 27319, 12091) performed by Eduin Alves MD at [address] [place] [address] N/A 9/8/2021    EGD GASTRIC BIOPSY performed by Donte Martinez MD at [address] ENDOSCOPY     Family History   Problem Relation Age of Onset    Other Brother         anurysim     Social History     Socioeconomic History    Marital status:      Spouse name: Not on file    Number of children: Not on file    Years of education: Not on file    Highest education level: Not on file   Occupational History    Not on file   Tobacco Use    Smoking status: Never Smoker    Smokeless tobacco: Never Used   Vaping Use    Vaping Use: Never used   Substance and Sexual Activity    Alcohol use: Never    Drug use: Never    Sexual activity: Not on file   Other Topics Concern    Not on file   Social History Narrative    Not on file     Social Determinants of Health     Financial Resource Strain: Low Risk     Difficulty of Paying Living Expenses: Not hard at all   Food Insecurity: No Food Insecurity    Worried About Running Out of Food in the Last Year: Never true    [address] in the Last Year: Never true   Transportation Needs:     Lack of Transportation (Medical):      Lack of Transportation (Non-Medical):    Physical Activity:     Days of Exercise per Week:     Minutes of Exercise per Session:    Stress:     Feeling of Stress :    Social Connections:     Frequency of Communication with Friends and Family:     Frequency of Social Gatherings with Friends and Family:     Attends L[employer] day Services:     Active Member of Clubs or Organizations:     Attends Club or Organization Meetings:     Marital Status:    Intimate Partner Violence:     Fear of Current or Ex-Partner:     Emotionally Abused:     Physically Abused:     Sexually Abused:        DRUG/FOOD ALLERGIES: Fish oil, Shellfish allergy, and Lisinopril    CURRENT MEDICATIONS  Prior to Admission medications    Medication Sig Start Date End Date Taking? Authorizing Provider   montelukast (SINGULAIR) 10 MG tablet TAKE 1 TABLET BY MOUTH EVERY NIGHT 9/7/21  Yes Judith Peck DO   atorvastatin (LIPITOR) 40 MG tablet Take 40 mg by mouth nightly  10/29/19  Yes Historical Provider, MD   valsartan (DIOVAN) 80 MG tablet Take 80 mg by mouth daily 7/1/20  Yes Historical Provider, MD   Multiple Vitamin (MULTIVITAMIN) tablet Take 1 tablet by mouth daily    Yes Historical Provider, MD   Magnesium 400 MG CAPS Take 400 mg by mouth daily    Yes Historical Provider, MD   vitamin D-3 (CHOLECALCIFEROL) 125 MCG (5000 UT) TABS Take by mouth   Yes Historical Provider, MD   vitamin C (ASCORBIC ACID) 500 MG tablet Take 500 mg by mouth daily    Yes Historical Provider, MD   methylPREDNISolone (MEDROL, YANA,) 4 MG tablet Take 1 tablet by mouth See Admin Instructions Take by mouth. Patient not taking: Reported on 6/1/2021 4/13/21   Sarah Clay MD   methylPREDNISolone (MEDROL, YANA,) 4 MG tablet Take 1 tablet by mouth See Admin Instructions Take by mouth.   Patient not taking: Reported on 5/25/2021 4/12/21   Sarah Clay MD   levoFLOXacin [facility]) 500 MG tablet Take 1 tablet by mouth daily  Patient not taking: Reported on 6/29/2021 4/6/21   Sarah Clay MD       REVIEW OF SYSTEMS  The following systems were reviewed and revealed the following in addition to any already discussed in the HPI:    CONSTITUTIONAL: no weight loss, no fever, no night sweats, no chills  EYES: no vision changes, no blurry vision  EARS: + bilateral hearing loss, no otalgia  NOSE: no epistaxis, no rhinorrhea  RESPIRATORY: no difficulty breathing, no shortness of breath  CV: no chest pain, no lower extremity swelling  HEME: no coagulation disorder, no known bleeding disorders  NEURO: no TIA or stroke-like symptoms  SKIN: no new rashes in the head and neck, no recently diagnosed skin cancers  MOUTH: no new oral ulcers, no recent tooth infections  GASTROINTESTINAL: no diarrhea, +stomach pain, + nausea  PSYCH: no anxiety, no depression    PHYSICAL EXAM  /75   Pulse 88   Temp 98.1 °F (36.7 °C) (Temporal)   Resp 23   Ht 5' 9\" (1.753 m)   Wt 184 lb 1.6 oz (83.5 kg)   SpO2 96%   BMI 27.19 kg/m²     GENERAL: No acute distress, alert and oriented, no hoarseness, on supplemental oxygen via nasal cannula, appears weak  EYES: EOMI, Anti-icteric  NOSE:  On anterior rhinoscopy nasal septum severely deviated to the left. Upon looking in the right nasal passage there is a dense amount of nasal crusting. No active epistaxis. EARS: Normal external appearance, some nose hearing loss noted on examination but able to answer questions effectively. No otorrhea. Hearing aids are not in place. FACE: HB 1/6 bilaterally, symmetric appearing, sensation equal bilaterally  ORAL CAVITY: No masses or lesions visualized or palpated, uvula is midline, moist mucous membranes. No active bleeding or blood clots in the oropharynx. NECK: Normal range of motion, no thyromegaly, trachea is midline, no palpable lymphadenopathy or neck masses, no crepitus  CHEST: Normal respiratory effort, breathing comfortably, no retractions  SKIN: No rashes, normal appearing skin, no evidence of skin lesions/tumors  NEURO: Cranial Nerves 2, 3, 4, 5, 6, 7, 11, 12 grossly intact bilaterally     I have performed a head and neck physical exam personally or was physically present during the key or critical portions of the service. LABS  Component Value Ref Range & Units Status Collected Lab   Hemoglobin 12.6Low   13.5 - 17.5 g/dL Final 09/08/2021  6:23 PM [address] Lab   Hematocrit 39. 0Low   40.5 - 52.5 % Final 09/08/2021  6:23 PM [address] Lab       Component Value Ref Range & Units Status Collected Lab   WBC 20.4High   4.0 - 11.0 K/uL Final 09/08/2021  4:29 AM [facility]   RBC 4.52  4.20 - 5.90 M/uL Final 09/08/2021  4:29 AM [facility] Lab   Hemoglobin 12.6Low   13.5 - 17.5 g/dL Final 09/08/2021  4:29 AM - [facility]   Hematocrit 37.4Low   40.5 - 52.5 % Final 09/08/2021  4:29 AM - [facility]   MCV 82.6  80.0 - 100.0 fL Final 09/08/2021  4:29 AM - [facility]   MCH 27.9  26.0 - 34.0 pg Final 09/08/2021  4:29 AM [facility]   MCHC 33.8  31.0 - 36.0 g/dL Final 09/08/2021  4:29 AM [facility]   RDW 13.4  12.4 - 15.4 % Final 09/08/2021  4:29 AM - [facility]   Platelets 770  619 - 450 K/uL Final 09/08/2021  4:29 AM [address] Lab   MPV 9.3  5.0 - 10.5 fL Final 09/08/2021  4:29 AM - [facility]   Neutrophils % 87.2  % Final 09/08/2021  4:29 AM - [facility]   Lymphocytes % 3.5  % Final 09/08/2021  4:29 AM - [facility]   Monocytes % 8.7  % Final 09/08/2021  4:29 AM - [facility]   Eosinophils % 0.0  % Final 09/08/2021  4:29 AM - [facility]   Basophils % 0.6  % Final 09/08/2021  4:29 AM - [facility]   Neutrophils Absolute 17. 8High   1.7 - 7.7 K/uL Final 09/08/2021  4:29 AM - [facility]   Lymphocytes Absolute 0.7Low   1.0 - 5.1 K/uL Final 09/08/2021  4:29 AM - [facility]   Monocytes Absolute 1.8High   0.0 - 1.3 K/uL Final 09/08/2021  4:29 AM - [facility]   Eosinophils Absolute 0.0  0.0 - 0.6 K/uL Final 09/08/2021  4:29 AM [facility] Lab   Basophils Absolute 0.1  0.0 - 0.2 K/uL Final 09/08/2021  4:29 AM [facility]            RADIOLOGY  EXAMINATION:   CT OF THE THORACIC SPINE WITHOUT CONTRAST; CT OF THE LUMBAR SPINE WITHOUT   CONTRAST  9/7/2021 2:10 pm:       TECHNIQUE:   CT of the thoracic spine was performed without the administration of   intravenous contrast. Multiplanar reformatted images are provided for review.    Dose modulation, iterative reconstruction, and/or weight based adjustment of   the mA/kV was utilized to reduce the radiation dose to as low as reasonably   achievable.; CT of the lumbar spine was performed without the administration   of intravenous contrast. Multiplanar reformatted images are provided for   review. Dose modulation, iterative reconstruction, and/or weight based   adjustment of the mA/kV was utilized to reduce the radiation dose to as low   as reasonably achievable.       COMPARISON:   None.       HISTORY:   ORDERING SYSTEM PROVIDED HISTORY: Eval for compression fx   TECHNOLOGIST PROVIDED HISTORY:   Reason for exam:->Eval for compression fx   Reason for Exam: eval for compression fx   Acuity: Acute   Type of Exam: Initial       FINDINGS:   BONES/ALIGNMENT: There is a diffuse moth-eaten appearance of the T4 vertebral   body with an associated compression fracture.  There is approximately 20%   central height loss.       Small lytic lesions at the inferior endplate of T9 and the superior endplate   of L4 are nonspecific.  These may well represent Schmorl's node deformities. However, given additional findings, metastatic disease is difficult to   exclude.       DEGENERATIVE CHANGES: No gross spinal canal stenosis or bony neural foraminal   narrowing of the thoracic spine.       SOFT TISSUES: No paraspinal mass is seen.           Impression   Findings consistent with metastatic disease and pathologic fracture at T4. The patient is likely a candidate for kyphoplasty and radiofrequency   ablation.  Interventional radiology consult is suggested.       Lucent lesions at T9 and L4 most likely represent Schmorl's node deformities,   however metastatic disease is difficult to exclude. EXAMINATION:   CT OF THE THORACIC SPINE WITHOUT CONTRAST; CT OF THE LUMBAR SPINE WITHOUT   CONTRAST  9/7/2021 2:10 pm:       TECHNIQUE:   CT of the thoracic spine was performed without the administration of   intravenous contrast. Multiplanar reformatted images are provided for review.    Dose modulation, iterative reconstruction, and/or weight based adjustment of   the mA/kV was utilized to reduce the radiation dose to as low as reasonably   achievable.; CT of the lumbar spine was performed without the administration   of intravenous contrast. Multiplanar reformatted images are provided for   review. Dose modulation, iterative reconstruction, and/or weight based   adjustment of the mA/kV was utilized to reduce the radiation dose to as low   as reasonably achievable.       COMPARISON:   None.       HISTORY:   ORDERING SYSTEM PROVIDED HISTORY: Eval for compression fx   TECHNOLOGIST PROVIDED HISTORY:   Reason for exam:->Eval for compression fx   Reason for Exam: eval for compression fx   Acuity: Acute   Type of Exam: Initial       FINDINGS:   BONES/ALIGNMENT: There is a diffuse moth-eaten appearance of the T4 vertebral   body with an associated compression fracture.  There is approximately 20%   central height loss.       Small lytic lesions at the inferior endplate of T9 and the superior endplate   of L4 are nonspecific.  These may well represent Schmorl's node deformities. However, given additional findings, metastatic disease is difficult to   exclude.       DEGENERATIVE CHANGES: No gross spinal canal stenosis or bony neural foraminal   narrowing of the thoracic spine.       SOFT TISSUES: No paraspinal mass is seen.           Impression   Findings consistent with metastatic disease and pathologic fracture at T4. The patient is likely a candidate for kyphoplasty and radiofrequency   ablation.  Interventional radiology consult is suggested.       Lucent lesions at T9 and L4 most likely represent Schmorl's node deformities,   however metastatic disease is difficult to exclude.      EXAMINATION:   CT OF THE ABDOMEN AND PELVIS WITH CONTRAST; CTA OF THE CHEST WITH AND WITHOUT   CONTRAST 9/7/2021 2:12 pm       TECHNIQUE:   CT of the abdomen and pelvis was performed with the administration of   intravenous contrast. Multiplanar reformatted images are provided for review. Dose modulation, iterative reconstruction, and/or weight based adjustment of   the mA/kV was utilized to reduce the radiation dose to as low as reasonably   achievable.; CTA of the chest was performed before and after the   administration of intravenous contrast.  Multiplanar reformatted images are   provided for review.  MIP images are provided for review. Dose modulation,   iterative reconstruction, and/or weight based adjustment of the mA/kV was   utilized to reduce the radiation dose to as low as reasonably achievable.       COMPARISON:   None.       HISTORY:   ORDERING SYSTEM PROVIDED HISTORY: Veronique;l for cholecystitis   TECHNOLOGIST PROVIDED HISTORY:   Additional Contrast?->None   Reason for exam:->Veronique;l for cholecystitis   Decision Support Exception - unselect if not a suspected or confirmed   emergency medical condition->Emergency Medical Condition (MA)   Reason for Exam: eval for cholecystitis   Acuity: Acute   Type of Exam: Initial       FINDINGS:   Lungs/pleura: The central airways are patent.  There are no suspicious   pulmonary nodules       Mediastinum: There is no acute mediastinal abnormality there is no evidence   of pulmonary embolism.       Soft Tissues/Bones: No suspicious osteolytic or osteoblastic lesions       Abdominal organs: The liver, spleen, adrenal glands, kidneys, and pancreas   demonstrate no acute abnormality. The gallbladder is unremarkable.       GI/Bowel: The visualized portions of the bowel are unremarkable.       Peritoneum/Retroperitoneum: Unremarkable       Bones: Osseous structures are better evaluated on separate CT of the lumbar   and thoracic spine.  There is a pathologic appearing fracture at T4.           Impression   1. No acute intrathoracic abnormality. 2. No acute intra-abdominal abnormality.      EXAMINATION:   CT OF THE ABDOMEN AND PELVIS WITH CONTRAST; CTA OF THE CHEST WITH AND WITHOUT   CONTRAST 9/7/2021 2:12 pm       TECHNIQUE:   CT of the abdomen and pelvis was performed with the administration of   intravenous contrast. Multiplanar reformatted images are provided for review. Dose modulation, iterative reconstruction, and/or weight based adjustment of   the mA/kV was utilized to reduce the radiation dose to as low as reasonably   achievable.; CTA of the chest was performed before and after the   administration of intravenous contrast.  Multiplanar reformatted images are   provided for review.  MIP images are provided for review. Dose modulation,   iterative reconstruction, and/or weight based adjustment of the mA/kV was   utilized to reduce the radiation dose to as low as reasonably achievable.       COMPARISON:   None.       HISTORY:   ORDERING SYSTEM PROVIDED HISTORY: Veronique;l for cholecystitis   TECHNOLOGIST PROVIDED HISTORY:   Additional Contrast?->None   Reason for exam:->Veronique;l for cholecystitis   Decision Support Exception - unselect if not a suspected or confirmed   emergency medical condition->Emergency Medical Condition (MA)   Reason for Exam: eval for cholecystitis   Acuity: Acute   Type of Exam: Initial       FINDINGS:   Lungs/pleura: The central airways are patent.  There are no suspicious   pulmonary nodules       Mediastinum: There is no acute mediastinal abnormality there is no evidence   of pulmonary embolism.       Soft Tissues/Bones: No suspicious osteolytic or osteoblastic lesions       Abdominal organs: The liver, spleen, adrenal glands, kidneys, and pancreas   demonstrate no acute abnormality. The gallbladder is unremarkable.       GI/Bowel: The visualized portions of the bowel are unremarkable.       Peritoneum/Retroperitoneum: Unremarkable       Bones: Osseous structures are better evaluated on separate CT of the lumbar   and thoracic spine.  There is a pathologic appearing fracture at T4.           Impression   1. No acute intrathoracic abnormality. 2. No acute intra-abdominal abnormality. EXAMINATION:   Skull base to midthigh PET/CT       12/9/2020       TECHNIQUE:   Following IV injection of 15.7 mCi of F 18 -FDG, PET  tumor imaging was   acquired from the base of the skull to the mid thighs.  Computed tomography   was used for purposes of attenuation correction and anatomic localization. Fusion imaging was utilized for interpretation.       Uptake time 54 mins.  Glucose level 141 mg/dl.       COMPARISON:   CT sinus dated 10/16/2020       HISTORY:   ORDERING SYSTEM PROVIDED HISTORY: Cancer of maxillary sinus ([facility] [address]   TECHNOLOGIST PROVIDED HISTORY:   Type of Exam: Initial       FINDINGS:   HEAD/NECK: There is persistent though decreased nodular soft tissue   thickening within the right nasal passage as compared to prior exam dated   10/16/2020.  A focus of soft tissue thickening on the current exam anteriorly   measures 1.0 x 1.5 cm and had measured approximately 5.0 x 1.8 cm on the   prior exam. [place] is metabolic activity associated, SUV maximum 5.2.  Small   air-fluid level is demonstrated within the right maxillary sinus posteriorly,   SUV maximum 2.2.       Mild activity at the vocal cords, likely physiologic.  Bilateral carotid   artery calcifications.  No dominant hypermetabolic teresa conglomerate. Subtle increased activity is seen at the right thyroid lobe, SUV maximum 2.5.       CHEST: Calcification of the thoracic aorta.  Coronary artery calcification.    Within the mediastinum, no teresa activity markedly greater than mediastinal   background.  Mild bilateral symmetric hilar activity, typically reactive or   granulomatous.  No axillary adenopathy.  Small amount of activity within the   distal esophagus, typically esophagitis.       Respiratory motion artifact is seen.  Scattered ground-glass opacity   bilaterally, likely atelectasis.  No pneumothorax or pleural effusion.       ABDOMEN/PELVIS: Excretion of activity is seen from bilateral kidneys and   activity is seen within the urinary bladder.  Bowel activity is seen which   can be physiologically variable.       Minimal left pelviectasis.  Nonobstructing punctate calculus at the lower   pole of the left kidney.  Aortoiliac calcification.           Impression   Persistent, though decreasing size of, nodular soft tissue filling defect   within the anterior right nasal passage, with associated hypermetabolism, for   which postoperative inflammation or residual neoplasm are considerations. Small air-fluid level within the right maxillary sinus, suggestive of   superimposed sinusitis.       No findings of distant FDG avid metastatic disease.       Possible hypermetabolic right thyroid nodule.  Thyroid ultrasound could be   performed for more complete characterization. ASSESSMENT/PLAN  Yesy Alan is a very pleasant 67 y.o. male with history of sinonasal squamous cell carcinoma status post surgery and IMRT radiation completed 7/14/2021. Presenting with upper back pain, noted to have pathological T4 fracture and lucent lesions at T9 and L4.      1. Cancer of nasal cavity and sinus (HCC)  2. S/P radiation therapy  3. Thoracic Compression Fracture    Plan:  At his last office visit with myself he had a friable pedunculated area of the posterior lateral nasal wall concerning for possible residual squamous cell carcinoma. Unfortunately today he presents with a pathologic fracture of his thoracic vertebrae with lucencies at other vertebrae. Biggest concern is that he has disseminated metastatic disease from his sinonasal cancer, especially since he went approximately 6 months before deciding to undergo radiation therapy after initial diagnosis. Possible kyphoplasty and vertebral biopsy tomorrow. It would be best to be able to obtain specimen to either rule in or out disseminated metastatic disease. If unable to perform biopsy can consider going to the OR for nasal debridement and biopsy of posteriolateral nasal mass.    Nasal saline

## 2021-09-08 NOTE — PROGRESS NOTES
Hospitalist Progress Note      PCP: Juan J Vazquez MD    Date of Admission: 9/7/2021    Chief Complaint: Back pain    Hospital Course: 67 y.o. 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 Patient states that the pain has been there for the past several weeks. Has been addressed with PMD who was concerned for cardiac etiology. Patient was seen at [address] [place] [address] on 8/31/2021 at which time he was evaluated with EKG troponins CT chest and nuclear stress test which did not reveal etiology. Patient was also supposed to get gallbladder ultrasound to rule out biliary etiology of this pain however opted to do it outpatient. Patient presents today to [facility] ER again with intractable back pain. Muscle relaxants prescribed at [facility] are not helping. Per patient spouse have all supply of pain medications from back when he had surgeries and has been managing with taking 1 pill a day. At the ED today CT chest shows metastatic disease and pathologic fracture of T4. Lucent lesions at T9 and L4 are also noted. Patient with marked leukocytosis of 21,000 with elevated lactate at 4.1 with resulting metabolic acidosis with anion gap with respiratory compensation. Subjective: Patient seen and examined at bedside. Not feeling well. Nausea and emesis multiple times this morning. Coffee-ground emesis in the basin. Pain better controlled.       Medications:  Reviewed    Infusion Medications    pantoprozole (PROTONIX) infusion      sodium chloride      sodium chloride 150 mL/hr at 09/07/21 0962     Scheduled Medications    vancomycin  1,000 mg IntraVENous Q12H    pantoprazole  80 mg IntraVENous Once    atorvastatin  40 mg Oral Nightly    valsartan  80 mg Oral Daily    sodium chloride flush  5-40 mL PHOS  --  2.1*     Recent Labs     09/07/21  1315   AST 18   ALT 22   BILITOT 0.6   ALKPHOS 128     Recent Labs     09/07/21  1315   INR 1.13*     Recent Labs     09/07/21  1315   TROPONINI <0.01       Urinalysis:      Lab Results   Component Value Date    NITRU Negative 09/07/2021    WBCUA 1 09/07/2021    RBCUA 1 09/07/2021    BLOODU Negative 09/07/2021    SPECGRAV >1.030 09/07/2021    GLUCOSEU 100 09/07/2021       Radiology:  US GALLBLADDER RUQ   Final Result   Liver is heterogeneous in appearance and increased in echogenicity compatible   with diffuse hepatocellular disease, hepatic steatosis. Subtle findings of   the liver are limited due to body habitus. Please refer to CT performed same   day for further evaluation. The gallbladder appears unremarkable in appearance. CT THORACIC SPINE TRAUMA RECONSTRUCTION   Preliminary Result   Findings consistent with metastatic disease and pathologic fracture at T4. The patient is likely a candidate for kyphoplasty and radiofrequency   ablation. Interventional radiology consult is suggested. Lucent lesions at T9 and L4 most likely represent Schmorl's node deformities,   however metastatic disease is difficult to exclude. CT LUMBAR SPINE WO CONTRAST   Preliminary Result   Findings consistent with metastatic disease and pathologic fracture at T4. The patient is likely a candidate for kyphoplasty and radiofrequency   ablation. Interventional radiology consult is suggested. Lucent lesions at T9 and L4 most likely represent Schmorl's node deformities,   however metastatic disease is difficult to exclude. CTA Chest W WO Contrast   Final Result   1. No acute intrathoracic abnormality. 2. No acute intra-abdominal abnormality. CT ABDOMEN PELVIS W IV CONTRAST Additional Contrast? None   Final Result   1. No acute intrathoracic abnormality. 2. No acute intra-abdominal abnormality.                  Assessment/Plan:    Active Hospital Problems    Diagnosis     Uncontrolled pain [R52]      Intractable mid upper back pain  Secondary to T4 pathologic fracture with metastatic disease  Pain control  IR consulted for possible kyphoplasty    Hematemesis  IV Protonix bolus and drip  N.p.o.  GI consultedEGD     Severe sepsis with lactic acidosis  Most likely source upper respiratory infection  Broad-spectrum antibiotics including anaerobic coverage  Follow-up blood cultures  IV fluids     Anion gap acidosis  Likely secondary to elevated lactic acid  IV fluids     Accelerated hypertension  In view of pain  Continue home dose of valsartan  IV hydralazine as needed     History of coronary heart disease  Recent stress test showed no ischemia  Unable to take antiplatelets in view of epistaxis     Squamous cell carcinoma of the sinus  Metastatic  ENT and heme-onc consulted    DVT Prophylaxis: No pharmacologic anticoagulation since admission given patient's history of epistaxis and recommendation against anticoagulation by ENT, additionally with coffee-ground emesis  Diet: Diet NPO  Code Status: DNR-CCA    Electronically signed by Valdez Camarena MD on 9/8/2021 at 3:06 PM

## 2021-09-09 NOTE — PROGRESS NOTES
Hospitalist Progress Note      PCP: Lilly Ramesh MD    Date of Admission: 9/7/2021    Chief Complaint: Back pain    Hospital Course: 67 y.o. 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 Patient states that the pain has been there for the past several weeks. Has been addressed with PMD who was concerned for cardiac etiology. Patient was seen at [address] [place] [address] on 8/31/2021 at which time he was evaluated with EKG troponins CT chest and nuclear stress test which did not reveal etiology. Patient was also supposed to get gallbladder ultrasound to rule out biliary etiology of this pain however opted to do it outpatient. Patient presents today to [facility] ER again with intractable back pain. Muscle relaxants prescribed at [facility] are not helping. Per patient spouse have all supply of pain medications from back when he had surgeries and has been managing with taking 1 pill a day. At the ED today CT chest shows metastatic disease and pathologic fracture of T4. Lucent lesions at T9 and L4 are also noted. Patient with marked leukocytosis of 21,000 with elevated lactate at 4.1 with resulting metabolic acidosis with anion gap with respiratory compensation. Subjective: Patient seen and examined at bedside. Not feeling well. Nausea and emesis multiple times this morning. Coffee-ground emesis in the basin. Pain better controlled.       Medications:  Reviewed    Infusion Medications    sodium chloride      sodium chloride 150 mL/hr at 09/07/21 7352     Scheduled Medications    vancomycin  1,000 mg IntraVENous Q12H    pantoprazole  40 mg IntraVENous BID    atorvastatin  40 mg Oral Nightly    valsartan  80 mg Oral Daily    sodium chloride flush  5-40 mL IntraVENous 2 times per day    cefepime 2,000 mg IntraVENous Q8H    metroNIDAZOLE  500 mg IntraVENous Q8H    lidocaine  1 patch TransDERmal Daily     PRN Meds: promethazine, sodium chloride, melatonin, ondansetron, sodium chloride flush, sodium chloride, acetaminophen **OR** acetaminophen, oxyCODONE **OR** oxyCODONE, morphine, hydrALAZINE, bisacodyl      Intake/Output Summary (Last 24 hours) at 9/9/2021 1204  Last data filed at 9/9/2021 0307  Gross per 24 hour   Intake 200 ml   Output 450 ml   Net -250 ml       Physical Exam Performed:    BP (!) 178/84   Pulse 100   Temp 97.6 °F (36.4 °C) (Oral)   Resp 18   Ht 5' 9\" (1.753 m)   Wt 184 lb 1.6 oz (83.5 kg)   SpO2 94%   BMI 27.19 kg/m²     General appearance: No apparent distress, appears stated age and cooperative. HEENT: Pupils equal, round, and reactive to light. Conjunctivae/corneas clear. Neck: Supple, with full range of motion. No jugular venous distention. Trachea midline. Respiratory:  Normal respiratory effort. Clear to auscultation, bilaterally without Rales/Wheezes/Rhonchi. Cardiovascular: Regular rate and rhythm with normal S1/S2 without murmurs, rubs or gallops. Abdomen: Soft, non-tender, non-distended with normal bowel sounds. Musculoskeletal: No clubbing, cyanosis or edema bilaterally. Full range of motion without deformity. Skin: Skin color, texture, turgor normal.  No rashes or lesions. Neurologic:  Neurovascularly intact without any focal sensory/motor deficits.  Cranial nerves: II-XII intact, grossly non-focal.  Psychiatric: Alert and oriented, thought content appropriate, normal insight  Capillary Refill: Brisk,< 3 seconds   Peripheral Pulses: +2 palpable, equal bilaterally       Labs:   Recent Labs     09/07/21  1315 09/07/21  1315 09/08/21  0429 09/08/21  0429 09/08/21  1823 09/09/21  0232 09/09/21  1029   WBC 21.4*  --  20.4*  --   --  22.3*  --    HGB 13.9   < > 12.6*   < > 12.6* 12.0* 11.6*   HCT 42.0   < > 37.4*   < > 39.0* 35.9* 35.1*     --  332  --   -- 332  --     < > = values in this interval not displayed. Recent Labs     09/07/21  1315 09/08/21  0429 09/09/21  0232   * 132* 130*   K 4.2 4.3 4.3   CL 98* 100 99   CO2 20* 21 19*   BUN 20 21* 19   CREATININE 1.1 0.7* 0.8   CALCIUM 10.1 9.1 9.1   PHOS  --  2.1* 2.3*     Recent Labs     09/07/21  1315   AST 18   ALT 22   BILITOT 0.6   ALKPHOS 128     Recent Labs     09/07/21  1315   INR 1.13*     Recent Labs     09/07/21  1315   TROPONINI <0.01       Urinalysis:      Lab Results   Component Value Date    NITRU Negative 09/07/2021    WBCUA 1 09/07/2021    RBCUA 1 09/07/2021    BLOODU Negative 09/07/2021    SPECGRAV >1.030 09/07/2021    GLUCOSEU 100 09/07/2021       Radiology:  US GALLBLADDER RUQ   Final Result   Liver is heterogeneous in appearance and increased in echogenicity compatible   with diffuse hepatocellular disease, hepatic steatosis. Subtle findings of   the liver are limited due to body habitus. Please refer to CT performed same   day for further evaluation. The gallbladder appears unremarkable in appearance. CT THORACIC SPINE TRAUMA RECONSTRUCTION   Final Result   Findings consistent with metastatic disease and pathologic fracture at T4. The patient is likely a candidate for kyphoplasty and radiofrequency   ablation. Interventional radiology consult is suggested. Lucent lesions at T9 and L4 most likely represent Schmorl's node deformities,   however metastatic disease is difficult to exclude. CT LUMBAR SPINE WO CONTRAST   Final Result   Findings consistent with metastatic disease and pathologic fracture at T4. The patient is likely a candidate for kyphoplasty and radiofrequency   ablation. Interventional radiology consult is suggested. Lucent lesions at T9 and L4 most likely represent Schmorl's node deformities,   however metastatic disease is difficult to exclude. CTA Chest W WO Contrast   Final Result   1. No acute intrathoracic abnormality.

## 2021-09-09 NOTE — CONSULTS
Infectious Diseases   Consult Note        Admission Date: 9/7/2021  Hospital Day: Hospital Day: 3   Attending: Timo Garnett MD  Date of service: 9/9/21     Reason for admission: Septicemia [facility]) [A41.9]  Hypertensive urgency [I16.0]  Uncontrolled pain [R52]  Pathologic thoracic fracture, initial encounter [Y37.72RZ]    Chief complaint/ Reason for consult: Sepsis    Microbiology:        I have reviewed allavailable micro lab data and cultures    · Blood culture (2/2) - collected on 9/7/2021: In process      Antibiotics and immunizations:       Current antibiotics: All antibiotics and their doses were reviewed by me    Recent Abx Admin                   vancomycin 1000 mg IVPB in 250 mL D5W addavial (mg) 1,000 mg New Bag 09/09/21 1048     1,000 mg New Bag 09/08/21 2019    cefepime (MAXIPIME) 2000 mg IVPB minibag (mg) 2,000 mg New Bag 09/09/21 0950     2,000 mg New Bag  0102     2,000 mg New Bag 09/08/21 1822    metronidazole (FLAGYL) 500 mg in NaCl 100 mL IVPB premix (mg) 500 mg New Bag 09/09/21 0602     500 mg New Bag 09/08/21 2223     500 mg New Bag  1603                  Immunization History: All immunization history was reviewed by me today. Immunization History   Administered Date(s) Administered    Tdap (Boostrix, Adacel) 09/16/2019       Known drug allergies: All allergies were reviewed and updated    Allergies   Allergen Reactions    Fish Oil Anaphylaxis    Shellfish Allergy Anaphylaxis    Lisinopril      Other reaction(s): Cough       Social history:     Social History:  All social andepidemiologic history was reviewed and updated by me today as needed. · Tobacco use:   reports that he has never smoked. He has never used smokeless tobacco.  · Alcohol use:   reports no history of alcohol use. · Currently lives in: [address]  ·  reports no history of drug use.      COVID VACCINATION AND LAB RESULT RECORDS:     Internal Administration   First Dose      Second Dose           Last COVID Lab SARS-CoV-2 (no units)   Date Value   04/08/2021 Not Detected            Assessment:     The patient is a 67 y.o. old male who  has a past medical history of AAA (abdominal aortic aneurysm) ([facility] [address] CAD (coronary artery disease), Cancer ([facility] [address] Hyperlipidemia, Hypertension, and Nasal bleeding. with following problems:    · Sepsis with tachycardia, tachypnea, hypotension worsening leukocytosis  · Spinal metastatic disease  · Vertebral fracture at T4 level  · Fatty liver disease  · Hyponatremia  · Coronary artery disease  · Essential hypertension  · Abdominal aortic aneurysm  · Hyperlipidemia      Discussion:      The patient is afebrile. White cell count was 41,400 on admission. It has been going up and is 22,300 today. Blood cultures done on 9/7/2021 have been negative. Nasal MRSA screen done on 9/8/2021 was negative. I reviewed the images of his CT scan of chest abdomen and pelvis. It did not show any acute abnormalities. CT scan thoracic and lumbar spine is concerning for metastatic disease to the spine with pathological fracture at T4 level    Source of sepsis and leukocytosis is unclear    Plan:     Diagnostic Workup:    · Agree with blood cultures  · Will order procalcitonin level  · We will order CT scan of head and neck with IV contrast  · Continue to follow fever curve, WBC count and blood cultures  · Follow up on liverand renal functions closely    Antimicrobials:    · Will or order empiric IV vancomycin  Check Vancomycin trough before the 5th dose. Target vancomycin trough level of 15-20  mg/L or vancomycin area under curve (AUC) of 400-600 mg*h/L by Bayesian modeling method. If dosing vancomycin based on trough levels, keep vancomycin trough below 20 at all times. Avoid increasing the dose of vancomycin above a total of 4 grams in a 24-hour period in patients younger than 45 years and above 3 grams in a 24-hour period in a patient of age 39 years or older.     Continue to monitor serum creatinine and Vanco levels closely, while the patient is on I/v Vancomycin. · Under IV cefepime 2 g every 8 hours  · Will continue empiric IV Flagyl 500 mg every 8 hours  · We will follow up on the culture results and clinical progress and will make further recommendations accordingly. · Continue close vitals monitoring. · Maintain good glycemic control. · Fall precautions. Aspiration precautions. · Continue to watch for new fever or diarrhea. · DVT prophylaxis. · Discussed all above with patient and RN. Drug Monitoring:    · Continue serial monitoring for antibiotic toxicity as follows: *Vancomycin trough, CBC, CMP  · Continue to watch for following: new or worsening fever, hypotension, hives, lip swelling and redness or purulence at vascular access sites. I/v access Management:    · Continue to monitor i.v access sites for erythema, induration, discharge or tenderness. · As always, continue efforts to minimizetubes/lines/drains as clinically appropriate to reduce chances of line associated infections. Current isolation precautions: There are no current isolations documented for this patient. Level of complexity of consult: High     Risk of Complications/Morbidity: High     · Illness(es)/ Infection present that pose threat to life/bodily function. · There is potential for severe exacerbation of infection/side effects of treatment. · Therapy requires intensive monitoring for antimicrobial agent toxicity. Thank you for involving me in the care of your patient. I will continue to follow. If you have any additional questions, please do not hesitate to contact me. Subjective:     Presenting complaint in ER:     Chief Complaint   Patient presents with    Back Pain     Patient in by squad from home with complaints of back pain. States he had radiation treatment 3 week. Admitted 1 week ago for same pain at [place] [address].          HPI: Marina Coffman is a 67 y.o. male patient, who was seen at the request of Dr. Jose Varela MD.    History was obtained from chart review and the patient. The patient was admitted on 9/7/2021. I have been consulted to see the patient for above mentioned reason(s). The patient has multiple medical comorbidities, and presented to the ER for thoracic back pain. The patient had elevated white cell count of 21,400 on admission. She was hospitalized. Blood cultures were sent. She was started on empiric IV vancomycin and cefepime and Flagyl. He had CT scan of chest abdomen pelvis done and CT scan of thoracic and lumbar spine that was consistent with metastatic lesions involving spine with pathological fracture at T4 level. White cell count is going up and is 22,300 today    I have been asked for my opinion for management for this patient. Past Medical History: All past medical history reviewed today. Past Medical History:   Diagnosis Date    AAA (abdominal aortic aneurysm) ([facility] [address]     CAD (coronary artery disease)     stents 2017    Cancer ([facility] [address]     Hyperlipidemia     Hypertension     Nasal bleeding     worse since covid making the mask mandatory         Past Surgical History: All pastsurgical history was reviewed today. Past Surgical History:   Procedure Laterality Date    CARDIAC SURGERY  2017    stents placed     COLONOSCOPY      SINUS ENDOSCOPY N/A 11/2/2020    FUNCTIONAL ENDOSCOPIC SINUS SURGERY performed by Marina Tovar MD at [address] [place] [address] 4/12/2021    RIGHT FUNCTIONAL ENDOSCOPIC SINUS SURGERY ([id number], [address] [place] [address], [facility]) performed by Marina Tovar MD at [facility] 9/8/2021    EGD GASTRIC BIOPSY performed by Harmony Cerrato MD at [address] [place] [address]         Family History: All family history was reviewed today. Problem Relation Age of Onset    Other Brother         anurysim         Medications:  All current and past medications were pertinent images and reports for the current visit were reviewed by meduring this visit. US GALLBLADDER RUQ   Final Result   Liver is heterogeneous in appearance and increased in echogenicity compatible   with diffuse hepatocellular disease, hepatic steatosis. Subtle findings of   the liver are limited due to body habitus. Please refer to CT performed same   day for further evaluation. The gallbladder appears unremarkable in appearance. CT THORACIC SPINE TRAUMA RECONSTRUCTION   Final Result   Findings consistent with metastatic disease and pathologic fracture at T4. The patient is likely a candidate for kyphoplasty and radiofrequency   ablation. Interventional radiology consult is suggested. Lucent lesions at T9 and L4 most likely represent Schmorl's node deformities,   however metastatic disease is difficult to exclude. CT LUMBAR SPINE WO CONTRAST   Final Result   Findings consistent with metastatic disease and pathologic fracture at T4. The patient is likely a candidate for kyphoplasty and radiofrequency   ablation. Interventional radiology consult is suggested. Lucent lesions at T9 and L4 most likely represent Schmorl's node deformities,   however metastatic disease is difficult to exclude. CTA Chest W WO Contrast   Final Result   1. No acute intrathoracic abnormality. 2. No acute intra-abdominal abnormality. CT ABDOMEN PELVIS W IV CONTRAST Additional Contrast? None   Final Result   1. No acute intrathoracic abnormality. 2. No acute intra-abdominal abnormality. Outside records:    Labs, Microbiology, Radiology and pertinent results from Care everywhere, if available, were reviewed as a part ofthe consultation.       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 Although every effort was made to ensure the accuracy of this automated transcription, some errors in transcription may have occurred inadvertently. If you may need any clarification, please do not hesitate to contact me through EPIC or at the phone number provided below with my electronic signature. Any pictures or media included in this note were obtained after taking informed verbal consent from the patient and with their approval to include those in the patient's medical record.       Gopi Grayson MD, MPH  9/9/21, 1:49 PM EDT   [facility] Infectious Disease   [address] [place] [address], [address] [place] [address]  Office: [phone]  Fax: [phone]  Clinic days:  Tuesday & Thursday

## 2021-09-09 NOTE — PROGRESS NOTES
Gastroenterology Progress Note    Audelia Zambrano is a 67 y.o. male patient. 1. Pathologic thoracic fracture, initial encounter    2. Septicemia ([address]    3. Hypertensive urgency        Admission Date: 2021  Hospital Day: Hospital Day: 3  Attending: Jose Varela MD  Date of service: 21    SUBJECTIVE:    Patient was nauseous yesterday. He tolerated broth for lunch  S/p EGD  - no active GI bleeding     ROS:  Cardiovascular ROS: no chest pain or dyspnea on exertion  Gastrointestinal ROS: see above  Respiratory ROS: no cough, shortness of breath, or wheezing    Physical    VITALS:  BP (!) 178/84   Pulse 100   Temp 97.6 °F (36.4 °C) (Oral)   Resp 18   Ht 5' 9\" (1.753 m)   Wt 184 lb 1.6 oz (83.5 kg)   SpO2 94%   BMI 27.19 kg/m²   TEMPERATURE:  Current - Temp: 97.6 °F (36.4 °C); Max - Temp  Av °F (36.7 °C)  Min: 97.6 °F (36.4 °C)  Max: 98.4 °F (36.9 °C)    NAD  RRR, Nl s1s2  Lungs CTA Bilaterally, normal effort  Abdomen soft, ND, NT, no HSM, Bowel sounds normal  AAOx3, No asterixis     Data      CBC:   Recent Labs     21  13121  04221  1823 21  0232   WBC 21.4*  --  20.4*  --  22.3*   RBC 4.96  --  4.52  --  4.31   HGB 13.9   < > 12.6* 12.6* 12.0*   HCT 42.0   < > 37.4* 39.0* 35.9*     --  332  --  332   MCV 84.8  --  82.6  --  83.2   MCH 28.0  --  27.9  --  27.9   MCHC 33.1  --  33.8  --  33.6   RDW 13.4  --  13.4  --  13.5    < > = values in this interval not displayed.         BMP:  Recent Labs     21  1315 21  0429 21  0232   * 132* 130*   K 4.2 4.3 4.3   CL 98* 100 99   CO2 20* 21 19*   BUN 20 21* 19   CREATININE 1.1 0.7* 0.8   CALCIUM 10.1 9.1 9.1   GLUCOSE 203* 196* 156*        Hepatic Function Panel:   Recent Labs     21  1315   AST 18   ALT 22   BILITOT 0.6   ALKPHOS 128       Recent Labs     21  1315   LIPASE 19.0   AMYLASE 61     Recent Labs     21  1315   PROTIME 12.8*   INR 1.13*     No results for input(s): PTT in the last 72 hours. No results for input(s): OCCULTBLD in the last 72 hours. Radiology Review:    US GALLBLADDER RUQ   Final Result   Liver is heterogeneous in appearance and increased in echogenicity compatible   with diffuse hepatocellular disease, hepatic steatosis. Subtle findings of   the liver are limited due to body habitus. Please refer to CT performed same   day for further evaluation. The gallbladder appears unremarkable in appearance. CT THORACIC SPINE TRAUMA RECONSTRUCTION   Final Result   Findings consistent with metastatic disease and pathologic fracture at T4. The patient is likely a candidate for kyphoplasty and radiofrequency   ablation. Interventional radiology consult is suggested. Lucent lesions at T9 and L4 most likely represent Schmorl's node deformities,   however metastatic disease is difficult to exclude. CT LUMBAR SPINE WO CONTRAST   Final Result   Findings consistent with metastatic disease and pathologic fracture at T4. The patient is likely a candidate for kyphoplasty and radiofrequency   ablation. Interventional radiology consult is suggested. Lucent lesions at T9 and L4 most likely represent Schmorl's node deformities,   however metastatic disease is difficult to exclude. CTA Chest W WO Contrast   Final Result   1. No acute intrathoracic abnormality. 2. No acute intra-abdominal abnormality. CT ABDOMEN PELVIS W IV CONTRAST Additional Contrast? None   Final Result   1. No acute intrathoracic abnormality. 2. No acute intra-abdominal abnormality. ASSESSMENT   Coffee-ground emesis/anemia -patient acute onset of nausea, vomiting and coffee-ground emesis today. CT abdomen pelvis without source. No recent epistaxis. EGD 9/8 - Mild esophagitis, small HH, coffee ground material and food in fundus, limiting endoscopic view, patchy gastritis (biopsied).  Hb stable 12+  sepsis -Felt to be from sinusitis. On IV antibiotics. ID consulted. T4 pathologic fracture concerning for metastatic disease  H/o SCC of the sinuses    PLAN    Await bx  IV PPI for now, switch to oral PPI if tolerating PO intake  Continue clear liquids for now.  Then advance as tolerated    Aniceto Hewitt MD, MSc  [facility]  09/09/21

## 2021-09-09 NOTE — CARE COORDINATION
Discharge Planning Assessment    SW discharge planner met with patient to discuss reason for admission, current living situation, and potential needs at the time of discharge. Demographics/Insurance verified:  Yes    Current type of dwelling:  Mobile Home - 4 steps in    Living arrangements:  w/spouse    Level of function/Support:  Spouse reported pt was independent prior to admission. Spouse is supportive and can assist with any needs. Stated they have a supportive community where they live as well. PCP:  Dr. Ar Pablo    Last Visit to PCP:  08/31/21    DME: Shower chair, grab bars, rolling walker, cane, transport chair. Active with any community resources/agencies/skilled home care:  None. Spouse asked about possible home delivered meals with [facility]. SW informed we could make a referral or provide them with the phone number to call themselves. Spouse declined both at this time stating she will wait to see how pt is doing at discharge. Medication compliance issues:  No    Financial issues that could impact healthcare:  No    Tentative discharge plan:  PT/OT recommended HHC. Spouse is agreeable with follow up therapy but unsure if she wants [address] or outpatient therapy at this time. Stated it depends on pt's pain control and what we decide to do about the fracture in his back. [address] list was provided and will follow up with spouse closer to discharge with her decision. Spouse may also want [facility] referral.    Discussed with patient and/or family that on the day of discharge home tentative time of discharge will be between 10 AM and noon.     Transportation at the time of discharge:  Spouse can assist.    Electronically signed by MARYURI Swenson LSW on 9/9/2021 at 7:11 PM

## 2021-09-09 NOTE — PROGRESS NOTES
Occupational Therapy   Occupational Therapy Initial Assessment  Date: 2021   Patient Name: Matthieu Urbina  MRN: [id number]     : 1945    Date of Service: 2021    Discharge Recommendations: Matthieu Urbina scored a 20/24 on the AM-PAC ADL Inpatient form. Current research shows that an AM-PAC score of 18 or greater is typically associated with a discharge to the patient's home setting. Based on the patient's AM-PAC score, and their current ADL deficits, it is recommended that the patient have 2-3 sessions per week of Occupational Therapy at d/c to increase the patient's independence. At this time, this patient demonstrates the endurance and safety to discharge home with home services and a follow up treatment frequency of 2-3x/wk. Please see assessment section for further patient specific details. If patient discharges prior to next session this note will serve as a discharge summary. Please see below for the latest assessment towards goals. HOME HEALTH CARE: LEVEL 1 STANDARD    - Initial home health evaluation to occur within 24-48 hours, in patient home   - Therapy to evaluate with goal of regaining prior level of functioning   - Therapy to evaluate if patient has [address] needs for personal care       OT Equipment Recommendations  Equipment Needed: No    Assessment   Performance deficits / Impairments: Decreased functional mobility ; Decreased balance;Decreased ADL status; Decreased high-level IADLs;Decreased endurance  Assessment: Patient presents slightly below baseline d/t above deficits; OT indicated to maximize safety/independence with ADL and IADL  Treatment Diagnosis: Above deficits associated with septicemia  Prognosis: Good  Decision Making: Low Complexity  Exam: as above  OT Education: OT Role;Plan of Care  Patient Education: eval, discharge--pt v/u  REQUIRES OT FOLLOW UP: Yes  Activity Tolerance  Activity Tolerance: Patient Tolerated treatment well;Patient limited by pain  Activity Tolerance: eval, discharge--pt v/u  Safety Devices  Safety Devices in place: Yes  Type of devices: All fall risk precautions in place;Nurse notified;Call light within reach; Left in chair;Gait belt (no alarm per RN)           Patient Diagnosis(es): The primary encounter diagnosis was Pathologic thoracic fracture, initial encounter. Diagnoses of Septicemia ([facility] [address] and Hypertensive urgency were also pertinent to this visit. has a past medical history of AAA (abdominal aortic aneurysm) ([facility] [address] CAD (coronary artery disease), Cancer ([facility] [address] Hyperlipidemia, Hypertension, and Nasal bleeding. has a past surgical history that includes Cardiac surgery (2017); Colonoscopy; Sinus endoscopy (N/A, 11/2/2020); Sinus endoscopy (Right, 4/12/2021); and Upper gastrointestinal endoscopy (N/A, 9/8/2021). Treatment Diagnosis: Above deficits associated with septicemia      Restrictions  Restrictions/Precautions  Restrictions/Precautions: Fall Risk, Modified Diet (high fall risk; adult diet - full liquids)  Required Braces or Orthoses?: No  Position Activity Restriction  Other position/activity restrictions: 67 y.o. 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 Patient states that the pain has been there for the past several weeks. Has been addressed with PMD who was concerned for cardiac etiology. Patient was seen at [address] [place] [address] on 8/31/2021 at which time he was evaluated with EKG troponins CT chest and nuclear stress test which did not reveal etiology. Patient was also supposed to get gallbladder ultrasound to rule out biliary etiology of this pain however opted to do it outpatient. Patient presents today to [facility] ER again with intractable back pain.   Muscle relaxants prescribed at [facility] are not helping. Per patient spouse have all supply of pain medications from back when he had surgeries and has been managing with taking 1 pill a day. At the ED today CT chest shows metastatic disease and pathologic fracture of T4. Lucent lesions at T9 and L4 are also noted. Patient with marked leukocytosis of 21,000 with elevated lactate at 4.1 with resulting metabolic acidosis with anion gap with respiratory compensation. Hospitalist consulted for admission. Subjective   General  Chart Reviewed: Yes  Diagnosis: Septicemia  Subjective  Subjective: Patient supine in bed upon arrival, agreeable to evaluation. Reports intermittent pain but does not rate--RN aware  Patient Currently in Pain: Yes  Pre Treatment Pain Screening  Intervention List: Patient able to continue with treatment;Nurse/Physician notified  Vital Signs  Patient Currently in Pain: Yes  Social/Functional History  Social/Functional History  Lives With: Spouse  Type of Home: Trailer (trailor in a community)  Home Access: Stairs to enter with rails  Entrance Stairs - Number of Steps: 4  Entrance Stairs - Rails: Right  Bathroom Shower/Tub: Tub/Shower unit, Walk-in shower, Shower chair without back (has both, but tends to use the tub/shower the most)  Bathroom Toilet: Handicap height  Bathroom Equipment: Shower chair, Grab bars in shower, Grab bars around toilet  Home Equipment: Rolling walker, Cane (transport chair; [facility])  ADL Assistance: Independent  Homemaking Assistance: Needs assistance (spouse does cooking, cleaning, laundry, grocery shopping)  Homemaking Responsibilities: No  Ambulation Assistance: Independent  Transfer Assistance: Independent  Active :  Yes  Additional Comments: no falls in the last 6 months       Objective   Vision: Impaired  Vision Exceptions: Wears glasses at all times  Hearing: Exceptions to [facility]  Hearing Exceptions: Hard of hearing/hearing concerns (R ear more limited)    Orientation  Overall Orientation Status: Within Functional Limits     Balance  Sitting Balance: Supervision  Standing Balance: Stand by assistance  Functional Mobility  Functional - Mobility Device: No device  Activity: Other  Assist Level: Stand by assistance  Functional Mobility Comments: EOB > bathroom > chair, chair > hallway > chair. Slow jimy but steady, no LOB  ADL  Toileting: Contact guard assistance (voiding in stance)  Tone RUE  RUE Tone: Normotonic  Tone LUE  LUE Tone: Normotonic  Coordination  Movements Are Fluid And Coordinated: Yes     Bed mobility  Supine to Sit: Stand by assistance  Sit to Supine: Stand by assistance  Scooting: Stand by assistance  Transfers  Sit to stand: Stand by assistance  Stand to sit: Stand by assistance     Cognition  Overall Cognitive Status: WFL  Perception  Overall Perceptual Status: WFL     Sensation  Overall Sensation Status: WNL        LUE AROM (degrees)  LUE AROM : WFL  RUE AROM (degrees)  RUE AROM : WFL                      Plan   Plan  Times per week: 3-5  Times per day: Daily  Current Treatment Recommendations: Strengthening, Balance Training, Functional Mobility Training, Safety Education & Training, Home Management Training, Equipment Evaluation, Education, & procurement, Self-Care / ADL, Patient/Caregiver Education & Training    G-Code     OutComes Score                                                  AM-PAC Score        -[facility] Inpatient Daily Activity Raw Score: 20 (09/09/21 1222)  -PAC Inpatient ADL T-Scale Score : 42.03 (09/09/21 1222)  ADL Inpatient CMS 0-100% Score: 38.32 (09/09/21 1222)  ADL Inpatient CMS G-Code Modifier : Jose Schwartz (09/09/21 1222)    Goals  Short term goals  Time Frame for Short term goals: discharge  Short term goal 1: UB ADL mod I  Short term goal 2: LB ADL mod I  Short term goal 3: Fxl transfers mod I  Short term goal 4: Fxl mob mod I  Short term goal 5:  Toileting mod I  Long term goals  Time Frame for Long term goals : LTG=STG       Therapy Time   Individual Concurrent Group

## 2021-09-09 NOTE — PROGRESS NOTES
Physical Therapy    Facility/Department: [address] ORTHO/NEURO NURSING  Initial Assessment    NAME: Reid Pickett  : 1945  MRN: [id number]    Date of Service: 2021    Discharge Recommendations: Reid Pickett scored a 19/24 on the AM-PAC short mobility form. Current research shows that an AM-PAC score of 18 or greater is typically associated with a discharge to the patient's home setting. Based on the patient's AM-PAC score and their current functional mobility deficits, it is recommended that the patient have 2-3 sessions per week of Physical Therapy at d/c to increase the patient's independence. At this time, this patient demonstrates the endurance and safety to discharge home with HHPT and a follow up treatment frequency of 2-3x/wk. Please see assessment section for further patient specific details. If patient discharges prior to next session this note will serve as a discharge summary. Please see below for the latest assessment towards goals. HOME HEALTH CARE: LEVEL 1 STANDARD    - Initial home health evaluation to occur within 24-48 hours, in patient home   - Therapy to evaluate with goal of regaining prior level of functioning   - Therapy to evaluate if patient has [address] needs for personal care      PT Equipment Recommendations  Equipment Needed: No    Assessment   Body structures, Functions, Activity limitations: Decreased functional mobility ; Increased pain;Decreased posture;Decreased ADL status; Decreased ROM; Decreased strength;Decreased endurance;Decreased coordination;Decreased balance  Assessment: Pt presents w/increased back pain during session limiting ability to stay in prolonged positions such as sitting, standing, and ambulating. Able to ambulate around room and about 75ft w/CGA progressing to SBA in hallway before returning to chair. Pt would benefit from continued skilled therapy services to address deficits and promote return to PLOF.   Treatment Diagnosis: Decreased endurance, functional mobility, ROM, strength, and increased pain limiting full participation in ADLs and prolonged standing and ambulation tasks  Prognosis: Good  Decision Making: Low Complexity  PT Education: Goals;PT Role;Plan of Care  Patient Education: Pt verbalized understanding  Barriers to Learning: hearing  REQUIRES PT FOLLOW UP: Yes  Activity Tolerance  Activity Tolerance: Patient Tolerated treatment well;Patient limited by pain  Activity Tolerance: Pt tolerated treatment w/increased pain throughout session, but was able to complete ambulation and mobility tasks this date. Patient Diagnosis(es): The primary encounter diagnosis was Pathologic thoracic fracture, initial encounter. Diagnoses of Septicemia ([facility] [address] and Hypertensive urgency were also pertinent to this visit. has a past medical history of AAA (abdominal aortic aneurysm) ([facility] [address] CAD (coronary artery disease), Cancer ([facility] [address] Hyperlipidemia, Hypertension, and Nasal bleeding. has a past surgical history that includes Cardiac surgery (2017); Colonoscopy; Sinus endoscopy (N/A, 11/2/2020); Sinus endoscopy (Right, 4/12/2021); and Upper gastrointestinal endoscopy (N/A, 9/8/2021). Restrictions  Restrictions/Precautions  Restrictions/Precautions: Fall Risk, Modified Diet (high fall risk; adult diet - full liquids)  Required Braces or Orthoses?: No  Position Activity Restriction  Other position/activity restrictions: 67 y.o. 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 Patient states that the pain has been there for the past several weeks. Has been addressed with PMD who was concerned for cardiac etiology.   Patient was seen at [address] [place] [address] on 8/31/2021 at which time he was evaluated with EKG troponins CT chest and nuclear stress test which did not reveal etiology. Patient was also supposed to get gallbladder ultrasound to rule out biliary etiology of this pain however opted to do it outpatient. Patient presents today to [facility] ER again with intractable back pain. Muscle relaxants prescribed at [facility] are not helping. Per patient spouse have all supply of pain medications from back when he had surgeries and has been managing with taking 1 pill a day. At the ED today CT chest shows metastatic disease and pathologic fracture of T4. Lucent lesions at T9 and L4 are also noted. Patient with marked leukocytosis of 21,000 with elevated lactate at 4.1 with resulting metabolic acidosis with anion gap with respiratory compensation. Hospitalist consulted for admission. Vision/Hearing  Vision: Impaired  Vision Exceptions: Wears glasses at all times  Hearing: Exceptions to [facility]  Hearing Exceptions: Hard of hearing/hearing concerns (R ear more limited)       Subjective  General  Chart Reviewed: Yes  Family / Caregiver Present: No  Diagnosis: Intractable mid upper back pain; septicemia  Follows Commands: Within Functional Limits  General Comment  Comments: Pt found supine in bed w/HOB elevated; pt agreeable to PT/OT evaluation  Subjective  Subjective: Pt reports feeling nauseous and having frequent tremors throughout his body. Mentions not being in as much pain while laying down.   Pain Screening  Patient Currently in Pain: Yes -pain level in back fluctuated with transfers vs. gait (8/10 with initial sit at EOB, 2/10 after gait)    Orientation  Orientation  Overall Orientation Status: Within Normal Limits    Social/Functional History  Social/Functional History  Lives With: Spouse  Type of Home: Trailer (trailor in a community)  Home Access: Stairs to enter with rails  Entrance Stairs - Number of Steps: 4  Entrance Stairs - Rails: Right  Bathroom Shower/Tub: Tub/Shower unit, Walk-in shower, Shower chair without back (has both, but tends to use the tub/shower the most)  Bathroom Toilet: Handicap height  Bathroom Equipment: Shower chair, Grab bars in shower, Grab bars around toilet  Home Equipment: Rolling walker, Cane (transport chair; [address])  ADL Assistance: [address]: Needs assistance (spouse does cooking, cleaning, laundry, grocery shopping)  Homemaking Responsibilities: No  Ambulation Assistance: Independent  Transfer Assistance: Independent  Active : Yes  Additional Comments: no falls in the last 6 months    Cognition   Cognition  Overall Cognitive Status: WFL    Objective  AROM RLE (degrees)  RLE AROM: WFL  RLE General AROM: hip flexion limited d/t increased back pain while performing movement  AROM LLE (degrees)  LLE AROM : WFL  LLE General AROM: hip flexion limited d/t increased back pain while performing movement  Strength RLE  Comment: hip flexion 3+/5 d/t increased back pain, knee flexion 4+/5, knee extension 4-/5, ankle DF 4-/5  Strength LLE  Comment: hip flexion 3+/5 d/t increased back pain, knee flexion 5/5, knee extension 4/5, ankle DF 4-/5     Sensation  Overall Sensation Status: WNL  Bed mobility  Supine to Sit: Stand by assistance  Sit to Supine: Stand by assistance  Scooting: Stand by assistance  Transfers  Sit to Stand: Stand by assistance  Stand to sit: Stand by assistance  Ambulation  Ambulation?: Yes  More Ambulation?: No  Ambulation 1  Surface: level tile  Device: No Device  Assistance: Contact guard assistance;Stand by assistance (CGA progressing to SBA)  Quality of Gait: forward head and trunk posture  Gait Deviations: Increased AMBROSIO; Decreased step length  Distance: 15ft + 20ft + 75ft  Comments: EOB to bathroom and then to chair, chair to ambulate in hallway and back to chair; Pt when ambulating to bathroom had increased pain to 8/10 once seated in chair. When ambulating second time in hallway, pt's pain decreased 2-3/10.   Stairs/Curb  Stairs?: No     Balance  Posture: Good  Sitting - Static: Good (SBA)  Sitting - Dynamic: Good (SBA)  Standing - Static: Good;- (CGA progressing to SBA w/no AD)  Standing - Dynamic: Good;- (CGA progressing to SBA w/no AD)    Plan   Safety Devices  Type of devices: All fall risk precautions in place, Left in chair, Call light within reach, Gait belt, Nurse notified  Restraints  Initially in place: No    AM-PAC Score  AM-PAC Inpatient Mobility Raw Score : 19 (09/09/21 1046)  AM-PAC Inpatient T-Scale Score : 45.44 (09/09/21 1046)  Mobility Inpatient CMS 0-100% Score: 41.77 (09/09/21 1046)  Mobility Inpatient CMS G-Code Modifier : CK (09/09/21 1046)    Goals  Short term goals  Time Frame for Short term goals: d/c  Short term goal 1: Pt will be able to perform bed mobility mod I  Short term goal 2: Pt will be able to perform STS mod I  Short term goal 3: Pt will be able to ambulate 150ft w/supervision  Short term goal 4: Pt will be able to ascend/descend 4 steps w/SBA  Short term goal 5: Pt will be able to perform a car transfer w/SBA       Therapy Time   Individual Concurrent Group Co-treatment   Time In 0825         Time Out 0920         Minutes 55         Timed Code Treatment Minutes: 36 Minutes     ML Whitley PT   I agree with the above note. PT directly observed the SPT with the patient.   Ghada Pappas DPT [id number]

## 2021-09-09 NOTE — PLAN OF CARE
Problem: Pain:  Goal: Pain level will decrease  Description: Pain level will decrease  Outcome: Ongoing  Note: Pt c/o pain. Gave PRN oxy 10mg. Will continue to monitor. Problem: Falls - Risk of:  Goal: Will remain free from falls  Description: Will remain free from falls  Outcome: Ongoing  Note: Pt alert and awake in bed. Call light within a reach.

## 2021-09-09 NOTE — PROGRESS NOTES
[facility]   Progress Note      9/9/2021 3:39 PM        Name: Dwain Garcia . Admitted: 9/7/2021    SUBJECTIVE:  Patient still with nausea and vomiting, back pain. Reviewed interval ancillary notes    Current Medications  [START ON 9/10/2021] pantoprazole (PROTONIX) tablet 40 mg, QAM AC  promethazine (PHENERGAN) injection 12.5 mg, Q6H PRN  vancomycin 1000 mg IVPB in 250 mL D5W addavial, Q12H  sodium chloride (OCEAN, BABY AYR) 0.65 % nasal spray 1 spray, Q6H PRN  melatonin tablet 3 mg, Nightly PRN  ondansetron (ZOFRAN) injection 4 mg, Q6H PRN  atorvastatin (LIPITOR) tablet 40 mg, Nightly  valsartan (DIOVAN) tablet 80 mg, Daily  sodium chloride flush 0.9 % injection 5-40 mL, 2 times per day  sodium chloride flush 0.9 % injection 5-40 mL, PRN  0.9 % sodium chloride infusion, PRN  acetaminophen (TYLENOL) tablet 650 mg, Q6H PRN   Or  acetaminophen (TYLENOL) suppository 650 mg, Q6H PRN  0.9 % sodium chloride infusion, Continuous  cefepime (MAXIPIME) 2000 mg IVPB minibag, Q8H  oxyCODONE (ROXICODONE) immediate release tablet 5 mg, Q4H PRN   Or  oxyCODONE (ROXICODONE) immediate release tablet 10 mg, Q4H PRN  morphine (PF) injection 2 mg, Q4H PRN  metronidazole (FLAGYL) 500 mg in NaCl 100 mL IVPB premix, Q8H  hydrALAZINE (APRESOLINE) injection 10 mg, Q6H PRN  lidocaine 4 % external patch 1 patch, Daily  bisacodyl (DULCOLAX) EC tablet 5 mg, Daily PRN        Objective:  BP (!) 178/84   Pulse 100   Temp 97.6 °F (36.4 °C) (Oral)   Resp 18   Ht 5' 9\" (1.753 m)   Wt 184 lb 1.6 oz (83.5 kg)   SpO2 94%   BMI 27.19 kg/m²     Intake/Output Summary (Last 24 hours) at 9/9/2021 1539  Last data filed at 9/9/2021 3863  Gross per 24 hour   Intake    Output 300 ml   Net -300 ml      Wt Readings from Last 3 Encounters:   09/07/21 184 lb 1.6 oz (83.5 kg)   08/31/21 185 lb (83.9 kg)   06/01/21 197 lb (89.4 kg)       General appearance:  Appears comfortable  Eyes: Sclera clear.  Pupils equal.  ENT: Moist oral mucosa. Trachea midline, no adenopathy. Cardiovascular: Regular rhythm, normal S1, S2. No murmur. No edema in lower extremities  Respiratory: Not using accessory muscles. Good inspiratory effort. Clear to auscultation bilaterally, no wheeze or crackles. GI: Abdomen soft, no tenderness, not distended  Musculoskeletal: No cyanosis in digits, neck supple  Neurology: CN 2-12 grossly intact. No speech or motor deficits  Psych: Normal affect. Alert and oriented in time, place and person  Skin: Warm, dry, normal turgor    Labs and Tests:  CBC:   Recent Labs     09/07/21  1315 09/07/21  1315 09/08/21  0429 09/08/21  0429 09/08/21  1823 09/09/21  0232 09/09/21  1029   WBC 21.4*  --  20.4*  --   --  22.3*  --    HGB 13.9   < > 12.6*   < > 12.6* 12.0* 11.6*     --  332  --   --  332  --     < > = values in this interval not displayed. BMP:    Recent Labs     09/07/21  1315 09/08/21  0429 09/09/21  0232   * 132* 130*   K 4.2 4.3 4.3   CL 98* 100 99   CO2 20* 21 19*   BUN 20 21* 19   CREATININE 1.1 0.7* 0.8   GLUCOSE 203* 196* 156*     Hepatic:   Recent Labs     09/07/21  1315   AST 18   ALT 22   BILITOT 0.6   ALKPHOS 128       ASSESSMENT AND PLAN    Active Problems:    Uncontrolled pain    Pathologic thoracic fracture, initial encounter    Hypertensive urgency    Spine metastasis (HCC)    Bandemia    Hyponatremia    Coronary artery disease due to lipid rich plaque    Fatty liver disease, nonalcoholic    Essential hypertension    Hyperlipidemia  Resolved Problems:    * No resolved hospital problems. *      78 year old male with a history of AAA, CAD, HTN and hyperlipidemia. He has:        1. Sepsis with lactic acidosis  -Receiving broad spectrum antibiotics. -ID is consulted.       2. Coffee-ground emesis  -GI following. -EGD 9/8/21 with mild erosive esophagitis, hiatal hernia, patchy gastritis. Biopsies pending.     -Has mild anemia at present, monitor H&H.        3.  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  -Notified Dr. Mike Bartholomew of admission. -ENT is following.         4. Thoracic compression fracture  -Plan for kyphoplasty, recommend biopsy at same time. -Kyphoplasty on hold today.         5. Intractable back pain  -Continue oxycodone PRN       [facility]  [facility]  [phone]    Patient was seen and examined. Agree with above. Believe biopsy from the lesion on the spine which may be done at the time of kyphoplasty.     Gerri Casper MD

## 2021-09-10 NOTE — PROGRESS NOTES
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 When I saw the patient in consultation in November 2020, patient declined postoperative radiation. Patient was followed by Dr. Dex Carreon. Recurrent disease was noted endoscopically in April 2021. PET scan May 2021 showed no other foci of hypermetabolism other than the right nasal cavity. Those images were reviewed. Hypermetabolism in the spine was not present four months ago, even in retrospect. Radiation to a dose of 6000 cGy in 30 fractions was given June/July 2021 to the paranasal sinuses to try to cure the patient of his neoplasm. I saw the patient most recently August 10 and he had been doing well clinically. In the interim, patient has developed issues with recurrent nosebleed and severe back pain. Patient is now hospitalized and imaging suggests interval development of skeletal metastases with pathologic fracture at T4. I agree tissue diagnosis is warranted.       Osiris Strong MD

## 2021-09-10 NOTE — PROGRESS NOTES
Comprehensive Nutrition Assessment    Type and Reason for Visit:  Initial    Nutrition Recommendations/Plan:   1. Ensure Clear TID  2. As tolerated / as appropriate adv diet to low fiber    Nutrition Assessment:  Pt triggered nutrition eval for admission MST of 2, indicating wt loss and poor po intake. Wife reports UBW fluctuates between seasons. UBW in the winter is 197 lb and in the summer is 187lb. Pt lost some additional wt back in July during radiation. CBW is 184lb. Overall no significant wt loss. Current diet is clear liquid, pt tolerating small amounts; c/o indigestion. Agreed to try Ensure Clear and states he usually takes Premier Protein drinks at home. Will begin supplement and follow for diet advancement and tolerance. Malnutrition Assessment:  Malnutrition Status:  No malnutrition        Estimated Daily Nutrient Needs:  Energy (kcal):  1680 - 2100; Weight Used for Energy Requirements:  Current (84 kg)     Protein (g):  95 - 110; Weight Used for Protein Requirements:  Ideal (1.3 - 1.5g/73kg)        Fluid (ml/day):   ; Method Used for Fluid Requirements:  1 ml/kcal      Nutrition Related Findings:  9/8 EGD: mild erosive esophagitis hiatal hernia and patchy gastritis; c/o constipation; hypoactive BS; edema: +2 BLE, LUE      Wounds:  None       Current Nutrition Therapies:    ADULT DIET; Clear Liquid  Adult Oral Nutrition Supplement; Clear Liquid Oral Supplement    Anthropometric Measures:  · Height: 5' 9\" (175.3 cm)  · Current Body Weight: 184 lb (83.5 kg)   · Ideal Body Weight: 160 lbs; % Ideal Body Weight 115 %   · BMI: 27.2  · BMI Categories: Overweight (BMI 25.0-29. 9)       Nutrition Diagnosis:   · Inadequate oral intake related to altered GI function (indigestion & constipation) as evidenced by intake 0-25%, intake 26-50%      Nutrition Interventions:   Food and/or Nutrient Delivery:  Continue Current Diet, Start Oral Nutrition Supplement  Nutrition Education/Counseling:  Education not indicated   Coordination of Nutrition Care:  Continue to monitor while inpatient    Goals:  PO intake 50% or greater       Nutrition Monitoring and Evaluation:   Behavioral-Environmental Outcomes:  None Identified   Food/Nutrient Intake Outcomes:  Diet Advancement/Tolerance, Food and Nutrient Intake, Supplement Intake  Physical Signs/Symptoms Outcomes:  Constipation     Discharge Planning:     Too soon to determine       Electronically signed by Kia Grady RD, LD on 9/10/21 at 3:01 PM EDT  Contact: [phone]

## 2021-09-10 NOTE — PROGRESS NOTES
Infectious Diseases   Progress Note      Admission Date: 9/7/2021  Hospital Day: Hospital Day: 4   Attending: Apurva Mcnamara MD  Date of service: 9/10/2021     Chief complaint/ Reason for consult:     · Sepsis with tachycardia, tachypnea, hypotension worsening leukocytosis  · Spinal metastatic disease  · Vertebral fracture at T4 level  · Fatty liver disease  · Hyponatremia    Microbiology:        I have reviewed allavailable micro lab data and cultures    · Blood culture (2/2) - collected on 9/7/2021: In process  · Urine culture  - collected on 9/7/2021: In process      Antibiotics and immunizations:       Current antibiotics: All antibiotics and their doses were reviewed by me    Recent Abx Admin                   cefepime (MAXIPIME) 2000 mg IVPB minibag (mg) 2,000 mg New Bag 09/10/21 1049     2,000 mg New Bag  0039     2,000 mg New Bag 09/09/21 1708    vancomycin 1000 mg IVPB in 250 mL D5W addavial (mg) 1,000 mg New Bag 09/10/21 0825     1,000 mg New Bag 09/09/21 2008    metronidazole (FLAGYL) 500 mg in NaCl 100 mL IVPB premix (mg) 500 mg New Bag 09/10/21 0529     500 mg New Bag 09/09/21 2247     500 mg New Bag  1428                  Immunization History: All immunization history was reviewed by me today. Immunization History   Administered Date(s) Administered    Tdap (Boostrix, Adacel) 09/16/2019       Known drug allergies: All allergies were reviewed and updated    Allergies   Allergen Reactions    Fish Oil Anaphylaxis    Shellfish Allergy Anaphylaxis    Lisinopril      Other reaction(s): Cough       Social history:     Social History:  All social andepidemiologic history was reviewed and updated by me today as needed. · Tobacco use:   reports that he has never smoked. He has never used smokeless tobacco.  · Alcohol use:   reports no history of alcohol use. · Currently lives in: [address]  ·  reports no history of drug use.      COVID VACCINATION AND LAB RESULT RECORDS: Internal Administration   First Dose      Second Dose           Last COVID Lab SARS-CoV-2 (no units)   Date Value   04/08/2021 Not Detected            Assessment:     The patient is a 67 y.o. old male who  has a past medical history of AAA (abdominal aortic aneurysm) ([facility] [address] CAD (coronary artery disease), Cancer ([facility] [address] Hyperlipidemia, Hypertension, and Nasal bleeding. with following problems:    · Sepsis with tachycardia, tachypnea, hypotension worsening leukocytosis-improving  · Spinal metastatic disease-ongoing  · Vertebral fracture at T4 level  · Fatty liver disease  · Hyponatremia -serum sodium is 131  · Coronary artery disease  · Essential hypertension  · Abdominal aortic aneurysm  · Hyperlipidemia      Discussion:      The patient is afebrile. She is on empiric IV vancomycin and cefepime and Flagyl. White cell count is 19,300 today. Serum creatinine 0.7. Nasal MRSA probe is negative    The patient had a CT scan of head and soft tissue of the neck without contrast done yesterday. It showed a chronic pansinusitis with some acute inflammation. Stomach biopsy tissue showed antral mucosa with mild reactive changes. Plan:     Diagnostic Workup:      · Continue to follow  fever curve, WBC count and blood cultures. · Continue to monitor blood counts, liver and renal function. Antimicrobials:    · Will continue IV cefepime 2 g every 8 hours  · Continue IV Flagyl 500 mg every 8 hours  · No evidence of MRSA. We will stop IV vancomycin today  · We will follow up on the culture results and clinical progress and will make further recommendations accordingly. · Continue close vitals monitoring. · Maintain good glycemic control. · Fall precautions. Aspiration precautions. · Continue to watch for new fever or diarrhea. · DVT prophylaxis. · Discussed all above with patient and RN.   · Discussed with his wife at bedside      Drug Monitoring:    · Continue monitoring for antibiotic toxicity as follows: CBC, CMP   · Continue to watch for following: new or worsening fever, new hypotension, hives, lip swelling and redness or purulence at vascular access sites. I/v access Management:    · Continue to monitor i.v access sites for erythema, induration, discharge or tenderness. · As always, continue efforts to minimize tubes/lines/drains as clinically appropriate to reduce chances of line associated infections. Patient education and counseling:        · The patient was educated in detail about the side-effects of various antibiotics and things to watch for like new rashes, lip swelling, severe reaction, worsening diarrhea, break through fever etc.  · Discussed patient's condition and what to expect. All of the patient's questions were addressed in a satisfactory manner and patient verbalized understanding all instructions. Level of complexity of visit: High     Risk of Complications/Morbidity: High     · Illness(es)/ Infection present that pose threat to life/bodily function. · There is potential for severe exacerbation of infection/side effects of treatment. · Therapy requires intensive monitoring for antimicrobial agent toxicity. TIME SPENT TODAY:     - Spent over  36 minutes on visit (including interval history, physical exam, review of data including labs, cultures, imaging, development and implementation of treatment plan and coordination of complex care). More than 50 percent of this includes face-to-face time spent with the patient for counseling and coordination of care. Thank you for involving me in the care of your patient. I will continue to follow. If you have anyadditional questions, please do not hesitate to contact me. Subjective: Interval history: Interval history was obtained from chart review and patient/ RN. The patient is afebrile. He seems to be tolerating antibiotics okay.   No diarrhea     REVIEW OF SYSTEMS:     Review of Systems   Constitutional: Negative for chills, diaphoresis and fever. HENT: Negative for ear discharge, ear pain, rhinorrhea, sore throat and trouble swallowing. Eyes: Negative for discharge and redness. Respiratory: Negative for cough, shortness of breath and wheezing. Cardiovascular: Negative for chest pain and leg swelling. Gastrointestinal: Negative for abdominal pain, constipation, diarrhea and nausea. Endocrine: Negative for polyuria. Genitourinary: Negative for dysuria, flank pain, frequency, hematuria and urgency. Musculoskeletal: Positive for back pain. Negative for myalgias. Skin: Negative for rash. Neurological: Negative for dizziness, seizures and headaches. Hematological: Does not bruise/bleed easily. Psychiatric/Behavioral: Negative for hallucinations and suicidal ideas. All other systems reviewed and are negative. Past Medical History: All past medical history reviewed today. Past Medical History:   Diagnosis Date    AAA (abdominal aortic [facility]) ([facility] [address]     CAD (coronary artery disease)     stents 2017    Cancer ([facility] [address]     Hyperlipidemia     Hypertension     Nasal bleeding     worse since covid making the mask mandatory       Past Surgical History: All past surgical history was reviewed today. Past Surgical History:   Procedure Laterality Date    CARDIAC SURGERY  2017    stents placed     COLONOSCOPY      SINUS ENDOSCOPY N/A 11/2/2020    FUNCTIONAL ENDOSCOPIC SINUS SURGERY performed by Monalisa Mendes MD at [address] [place] [address] 4/12/2021    RIGHT FUNCTIONAL ENDOSCOPIC SINUS SURGERY ([id number], [address] [place] [address]) performed by Monalisa Mendes MD at [facility] 9/8/2021    EGD GASTRIC BIOPSY performed by Tamra Chua MD at [address] [place] [address]       Family History: All family history was reviewed today.         Problem Relation Age of Onset    Other Brother         anurysim       Objective:       PHYSICAL EXAM:      Vitals:   Vitals: is alert and oriented to person, place, and time. Mental status is at baseline. Motor: No abnormal muscle tone. Psychiatric:         Mood and Affect: Mood normal.         Behavior: Behavior normal.         Thought Content: Thought content normal.            Lines and drains: All vascular access sites are healthy with no local erythema, discharge or tenderness. Intake and output:    I/O last 3 completed shifts: In: 2010.3 [P.O.:360; IV Piggyback:1650.3]  Out: 425 [Urine:425]    Lab Data:   All available labs and old records have been reviewed by me. CBC:  Recent Labs     09/08/21 0429 09/08/21  1823 09/09/21  0232 09/09/21  0232 09/09/21  1029 09/09/21  2129 09/10/21  0418   WBC 20.4*  --  22.3*  --   --   --  19.3*   RBC 4.52  --  4.31  --   --   --  4.02*   HGB 12.6*   < > 12.0*   < > 11.6* 11.9* 11.2*   HCT 37.4*   < > 35.9*   < > 35.1* 35.8* 33.6*     --  332  --   --   --  346   MCV 82.6  --  83.2  --   --   --  83.6   MCH 27.9  --  27.9  --   --   --  27.9   MCHC 33.8  --  33.6  --   --   --  33.3   RDW 13.4  --  13.5  --   --   --  13.5    < > = values in this interval not displayed. BMP:  Recent Labs     09/08/21  0429 09/09/21  0232 09/10/21  0418   * 130* 131*   K 4.3 4.3 4.1    99 100   CO2 21 19* 20*   BUN 21* 19 15   CREATININE 0.7* 0.8 0.7*   CALCIUM 9.1 9.1 8.8   GLUCOSE 196* 156* 127*        Hepatic Function Panel:   Lab Results   Component Value Date    ALKPHOS 128 09/07/2021    ALT 22 09/07/2021    AST 18 09/07/2021    PROT 8.0 09/07/2021    BILITOT 0.6 09/07/2021    LABALBU 4.4 09/07/2021       CPK: No results found for: CKTOTAL  ESR: No results found for: SEDRATE  CRP: No results found for: CRP        Imaging: All pertinent images and reports for the current visit were reviewed by me during this visit. XR ABDOMEN (2 VIEWS)   Final Result   Mild generalized ileus pattern. Low colonic obstruction is a less likely   differential possibility.       Mild prominence of stool in the ascending colon. Possible distention of the urinary bladder. Clinical correlation is   suggested. If necessary, follow-up ultrasound may be obtained. CT SOFT TISSUE NECK WO CONTRAST   Final Result   Chronic or acute on chronic pansinusitis. The distribution is similar to the   CT PET of 05/17/2021. Superimposed acute components of inflammation may be   present, similar to the previous study. Nodular soft tissue density in the   right nasal passage is slightly decreased. No adenopathy or acute inflammatory abnormality is otherwise identified. CT HEAD WO CONTRAST   Preliminary Result   1. No evidence of acute intracranial abnormality. 2. Paranasal sinus disease in this patient status post interval right-sided   paranasal sinus surgery when compared to 10/16/2020.   3. Interval development of bilateral mastoid sinus disease with new interval   opacification of middle ear cavities bilaterally. US GALLBLADDER RUQ   Final Result   Liver is heterogeneous in appearance and increased in echogenicity compatible   with diffuse hepatocellular disease, hepatic steatosis. Subtle findings of   the liver are limited due to body habitus. Please refer to CT performed same   day for further evaluation. The gallbladder appears unremarkable in appearance. CT THORACIC SPINE TRAUMA RECONSTRUCTION   Final Result   Findings consistent with metastatic disease and pathologic fracture at T4. The patient is likely a candidate for kyphoplasty and radiofrequency   ablation. Interventional radiology consult is suggested. Lucent lesions at T9 and L4 most likely represent Schmorl's node deformities,   however metastatic disease is difficult to exclude. CT LUMBAR SPINE WO CONTRAST   Final Result   Findings consistent with metastatic disease and pathologic fracture at T4. The patient is likely a candidate for kyphoplasty and radiofrequency   ablation. Interventional radiology consult is suggested. Lucent lesions at T9 and L4 most likely represent Schmorl's node deformities,   however metastatic disease is difficult to exclude. CTA Chest W WO Contrast   Final Result   1. No acute intrathoracic abnormality. 2. No acute intra-abdominal abnormality. CT ABDOMEN PELVIS W IV CONTRAST Additional Contrast? None   Final Result   1. No acute intrathoracic abnormality. 2. No acute intra-abdominal abnormality. Medications: All current and past medications were reviewed.      naloxegol  25 mg Oral QAM    oxyCODONE  5 mg Oral Q4H    Or    oxyCODONE  10 mg Oral Q4H    lidocaine 1 % injection  5 mL IntraDERmal Once    sodium chloride flush  5-40 mL IntraVENous 2 times per day    pantoprazole  40 mg Oral QAM AC    vancomycin  1,000 mg IntraVENous Q12H    atorvastatin  40 mg Oral Nightly    valsartan  80 mg Oral Daily    sodium chloride flush  5-40 mL IntraVENous 2 times per day    cefepime  2,000 mg IntraVENous Q8H    metroNIDAZOLE  500 mg IntraVENous Q8H    lidocaine  1 patch TransDERmal Daily        sodium chloride      sodium chloride      sodium chloride 150 mL/hr at 09/09/21 2007       sodium chloride flush, sodium chloride, promethazine, sodium chloride, melatonin, ondansetron, sodium chloride flush, sodium chloride, acetaminophen **OR** acetaminophen, morphine, hydrALAZINE, bisacodyl      Problem list:       Patient Active Problem List   Diagnosis Code    Subacute pansinusitis J01.40    Nasal polyposis J33.9    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 Although every effort was made to ensure the accuracy of this automated transcription, some errors in transcription may have occurred inadvertently. If you may need any clarification, please do not hesitate to contact me through EPIC or at the phone number provided below with my electronic signature. Any pictures or media included in this note were obtained after taking informed verbal consent from the patient and with their approval to include those in the patient's medical record.     Sandoval Yoder MD, MPH  9/10/2021 , 12:01 PM   [facility] Infectious Disease   [address] [place] [address], [address] [place] [address]  Office: [phone]  Fax: [phone]  Clinic days:  Tuesday & Thursday

## 2021-09-10 NOTE — PROGRESS NOTES
[facility] - ENT  PROGRESS  NOTE    Date of Service: 9/10/2021    ASSESSMENT: Thomas Rose is a very pleasant 67 y.o. 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 Presenting with upper back pain, noted to have pathological T4 fracture and lucent lesions at T9 and L4.       PLAN/RECOMMENDATIONS:     1. Cancer of nasal cavity and sinus ([address]  2. S/P radiation therapy  3. Thoracic Compression Fracture     Plan:  Concern for metastatic vertebral lesion given pathologic fracture. Agree with kyphoplasty and vertebral biopsy when possible to evaluate for possible metastatic spread of his known squamous cell carcinoma. If this is unable to be performed can consider biopsy of right posterior lateral nasal lesion noted on prior endoscopic examination at his last office visit  His wife brought his medicated sinus rinses which he will start in the hospital.  Continue nasal saline sprays. Oncology and radiation oncology following as well. Please do not hesitate to call ENT with any questions or concerns. SUBJECTIVE:   Continues to have back pain. His wife brought his medicated sinus rinses which she will plan on starting. Still having constipation, received a tap water enema but only had a little bit of stool come out afterwards. No recent nosebleeds. OBJECTIVE:  Physical Exam:   Temp (24hrs), Av.1 °F (36.7 °C), Min:97.9 °F (36.6 °C), Max:98.2 °F (36.8 °C)    Vitals:    09/10/21 1235 09/10/21 1319 09/10/21 1730 09/10/21 1851   BP: (!) 158/93   (!) 172/93   Pulse: 86   86   Resp: 18   16   Temp: 97.9 °F (36.6 °C)   98.2 °F (36.8 °C)   TempSrc: Oral   Oral   SpO2: 94%  94% 95%   Weight:       Height:  5' 9\" (1.753 m)       I/O last 3 completed shifts:   In: .3 [P.O.:360; IV Piggyback:1650.3]  Out: 425 [Urine:425]    REVIEW OF SYSTEMS  The following systems were reviewed and revealed the following in addition to any already discussed in the HPI:     CONSTITUTIONAL: no weight loss, no fever, no night sweats, no chills  EYES: no vision changes, no blurry vision  EARS: + bilateral hearing loss, no otalgia  NOSE: no epistaxis, no rhinorrhea  RESPIRATORY: no difficulty breathing, no shortness of breath  CV: no chest pain, no lower extremity swelling  HEME: no coagulation disorder, no known bleeding disorders  NEURO: no TIA or stroke-like symptoms  SKIN: no new rashes in the head and neck, no recently diagnosed skin cancers  MOUTH: no new oral ulcers, no recent tooth infections  GASTROINTESTINAL: no diarrhea, +stomach pain, + nausea, + constipation  PSYCH: no anxiety, no depression      GENERAL: No acute distress, alert and oriented, no hoarseness, on supplemental oxygen via nasal cannula, appears weak  EYES: EOMI, Anti-icteric  NOSE:  On anterior rhinoscopy nasal septum severely deviated to the left. Upon looking in the right nasal passage there is a dense amount of nasal crusting. No active epistaxis. EARS: Normal external appearance, some nose hearing loss noted on examination but able to answer questions effectively. No otorrhea. Hearing aids are not in place. FACE: HB 1/6 bilaterally, symmetric appearing, sensation equal bilaterally  ORAL CAVITY: No masses or lesions visualized or palpated, uvula is midline, moist mucous membranes. No active bleeding or blood clots in the oropharynx.   NECK: Normal range of motion, no thyromegaly, trachea is midline, no palpable lymphadenopathy or neck masses, no crepitus  CHEST: Normal respiratory effort, breathing comfortably, no retractions  SKIN: No rashes, normal appearing skin, no evidence of skin lesions/tumors  NEURO: Cranial Nerves 2, 3, 4, 5, 6, 7, 11, 12 grossly intact bilaterally     Lab Studies:  Lab Results   Component Value Date    WBC 19.3 (H) 09/10/2021    HGB 11.0 (L) 09/10/2021    HCT 32.7 (L) 09/10/2021    MCV 83.6 09/10/2021     09/10/2021     Lab Results   Component Value Date    GLUCOSE 127 (H) 09/10/2021    BUN 15 09/10/2021    CREATININE 0.7 (L) 09/10/2021    K 4.1 09/10/2021     (L) 09/10/2021     09/10/2021    CALCIUM 8.8 09/10/2021     Lab Results   Component Value Date    MG 2.10 09/10/2021     Lab Results   Component Value Date    PHOS 2.1 (L) 09/10/2021     Lab Results   Component Value Date    ALKPHOS 128 09/07/2021    ALT 22 09/07/2021    AST 18 09/07/2021    BILITOT 0.6 09/07/2021    PROT 8.0 09/07/2021

## 2021-09-10 NOTE — PROGRESS NOTES
[facility]   Progress Note      SUBJECTIVE:      Continues to feel weak. Has sinus pressure  No fever or chills. Constipation +    OBJECTIVE    Physical  VITALS:  BP (!) 168/105   Pulse 87   Temp 98 °F (36.7 °C) (Oral)   Resp 16   Ht 5' 9\" (1.753 m)   Wt 184 lb 1.6 oz (83.5 kg)   SpO2 98%   BMI 27.19 kg/m²   TEMPERATURE:  Current - Temp: 98 °F (36.7 °C); Max - Temp  Av.1 °F (36.7 °C)  Min: 98 °F (36.7 °C)  Max: 98.2 °F (36.8 °C)  BLOOD PRESSURE RANGE:  Systolic (14CFZ), UHO:115 , Min:151 , QPZ:159   ; Diastolic (88GOP), FPW:55, Min:76, Max:105    24HR INTAKE/OUTPUT:    Intake/Output Summary (Last 24 hours) at 9/10/2021 1221  Last data filed at 2021 2249  Gross per 24 hour   Intake 2010.31 ml   Output 425 ml   Net 1585.31 ml       Conscious alert and oriented. Mild facial erythema noted. Respiratory efforts are normal.  Abdomen is soft bowel sounds are heard. Mild distention noted hypogastric tenderness noted no rebound. Extremities no edema.     Data  Labs:  General Labs:  CBC with Differential:    Lab Results   Component Value Date    WBC 19.3 09/10/2021    RBC 4.02 09/10/2021    HGB 11.2 09/10/2021    HCT 33.6 09/10/2021     09/10/2021    MCV 83.6 09/10/2021    MCH 27.9 09/10/2021    MCHC 33.3 09/10/2021    RDW 13.5 09/10/2021    LYMPHOPCT 4.5 09/10/2021    MONOPCT 9.0 09/10/2021    BASOPCT 0.4 09/10/2021    MONOSABS 1.7 09/10/2021    LYMPHSABS 0.9 09/10/2021    EOSABS 0.1 09/10/2021    BASOSABS 0.1 09/10/2021     BMP:    Lab Results   Component Value Date     09/10/2021    K 4.1 09/10/2021    K 4.2 2021     09/10/2021    CO2 20 09/10/2021    BUN 15 09/10/2021    LABALBU 4.4 2021    CREATININE 0.7 09/10/2021    CREATININE 0.9 2020    CALCIUM 8.8 09/10/2021    GFRAA >60 09/10/2021    LABGLOM >60 09/10/2021    GLUCOSE 127 09/10/2021     Hepatic Function Panel:    Lab Results   Component Value Date    ALKPHOS 128 2021    ALT 22 2021 AST 18 09/07/2021    PROT 8.0 09/07/2021    BILITOT 0.6 09/07/2021    LABALBU 4.4 09/07/2021     LDH:  No results found for: LDH  PT/INR:    Lab Results   Component Value Date    PROTIME 12.8 09/07/2021    INR 1.13 09/07/2021     PTT:    Lab Results   Component Value Date    APTT 31.6 09/07/2021   [APTT    Current Medications  Current Facility-Administered Medications: naloxegol (MOVANTIK) tablet 25 mg, 25 mg, Oral, QAM  oxyCODONE (ROXICODONE) immediate release tablet 5 mg, 5 mg, Oral, Q4H **OR** oxyCODONE (ROXICODONE) immediate release tablet 10 mg, 10 mg, Oral, Q4H  lidocaine PF 1 % injection 5 mL, 5 mL, IntraDERmal, Once  sodium chloride flush 0.9 % injection 5-40 mL, 5-40 mL, IntraVENous, 2 times per day  sodium chloride flush 0.9 % injection 5-40 mL, 5-40 mL, IntraVENous, PRN  0.9 % sodium chloride infusion, 25 mL, IntraVENous, PRN  pantoprazole (PROTONIX) tablet 40 mg, 40 mg, Oral, QAM AC  promethazine (PHENERGAN) injection 12.5 mg, 12.5 mg, IntraVENous, Q6H PRN  sodium chloride (OCEAN, BABY AYR) 0.65 % nasal spray 1 spray, 1 spray, Each Nostril, Q6H PRN  melatonin tablet 3 mg, 3 mg, Oral, Nightly PRN  ondansetron (ZOFRAN) injection 4 mg, 4 mg, IntraVENous, Q6H PRN  atorvastatin (LIPITOR) tablet 40 mg, 40 mg, Oral, Nightly  valsartan (DIOVAN) tablet 80 mg, 80 mg, Oral, Daily  sodium chloride flush 0.9 % injection 5-40 mL, 5-40 mL, IntraVENous, 2 times per day  sodium chloride flush 0.9 % injection 5-40 mL, 5-40 mL, IntraVENous, PRN  0.9 % sodium chloride infusion, 25 mL, IntraVENous, PRN  acetaminophen (TYLENOL) tablet 650 mg, 650 mg, Oral, Q6H PRN **OR** acetaminophen (TYLENOL) suppository 650 mg, 650 mg, Rectal, Q6H PRN  0.9 % sodium chloride infusion, , IntraVENous, Continuous  cefepime (MAXIPIME) 2000 mg IVPB minibag, 2,000 mg, IntraVENous, Q8H  morphine (PF) injection 2 mg, 2 mg, IntraVENous, Q4H PRN  metronidazole (FLAGYL) 500 mg in NaCl 100 mL IVPB premix, 500 mg, IntraVENous, Q8H  hydrALAZINE (APRESOLINE) injection 10 mg, 10 mg, IntraVENous, Q6H PRN  lidocaine 4 % external patch 1 patch, 1 patch, TransDERmal, Daily  bisacodyl (DULCOLAX) EC tablet 5 mg, 5 mg, Oral, Daily PRN    ASSESSMENT AND PLAN    79-year-old gentleman with history of AAA, CAD, hypertension has    1. 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  -Dr. Carmen Gordon is the treating radiation oncologist.  -ENT is following. 2.  Thoracic compression fracture:    -There is some suspicion for metastatic disease  -Plan is for kyphoplasty after infection settles. At that time biopsy from the spine will be obtained. 3.  Leukocytosis, sepsis:    -Receiving broad-spectrum antibiotics  -ID is following    4. Coffee-ground emesis:    -EGD 9/8/2021 showed mild erosive esophagitis hiatal hernia and patchy gastritis. -Hemoglobin is 11.2 today    5.   Constipation      Balwinder Maciel MD

## 2021-09-10 NOTE — PROGRESS NOTES
Verna sent to Dr. Latrice Ochoa:   Dr Aamir Tamayo has ordered a DL PICC. WBC is 19.3 today. BC from 9/7 show no growth to date. Is it ok to place today? Will you need a DL placed? 1179 Response:    Blood cx neg.  ok to proceed

## 2021-09-10 NOTE — PLAN OF CARE
Nutrition Problem #1: Inadequate oral intake  Intervention: Food and/or Nutrient Delivery: Continue Current Diet, Start Oral Nutrition Supplement  Nutritional Goals: PO intake 50% or greater

## 2021-09-10 NOTE — PROGRESS NOTES
Hospitalist Progress Note      PCP: Alycia Angel MD    Date of Admission: 9/7/2021    Chief Complaint: Back pain    Hospital Course: 67 y.o. 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 Patient states that the pain has been there for the past several weeks. Has been addressed with PMD who was concerned for cardiac etiology. Patient was seen at [address] [place] [address] on 8/31/2021 at which time he was evaluated with EKG troponins CT chest and nuclear stress test which did not reveal etiology. Patient was also supposed to get gallbladder ultrasound to rule out biliary etiology of this pain however opted to do it outpatient. Patient presents today to [facility] ER again with intractable back pain. Muscle relaxants prescribed at [facility] are not helping. Per patient spouse have all supply of pain medications from back when he had surgeries and has been managing with taking 1 pill a day. At the ED today CT chest shows metastatic disease and pathologic fracture of T4. Lucent lesions at T9 and L4 are also noted. Patient with marked leukocytosis of 21,000 with elevated lactate at 4.1 with resulting metabolic acidosis with anion gap with respiratory compensation. Subjective: Patient seen and examined at bedside.         Medications:  Reviewed    Infusion Medications    sodium chloride      sodium chloride      sodium chloride 150 mL/hr at 09/10/21 7267     Scheduled Medications    naloxegol  25 mg Oral QAM    lidocaine 1 % injection  5 mL IntraDERmal Once    sodium chloride flush  5-40 mL IntraVENous 2 times per day    pantoprazole  40 mg Oral QAM AC    atorvastatin  40 mg Oral Nightly    valsartan  80 mg Oral Daily    sodium chloride flush  5-40 mL IntraVENous 2 times per day    cefepime  2,000 mg IntraVENous Q8H    metroNIDAZOLE  500 mg IntraVENous Q8H    lidocaine  1 patch TransDERmal Daily     PRN Meds: sodium chloride flush, sodium chloride, oxyCODONE, oxyCODONE, promethazine, sodium chloride, melatonin, ondansetron, sodium chloride flush, sodium chloride, acetaminophen **OR** acetaminophen, morphine, hydrALAZINE, bisacodyl      Intake/Output Summary (Last 24 hours) at 9/10/2021 1921  Last data filed at 9/10/2021 1828  Gross per 24 hour   Intake 2960.31 ml   Output 425 ml   Net 2535.31 ml       Physical Exam Performed:    BP (!) 172/93   Pulse 86   Temp 98.2 °F (36.8 °C) (Oral)   Resp 16   Ht 5' 9\" (1.753 m)   Wt 184 lb 1.6 oz (83.5 kg)   SpO2 95%   BMI 27.19 kg/m²     General appearance: No apparent distress, appears stated age and cooperative. HEENT: Pupils equal, round, and reactive to light. Conjunctivae/corneas clear. Neck: Supple, with full range of motion. No jugular venous distention. Trachea midline. Respiratory:  Normal respiratory effort. Clear to auscultation, bilaterally without Rales/Wheezes/Rhonchi. Cardiovascular: Regular rate and rhythm with normal S1/S2 without murmurs, rubs or gallops. Abdomen: Soft, non-tender, non-distended with normal bowel sounds. Musculoskeletal: No clubbing, cyanosis or edema bilaterally. Full range of motion without deformity. Skin: Skin color, texture, turgor normal.  No rashes or lesions. Neurologic:  Neurovascularly intact without any focal sensory/motor deficits.  Cranial nerves: II-XII intact, grossly non-focal.  Psychiatric: Alert and oriented, thought content appropriate, normal insight  Capillary Refill: Brisk,< 3 seconds   Peripheral Pulses: +2 palpable, equal bilaterally       Labs:   Recent Labs     09/08/21  0429 09/08/21  1823 09/09/21  0232 09/09/21  1029 09/09/21  2129 09/10/21  0418 09/10/21  1207   WBC 20.4*  --  22.3*  --   --  19.3*  --    HGB 12.6*   < > 12.0*   < > 11.9* 11.2* 11.0*   HCT 37.4*   < > 35.9*   < > 35.8* appearance and increased in echogenicity compatible   with diffuse hepatocellular disease, hepatic steatosis. Subtle findings of   the liver are limited due to body habitus. Please refer to CT performed same   day for further evaluation. The gallbladder appears unremarkable in appearance. CT THORACIC SPINE TRAUMA RECONSTRUCTION   Final Result   Findings consistent with metastatic disease and pathologic fracture at T4. The patient is likely a candidate for kyphoplasty and radiofrequency   ablation. Interventional radiology consult is suggested. Lucent lesions at T9 and L4 most likely represent Schmorl's node deformities,   however metastatic disease is difficult to exclude. CT LUMBAR SPINE WO CONTRAST   Final Result   Findings consistent with metastatic disease and pathologic fracture at T4. The patient is likely a candidate for kyphoplasty and radiofrequency   ablation. Interventional radiology consult is suggested. Lucent lesions at T9 and L4 most likely represent Schmorl's node deformities,   however metastatic disease is difficult to exclude. CTA Chest W WO Contrast   Final Result   1. No acute intrathoracic abnormality. 2. No acute intra-abdominal abnormality. CT ABDOMEN PELVIS W IV CONTRAST Additional Contrast? None   Final Result   1. No acute intrathoracic abnormality. 2. No acute intra-abdominal abnormality.                  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  GI consultedEGD     Severe sepsis with lactic acidosis  Most likely source upper respiratory infection  Broad-spectrum antibiotics including anaerobic coverage, however, WBC increasing  We will consult ID  Follow-up blood cultures  IV fluids     Anion gap acidosis  Likely secondary to elevated lactic acid  Resolved     Accelerated hypertensionimproved in view of pain  Continue home dose of valsartan  IV hydralazine as needed     History of coronary heart disease  Recent stress test showed no ischemia  Unable to take antiplatelets in view of epistaxis     Squamous cell carcinoma of the sinus  Metastatic  ENT and heme-onc consulted    DVT Prophylaxis: No pharmacologic anticoagulation since admission given patient's history of epistaxis and recommendation against anticoagulation by ENT, additionally with coffee-ground emesis  Diet: ADULT DIET;  Clear Liquid  Adult Oral Nutrition Supplement; Clear Liquid Oral Supplement  Code Status: DNR-CCA    Electronically signed by Olga Dejesus MD on 9/10/2021 at 7:21 PM

## 2021-09-10 NOTE — PROGRESS NOTES
Gastroenterology Progress Note    Jack Lewis is a 67 y.o. male patient. 1. Pathologic thoracic fracture, initial encounter    2. Septicemia ([address]    3. Hypertensive urgency        Admission Date: 2021  Hospital Day: Hospital Day: 4  Attending: Guy Kiser MD  Date of service: 9/10/21    SUBJECTIVE:    No BM for at least 4 days  Having back and shoulder pain   Tolerating liquids    ROS:  Cardiovascular ROS: no chest pain or dyspnea on exertion  Gastrointestinal ROS: see above  Respiratory ROS: no cough, shortness of breath, or wheezing    Physical    VITALS:  BP (!) 168/105   Pulse 87   Temp 98 °F (36.7 °C) (Oral)   Resp 16   Ht 5' 9\" (1.753 m)   Wt 184 lb 1.6 oz (83.5 kg)   SpO2 98%   BMI 27.19 kg/m²   TEMPERATURE:  Current - Temp: 98 °F (36.7 °C); Max - Temp  Av.1 °F (36.7 °C)  Min: 98 °F (36.7 °C)  Max: 98.2 °F (36.8 °C)    NAD  RRR, Nl s1s2  Lungs CTA Bilaterally, normal effort  Abdomen soft, diffusely tender, no rebound or guarding  AAOx3, No asterixis     Data      CBC:   Recent Labs     21  0429 21  1823 21  0232 21  0232 21  1029 21  2129 09/10/21  0418   WBC 20.4*  --  22.3*  --   --   --  19.3*   RBC 4.52  --  4.31  --   --   --  4.02*   HGB 12.6*   < > 12.0*   < > 11.6* 11.9* 11.2*   HCT 37.4*   < > 35.9*   < > 35.1* 35.8* 33.6*     --  332  --   --   --  346   MCV 82.6  --  83.2  --   --   --  83.6   MCH 27.9  --  27.9  --   --   --  27.9   MCHC 33.8  --  33.6  --   --   --  33.3   RDW 13.4  --  13.5  --   --   --  13.5    < > = values in this interval not displayed.         BMP:  Recent Labs     21  0429 21  0232 09/10/21  0418   * 130* 131*   K 4.3 4.3 4.1    99 100   CO2 21 19* 20*   BUN 21* 19 15   CREATININE 0.7* 0.8 0.7*   CALCIUM 9.1 9.1 8.8   GLUCOSE 196* 156* 127*        Hepatic Function Panel:   Recent Labs     21  1315   AST 18   ALT 22   BILITOT 0.6   ALKPHOS 128       Recent Labs 09/07/21  1315   LIPASE 19.0   AMYLASE 61     Recent Labs     09/07/21  1315   PROTIME 12.8*   INR 1.13*     No results for input(s): PTT in the last 72 hours. No results for input(s): OCCULTBLD in the last 72 hours. Radiology Review:    US GALLBLADDER RUQ   Final Result   Liver is heterogeneous in appearance and increased in echogenicity compatible   with diffuse hepatocellular disease, hepatic steatosis. Subtle findings of   the liver are limited due to body habitus. Please refer to CT performed same   day for further evaluation. The gallbladder appears unremarkable in appearance. CT THORACIC SPINE TRAUMA RECONSTRUCTION   Final Result   Findings consistent with metastatic disease and pathologic fracture at T4. The patient is likely a candidate for kyphoplasty and radiofrequency   ablation. Interventional radiology consult is suggested. Lucent lesions at T9 and L4 most likely represent Schmorl's node deformities,   however metastatic disease is difficult to exclude. CT LUMBAR SPINE WO CONTRAST   Final Result   Findings consistent with metastatic disease and pathologic fracture at T4. The patient is likely a candidate for kyphoplasty and radiofrequency   ablation. Interventional radiology consult is suggested. Lucent lesions at T9 and L4 most likely represent Schmorl's node deformities,   however metastatic disease is difficult to exclude. CTA Chest W WO Contrast   Final Result   1. No acute intrathoracic abnormality. 2. No acute intra-abdominal abnormality. CT ABDOMEN PELVIS W IV CONTRAST Additional Contrast? None   Final Result   1. No acute intrathoracic abnormality. 2. No acute intra-abdominal abnormality.          CT HEAD WO CONTRAST    (Results Pending)   CT SOFT TISSUE NECK WO CONTRAST    (Results Pending)   XR ABDOMEN (2 VIEWS)    (Results Pending)         ASSESSMENT   Coffee-ground emesis/anemia -patient acute onset of nausea, vomiting and coffee-ground emesis 9/8. CT abdomen pelvis without source.  No recent epistaxis.  EGD 9/8 - Mild esophagitis, small HH, coffee ground material and food in fundus, limiting endoscopic view, patchy gastritis (biopsied). Hb stable 12+  sepsis -Felt to be from sinusitis. On IV antibiotics. ID consulted. T4 pathologic fracture concerning for metastatic disease  H/o SCC of the sinuses     PLAN    IV PPI   Abd XR to check for ileus or constipation  Start Movantik, I suspect there is a component of narcotic bowel syndrome    ADDENDUM   Mild generalized ileus pattern.  Low colonic obstruction is a less likely   differential possibility.       Mild prominence of stool in the ascending colon.       Possible distention of the urinary bladder.  Clinical correlation is   suggested.  If necessary, follow-up ultrasound may be obtained.      Plan   Bladder scan, may need straight cath   Water enema until clear   Once ileus resolved, may give 2 liter golytely for bowel evacuation    Bay Srivastava MD, MSc  [facility]  09/10/21

## 2021-09-10 NOTE — PROGRESS NOTES
Arrived to the pt room with bedside RN Yousuf Whittington. Timeout completed. Answered patient questions prior to procedure. Able to access R basilic vein with no difficulty. Catheter confirmed in distal SVC with tip locating device/ecg. PICC dressed with biopatch and pt instructed on care. Pt tolerated well. Bed returned to lowest locked position, call light in reach. Reported off to bedside RN.

## 2021-09-10 NOTE — PROGRESS NOTES
Shift assessment see flow sheets meds per orders see eMAR. Patient alert and oriented x4, PRN pain meds for pain, effective at this time. Patient has denied nausea so far through shift. The care plan and education has been reviewed and mutually agreed upon with the patient. Patient remains free from falls. All fall precautions in place. Yellow blanket at bedside, yellow bracelet on patient. SAFE sign on door. Bed and chair alarms being used. Bed in lowest position. Will monitor.      Electronically signed by Patti Burgess RN on 9/10/2021 at 2:01 AM

## 2021-09-11 NOTE — CONSULTS
[facility]                     [facility], [address]                                  CONSULTATION    PATIENT NAME: Rosette Krishnan                   :        1945  MED REC NO:   [id number]                          ROOM:       3787  ACCOUNT NO:   [de-identified]                           ADMIT DATE: 2021  PROVIDER:     Jose Morse MD    CONSULT DATE:  2021    CONSULTING PHYSICIAN:  Christelle Sorensen MD    REASON FOR CONSULTATION:  Urinary retention. HISTORY OF PRESENT ILLNESS:  The patient is a 69-year-old man admitted  to the hospital with hypertension and pain. 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 He denies any urological issues prior to coming to the hospital, denies  any troubles voiding. No history of any dysuria, has had no prostate  surgery, has not seen urologist and was not taking any prostate  medicines. He was admitted to the hospital on 2021 in the  morning, but then began having some increasing pain in the suprapubic  area on the morning of 2021. A bladder scan showed significant  bladder distention. He was straight cath for over 1000 mL and because  of this urological consultation was made. The patient had a normal  creatinine. He had a CAT scan of the abdomen and pelvis on admission  that showed normal appearing kidneys with no hydronephrosis, mass and no  signs of any obvious bladder distention. A urinalysis on admission was  also clear with no _____ signs of infection, but because of the  retention, urology consultation was made. PAST MEDICAL HISTORY:  Remarkable primarily for cancer of the sinuses  which he has been getting treatment I believe chemo and radiation. He  has had history of coronary artery disease, elevated lipids and  hypertension.     PAST SURGICAL HISTORY:  He had cardiac stents placed in 2017 for  coronary artery disease and has had endoscopic sinus surgery several  times by Dr. Ramona Blackwood I believe for his cancer. MEDICATIONS:  Please see history of present illness for medication list.  He is not taking any prostate medications. ALLERGIES:  He has an allergy to FISH OIL, SHELLFISH and LISINOPRIL. SOCIAL HISTORY:  Lives at home with his wife. He is a nonsmoker. FAMILY HISTORY:  Negative for prostate cancer. REVIEW OF SYSTEMS:  Please see history of present illness for review of  systems. Most recently, he has had some suprapubic pain consistent with  bladder distention. No dysuria. No frequency. No incontinence. No  gross hematuria. PHYSICAL EXAMINATION:  GENERAL:  The patient was seen and examined on the morning of  09/11/2021. He was resting comfortably. Alert and oriented, totally  cooperative. VITAL SIGNS:  Temperature 98, pulse 82, respirations 20, blood pressure  135/75. HEENT:  Head was normocephalic, atraumatic. NECK:  Soft. Trachea was in midline. ABDOMEN:  Soft, nontender, nondistended. No CVA tenderness. No  inguinal lymphadenopathy. No signs of hernia. GENITALIA:  He has normal external genitalia. Scrotum is normal in  appearance. No erythema or edema. Testes descended bilaterally without  mass or tenderness. RECTAL:  Prostate feels smooth and benign with some enlargement. Normal  sphincter tone. IMPRESSION:  Prostatic hypertrophy with urinary retention. PLAN:  The patient was just straight cath'd for over 1000 mL. We will  go ahead and give him a Flomax now and start him on Flomax nightly. I  have instructed the nurse that if he continues to have issues with  retention, would probably just place a [place] for now and just leave it  for 24 to 48 hours and then remove it, may be give the Flomax a little  time to work.   Otherwise, if he is voiding well, we will just have him  discharged home on Flomax and then just have him follow up in the office  if needed.         Bre Land MD    D: 09/11/2021 11:37:19       T: 09/11/2021 13:12:09     [email or web address]  Job#: [id number]     Doc#: [id number]    CC:

## 2021-09-11 NOTE — PROGRESS NOTES
Infectious Diseases   Progress Note      Admission Date: 9/7/2021  Hospital Day: Hospital Day: 5   Attending: Price John MD  Date of service: 9/11/2021     Chief complaint/ Reason for consult:     · Sepsis with tachycardia, tachypnea, hypotension worsening leukocytosis  · Spinal metastatic disease  · Vertebral fracture at T4 level  · Fatty liver disease  · Hyponatremia    Microbiology:        I have reviewed allavailable micro lab data and cultures    · Blood culture (2/2) - collected on 9/7/2021: In process  · Urine culture  - collected on 9/7/2021: In process      Antibiotics and immunizations:       Current antibiotics: All antibiotics and their doses were reviewed by me    Recent Abx Admin                   cefepime (MAXIPIME) 2000 mg IVPB minibag (mg) 2,000 mg New Bag 09/11/21 1122     2,000 mg New Bag  0310     2,000 mg New Bag 09/10/21 1847    metronidazole (FLAGYL) 500 mg in NaCl 100 mL IVPB premix (mg) 500 mg New Bag 09/11/21 0644     500 mg New Bag 09/10/21 2113                  Immunization History: All immunization history was reviewed by me today. Immunization History   Administered Date(s) Administered    Tdap (Boostrix, Adacel) 09/16/2019       Known drug allergies: All allergies were reviewed and updated    Allergies   Allergen Reactions    Fish Oil Anaphylaxis    Shellfish Allergy Anaphylaxis    Lisinopril      Other reaction(s): Cough       Social history:     Social History:  All social andepidemiologic history was reviewed and updated by me today as needed. · Tobacco use:   reports that he has never smoked. He has never used smokeless tobacco.  · Alcohol use:   reports no history of alcohol use. · Currently lives in: [facility] [address]  ·  reports no history of drug use.      COVID VACCINATION AND LAB RESULT RECORDS:     Internal Administration   First Dose      Second Dose           Last COVID Lab SARS-CoV-2 (no units)   Date Value   04/08/2021 Not Detected Assessment:     The patient is a 67 y.o. old male who  has a past medical history of AAA (abdominal aortic aneurysm) ([address] CAD (coronary artery disease), Cancer ([address] Hyperlipidemia, Hypertension, and Nasal bleeding. with following problems:    · Sepsis with tachycardia, tachypnea, hypotension, leukocytosis-improving slowly  · Spinal metastatic disease--this is ongoing  · Vertebral fracture at T4 level-hoping for IR procedure next week  · Fatty liver disease  · Hyponatremia -being corrected  · Coronary artery disease  · Essential hypertension  · Abdominal aortic aneurysm  · Hyperlipidemia      Discussion:        He is afebrile. He is tolerating antibiotic okay. Blood cell count is 15,200 today. It is slowly improving. Blood cultures from admission remain negative    Serum creatinine 0.8 today. Hemoglobin is 9.9. Platelet count 859,268 today. Initial presentation and leukocytosis seem to be related to his ongoing sinus disease    Plan:     Diagnostic Workup:      · Continue to follow  fever curve, WBC count and blood cultures. · Continue to monitor blood counts, liver and renal function. Antimicrobials:    · As cefepime can cause encephalopathy nightly, will stop IV cefepime and order IV ceftriaxone 2 g every 24 hours  · Continue IV Flagyl 500 mg every 8 hours  · Will order oral probiotic twice daily  · We will follow up on the culture results and clinical progress and will make further recommendations accordingly. · Continue close vitals monitoring. · Maintain good glycemic control. · Fall precautions. Aspiration precautions. · Continue to watch for new fever or diarrhea. · DVT prophylaxis. · Discussed all above with patient and RN.   · Discussed with patient's wife at bedside      Drug Monitoring:    · Continue monitoring for antibiotic toxicity as follows: CBC, CMP   · Continue to watch for following: new or worsening fever, new hypotension, hives, lip swelling and redness or purulence at vascular access sites. I/v access Management:    · Continue to monitor i.v access sites for erythema, induration, discharge or tenderness. · As always, continue efforts to minimize tubes/lines/drains as clinically appropriate to reduce chances of line associated infections. Patient education and counseling:        · The patient was educated in detail about the side-effects of various antibiotics and things to watch for like new rashes, lip swelling, severe reaction, worsening diarrhea, break through fever etc.  · Discussed patient's condition and what to expect. All of the patient's questions were addressed in a satisfactory manner and patient verbalized understanding all instructions. Level of complexity of visit: High      TIME SPENT TODAY:     - Spent over  36 minutes on visit (including interval history, physical exam, review of data including labs, cultures, imaging, development and implementation of treatment plan and coordination of complex care). More than 50 percent of this includes face-to-face time spent with the patient for counseling and coordination of care. Thank you for involving me in the care of your patient. I will continue to follow. If you have anyadditional questions, please do not hesitate to contact me. Subjective: Interval history: Interval history was obtained from chart review and patient/ RN. He is afebrile. He is tolerating antibiotics okay. No diarrhea. Feels tired. REVIEW OF SYSTEMS:     Review of Systems   Constitutional: Positive for fatigue. Negative for chills, diaphoresis and fever. HENT: Negative for ear discharge, ear pain, rhinorrhea, sore throat and trouble swallowing. Eyes: Negative for discharge and redness. Respiratory: Negative for cough, shortness of breath and wheezing. Cardiovascular: Negative for chest pain and leg swelling. Gastrointestinal: Negative for abdominal pain, constipation, diarrhea and nausea. Endocrine: Negative for polyuria. Genitourinary: Negative for dysuria, flank pain, frequency, hematuria and urgency. Musculoskeletal: Positive for back pain. Negative for myalgias. Skin: Negative for rash. Neurological: Negative for dizziness, seizures and headaches. Hematological: Does not bruise/bleed easily. Psychiatric/Behavioral: Negative for hallucinations and suicidal ideas. All other systems reviewed and are negative. Past Medical History: All past medical history reviewed today. Past Medical History:   Diagnosis Date    [facility]) [facility]     CAD (coronary artery disease)     stents 2017    Cancer ([facility]     Hyperlipidemia     Hypertension     Nasal bleeding     worse since covid making the mask mandatory       Past Surgical History: All past surgical history was reviewed today. Past Surgical History:   Procedure Laterality Date    CARDIAC SURGERY  2017    stents placed     COLONOSCOPY      SINUS ENDOSCOPY N/A 11/2/2020    FUNCTIONAL ENDOSCOPIC SINUS SURGERY performed by Sagar Tellez MD at [address] [place] [address] 4/12/2021    RIGHT FUNCTIONAL ENDOSCOPIC SINUS SURGERY ([id number], [address] [place] [address], [facility]) performed by Sagar Tellez MD at [address] [place] [address] 9/8/2021    EGD GASTRIC BIOPSY performed by Joni López MD at [address] [place] [address]       Family History: All family history was reviewed today. Problem Relation Age of Onset    Other Brother         anurysim       Objective:       PHYSICAL EXAM:      Vitals:   Vitals:    09/10/21 2300 09/11/21 0645 09/11/21 0647 09/11/21 0825   BP: (!) 169/94 (!) 177/94 (!) 177/94 135/75   Pulse: 85 64  82   Resp: 16 16  20   Temp: 98.2 °F (36.8 °C)   98 °F (36.7 °C)   TempSrc: Oral   Oral   SpO2: 95% 94%  96%   Weight:       Height:           Physical Exam  Vitals and nursing note reviewed. Constitutional:       Appearance: Normal appearance. He is well-developed. HENT:      Head: Normocephalic and atraumatic. Right Ear: External ear normal.      Left Ear: External ear normal.      Nose: Nose normal. No congestion or rhinorrhea. Mouth/Throat:      Mouth: Mucous membranes are moist.      Pharynx: No oropharyngeal exudate or posterior oropharyngeal erythema. Eyes:      General: No scleral icterus. Right eye: No discharge. Left eye: No discharge. Conjunctiva/sclera: Conjunctivae normal.      Pupils: Pupils are equal, round, and reactive to light. Cardiovascular:      Rate and Rhythm: Normal rate and regular rhythm. Pulses: Normal pulses. Heart sounds: No murmur heard. No friction rub. Pulmonary:      Effort: Pulmonary effort is normal. No respiratory distress. Breath sounds: Normal breath sounds. No stridor. No wheezing, rhonchi or rales. Abdominal:      General: Bowel sounds are normal.      Palpations: Abdomen is soft. Tenderness: There is no abdominal tenderness. There is no right CVA tenderness, left CVA tenderness, guarding or rebound. Musculoskeletal:         General: No swelling or tenderness. Normal range of motion. Cervical back: Normal range of motion and neck supple. No rigidity. No muscular tenderness. Lymphadenopathy:      Cervical: No cervical adenopathy. Skin:     General: Skin is warm and dry. Coloration: Skin is not jaundiced. Findings: No erythema or rash. Neurological:      Mental Status: He is alert. Motor: No abnormal muscle tone. Comments: Awake, slightly encephalopathic   Psychiatric:         Mood and Affect: Mood normal.         Behavior: Behavior normal.         Thought Content: Thought content normal.           Lines and drains: All vascular access sites are healthy with no local erythema, discharge or tenderness. Intake and output:    I/O last 3 completed shifts: In: 5655 [P.O.:240; I.V.:750; IV Piggyback:200]  Out: 300 [Urine:300]    Lab Data:    All available labs and old records have been reviewed by me. CBC:  Recent Labs     09/09/21  0232 09/09/21  1029 09/10/21  0418 09/10/21  0418 09/10/21  1207 09/10/21  2131 09/11/21  0650   WBC 22.3*  --  19.3*  --   --   --  15.2*   RBC 4.31  --  4.02*  --   --   --  3.55*   HGB 12.0*   < > 11.2*   < > 11.0* 11.4* 9.9*   HCT 35.9*   < > 33.6*   < > 32.7* 34.3* 29.7*     --  346  --   --   --  308   MCV 83.2  --  83.6  --   --   --  83.7   MCH 27.9  --  27.9  --   --   --  27.9   MCHC 33.6  --  33.3  --   --   --  33.4   RDW 13.5  --  13.5  --   --   --  13.6    < > = values in this interval not displayed. BMP:  Recent Labs     09/09/21  0232 09/10/21  0418 09/11/21  0650   * 131* 134*   K 4.3 4.1 3.5   CL 99 100 103   CO2 19* 20* 22   BUN 19 15 16   CREATININE 0.8 0.7* 0.8   CALCIUM 9.1 8.8 8.4   GLUCOSE 156* 127* 133*        Hepatic Function Panel:   Lab Results   Component Value Date    ALKPHOS 128 09/07/2021    ALT 22 09/07/2021    AST 18 09/07/2021    PROT 8.0 09/07/2021    BILITOT 0.6 09/07/2021    LABALBU 4.4 09/07/2021       CPK: No results found for: CKTOTAL  ESR: No results found for: SEDRATE  CRP: No results found for: CRP        Imaging: All pertinent images and reports for the current visit were reviewed by me during this visit. XR ABDOMEN (2 VIEWS)   Final Result   Mild generalized ileus pattern. Low colonic obstruction is a less likely   differential possibility. Mild prominence of stool in the ascending colon. Possible distention of the urinary bladder. Clinical correlation is   suggested. If necessary, follow-up ultrasound may be obtained. CT SOFT TISSUE NECK WO CONTRAST   Final Result   Chronic or acute on chronic pansinusitis. The distribution is similar to the   CT PET of 05/17/2021. Superimposed acute components of inflammation may be   present, similar to the previous study.   Nodular soft tissue density in the   right nasal passage is slightly CONTRAST Additional Contrast? Oral    (Results Pending)       Medications: All current and past medications were reviewed.  tamsulosin  0.4 mg Oral Nightly    pantoprazole  40 mg IntraVENous BID    naloxegol  25 mg Oral QAM    lidocaine 1 % injection  5 mL IntraDERmal Once    sodium chloride flush  5-40 mL IntraVENous 2 times per day    atorvastatin  40 mg Oral Nightly    valsartan  80 mg Oral Daily    sodium chloride flush  5-40 mL IntraVENous 2 times per day    cefepime  2,000 mg IntraVENous Q8H    metroNIDAZOLE  500 mg IntraVENous Q8H    lidocaine  1 patch TransDERmal Daily        sodium chloride      sodium chloride      sodium chloride 150 mL/hr at 09/11/21 1122       sodium chloride flush, sodium chloride, oxyCODONE, oxyCODONE, promethazine, sodium chloride, melatonin, ondansetron, sodium chloride flush, sodium chloride, acetaminophen **OR** acetaminophen, morphine, hydrALAZINE, bisacodyl      Problem list:       Patient Active Problem List   Diagnosis Code    Subacute pansinusitis J01.40    Nasal polyposis J33.9    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, C31.9    Abdominal aortic aneurysm (AAA) without rupture ([address] I71.4    Uncontrolled pain R52    Pathologic thoracic fracture, initial encounter M84.48XA    Hypertensive urgency I16.0    Spine metastasis ([address] C79.51    Bandemia D72.825    Hyponatremia E87.1    Coronary artery disease due to lipid rich plaque I25.10, I25.83    Fatty liver disease, nonalcoholic F98.8    Essential hypertension I10    Hyperlipidemia E78.5       Please note that this chart was generated using Dragon dictation software. Although every effort was made to ensure the accuracy of this automated transcription, some errors in transcription may have occurred inadvertently. If you may need any clarification, please do not hesitate to contact me through EPIC or at the phone number provided below with my electronic signature.   Any pictures or media included in this note were obtained after taking informed verbal consent from the patient and with their approval to include those in the patient's medical record.     Mitra Britt MD, MPH  9/11/2021 , 1:16 PM   [facility] Infectious Disease   [address] [place] [address], [address] [place] [address]  Office: [phone]  Fax: [phone]  Clinic days:  Tuesday & Thursday

## 2021-09-11 NOTE — PROGRESS NOTES
Shift assessment see flow sheets meds per orders see eMAR. PRN pain meds after ambulation. SBA to bathroom, would recommend walker for distances. The care plan and education has been reviewed and mutually agreed upon with the patient. Patient remains free from falls. All fall precautions in place. Yellow blanket at bedside, yellow bracelet on patient. SAFE sign on door. Bed and chair alarms being used. Bed in lowest position. Will monitor.      Electronically signed by Enedina Nageotte, RN on 9/11/2021 at 12:53 AM

## 2021-09-11 NOTE — PROGRESS NOTES
Gastroenterology Progress Note    Coty Carr is a 67 y.o. male patient. 1. Pathologic thoracic fracture, initial encounter    2. Septicemia ([address]    3. Hypertensive urgency        Admission Date: 2021  Hospital Day: Hospital Day: 5  Attending: Shanice Timmons MD  Date of service: 9/10/21    SUBJECTIVE:  Small solid stool with enema. Still feels bloating, tolerating liquids but feels full. ROS:  Cardiovascular ROS: no chest pain  Gastrointestinal ROS: see above  Respiratory ROS: no cough, shortness of breath, or wheezing    Physical    VITALS:  /75   Pulse 82   Temp 98 °F (36.7 °C) (Oral)   Resp 20   Ht 5' 9\" (1.753 m)   Wt 184 lb 1.6 oz (83.5 kg)   SpO2 96%   BMI 27.19 kg/m²   TEMPERATURE:  Current - Temp: 98 °F (36.7 °C); Max - Temp  Av.1 °F (36.7 °C)  Min: 97.9 °F (36.6 °C)  Max: 98.2 °F (36.8 °C)    NAD  RRR, Nl s1s2  Lungs CTA Bilaterally, normal effort  Abdomen soft, diffusely tender, no rebound or guarding, mild distended. AAOx3, No asterixis     Data      CBC:   Recent Labs     21  0232 21  1029 09/10/21  0418 09/10/21  0418 09/10/21  1207 09/10/21  2131 21  0650   WBC 22.3*  --  19.3*  --   --   --  15.2*   RBC 4.31  --  4.02*  --   --   --  3.55*   HGB 12.0*   < > 11.2*   < > 11.0* 11.4* 9.9*   HCT 35.9*   < > 33.6*   < > 32.7* 34.3* 29.7*     --  346  --   --   --  308   MCV 83.2  --  83.6  --   --   --  83.7   MCH 27.9  --  27.9  --   --   --  27.9   MCHC 33.6  --  33.3  --   --   --  33.4   RDW 13.5  --  13.5  --   --   --  13.6    < > = values in this interval not displayed. BMP:  Recent Labs     21  0232 09/10/21  0418 21  0650   * 131* 134*   K 4.3 4.1 3.5   CL 99 100 103   CO2 19* 20* 22   BUN 19 15 16   CREATININE 0.8 0.7* 0.8   CALCIUM 9.1 8.8 8.4   GLUCOSE 156* 127* 133*        Hepatic Function Panel:   No results for input(s): AST, ALT, BILIDIR, BILITOT, ALKPHOS in the last 72 hours.     No results for MD Mcintyre Medici  09/11/21

## 2021-09-11 NOTE — PROGRESS NOTES
Hospitalist Progress Note      PCP: Marcus Tyler MD    Date of Admission: 9/7/2021    Chief Complaint: Back pain    Hospital Course: 67 y.o. 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 Patient states that the pain has been there for the past several weeks. Has been addressed with PMD who was concerned for cardiac etiology. Patient was seen at [address] [place] [address] on 8/31/2021 at which time he was evaluated with EKG troponins CT chest and nuclear stress test which did not reveal etiology. Patient was also supposed to get gallbladder ultrasound to rule out biliary etiology of this pain however opted to do it outpatient. Patient presents today to [facility] ER again with intractable back pain. Muscle relaxants prescribed at [facility] are not helping. Per patient spouse have all supply of pain medications from back when he had surgeries and has been managing with taking 1 pill a day. At the ED today CT chest shows metastatic disease and pathologic fracture of T4. Lucent lesions at T9 and L4 are also noted. Patient with marked leukocytosis of 21,000 with elevated lactate at 4.1 with resulting metabolic acidosis with anion gap with respiratory compensation. Subjective: Patient seen and examined at bedside.         Medications:  Reviewed    Infusion Medications    sodium chloride      sodium chloride      sodium chloride 150 mL/hr at 09/11/21 1122     Scheduled Medications    tamsulosin  0.4 mg Oral Nightly    pantoprazole  40 mg IntraVENous BID    cefTRIAXone (ROCEPHIN) IV  2,000 mg IntraVENous Q24H    naloxegol  25 mg Oral QAM    lidocaine 1 % injection  5 mL IntraDERmal Once    sodium chloride flush  5-40 mL IntraVENous 2 times per day    atorvastatin  40 mg Oral Nightly    valsartan  80 mg Oral Daily    sodium chloride flush  5-40 mL IntraVENous 2 times per day    metroNIDAZOLE  500 mg IntraVENous Q8H    lidocaine  1 patch TransDERmal Daily     PRN Meds: sodium chloride flush, sodium chloride, oxyCODONE, oxyCODONE, promethazine, sodium chloride, melatonin, ondansetron, sodium chloride flush, sodium chloride, acetaminophen **OR** acetaminophen, morphine, hydrALAZINE, bisacodyl      Intake/Output Summary (Last 24 hours) at 9/11/2021 1634  Last data filed at 9/11/2021 1202  Gross per 24 hour   Intake 1540 ml   Output 300 ml   Net 1240 ml       Physical Exam Performed:    /75   Pulse 82   Temp 98 °F (36.7 °C) (Oral)   Resp 20   Ht 5' 9\" (1.753 m)   Wt 184 lb 1.6 oz (83.5 kg)   SpO2 96%   BMI 27.19 kg/m²     General appearance: No apparent distress, appears stated age and cooperative. HEENT: Pupils equal, round, and reactive to light. Conjunctivae/corneas clear. Neck: Supple, with full range of motion. No jugular venous distention. Trachea midline. Respiratory:  Normal respiratory effort. Clear to auscultation, bilaterally without Rales/Wheezes/Rhonchi. Cardiovascular: Regular rate and rhythm with normal S1/S2 without murmurs, rubs or gallops. Abdomen: Soft, non-tender, non-distended with normal bowel sounds. Musculoskeletal: No clubbing, cyanosis or edema bilaterally. Full range of motion without deformity. Skin: Skin color, texture, turgor normal.  No rashes or lesions. Neurologic:  Neurovascularly intact without any focal sensory/motor deficits.  Cranial nerves: II-XII intact, grossly non-focal.  Psychiatric: Alert and oriented, thought content appropriate, normal insight  Capillary Refill: Brisk,< 3 seconds   Peripheral Pulses: +2 palpable, equal bilaterally       Labs:   Recent Labs     09/09/21  0232 09/09/21  1029 09/10/21  0418 09/10/21  1207 09/10/21  2131 09/11/21  0650 09/11/21  1257   WBC 22.3*  --  19.3*  --   --  15.2*  --    HGB 12.0*   < > 11.2*   < > 11.4* 9.9* 10.4*   HCT 35.9*   < > 33.6*   < > 34.3* 29.7* 31.4*     --  346  --   --  308  --     < > = values in this interval not displayed. Recent Labs     09/09/21  0232 09/10/21  0418 09/11/21  0650   * 131* 134*   K 4.3 4.1 3.5   CL 99 100 103   CO2 19* 20* 22   BUN 19 15 16   CREATININE 0.8 0.7* 0.8   CALCIUM 9.1 8.8 8.4   PHOS 2.3* 2.1* 2.5     No results for input(s): AST, ALT, BILIDIR, BILITOT, ALKPHOS in the last 72 hours. No results for input(s): INR in the last 72 hours. No results for input(s): Earlis Homer in the last 72 hours. Urinalysis:      Lab Results   Component Value Date    NITRU Negative 09/07/2021    WBCUA 1 09/07/2021    RBCUA 1 09/07/2021    BLOODU Negative 09/07/2021    SPECGRAV >1.030 09/07/2021    GLUCOSEU 100 09/07/2021       Radiology:  CT ABDOMEN PELVIS WO CONTRAST Additional Contrast? Oral   Final Result   Bilateral hydroureteronephrosis, greater on the left. There is a partially   obstructing 3 mm stone in the mid left ureter. There is also new left   perinephric and ureteric edema, suggesting component of obstruction due to   stone. Hinton catheter is in normal position. Urinary bladder is partially   decompressed. Etiology of dilation of the right collecting system and lower   left collecting system is uncertain. Correlation for optimized function of   Hinton catheter is suggested. Mild dilation of mid to lower small bowel loops in the pelvis. There is no   discrete area of transition. Generalized or focal ileus is favored over   obstruction. Small bilateral pleural effusions increased in the interval.  Bibasilar   atelectasis. Stable lytic lesion in the L4 vertebral body. XR ABDOMEN (2 VIEWS)   Final Result   Mild generalized ileus pattern. Low colonic obstruction is a less likely   differential possibility. Mild prominence of stool in the ascending colon. Possible distention of the urinary bladder.   Clinical correlation is   suggested. If necessary, follow-up ultrasound may be obtained. CT SOFT TISSUE NECK WO CONTRAST   Final Result   Chronic or acute on chronic pansinusitis. The distribution is similar to the   CT PET of 05/17/2021. Superimposed acute components of inflammation may be   present, similar to the previous study. Nodular soft tissue density in the   right nasal passage is slightly decreased. No adenopathy or acute inflammatory abnormality is otherwise identified. CT HEAD WO CONTRAST   Preliminary Result   1. No evidence of acute intracranial abnormality. 2. Paranasal sinus disease in this patient status post interval right-sided   paranasal sinus surgery when compared to 10/16/2020.   3. Interval development of bilateral mastoid sinus disease with new interval   opacification of middle ear cavities bilaterally. US GALLBLADDER RUQ   Final Result   Liver is heterogeneous in appearance and increased in echogenicity compatible   with diffuse hepatocellular disease, hepatic steatosis. Subtle findings of   the liver are limited due to body habitus. Please refer to CT performed same   day for further evaluation. The gallbladder appears unremarkable in appearance. CT THORACIC SPINE TRAUMA RECONSTRUCTION   Final Result   Findings consistent with metastatic disease and pathologic fracture at T4. The patient is likely a candidate for kyphoplasty and radiofrequency   ablation. Interventional radiology consult is suggested. Lucent lesions at T9 and L4 most likely represent Schmorl's node deformities,   however metastatic disease is difficult to exclude. CT LUMBAR SPINE WO CONTRAST   Final Result   Findings consistent with metastatic disease and pathologic fracture at T4. The patient is likely a candidate for kyphoplasty and radiofrequency   ablation. Interventional radiology consult is suggested.       Lucent lesions at T9 and L4 most likely represent Schmorl's node deformities,   however metastatic disease is difficult to exclude. CTA Chest W WO Contrast   Final Result   1. No acute intrathoracic abnormality. 2. No acute intra-abdominal abnormality. CT ABDOMEN PELVIS W IV CONTRAST Additional Contrast? None   Final Result   1. No acute intrathoracic abnormality. 2. No acute intra-abdominal abnormality. 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  GI consultedEGD     Severe sepsis with lactic acidosis  Most likely source upper respiratory infection  Broad-spectrum antibiotics including anaerobic coverage, however, WBC increasing  We will consult ID  Follow-up blood cultures  IV fluids     Anion gap acidosis  Likely secondary to elevated lactic acid  Resolved     Accelerated hypertensionimproved in view of pain  Continue home dose of valsartan  IV hydralazine as needed     History of coronary heart disease  Recent stress test showed no ischemia  Unable to take antiplatelets in view of epistaxis     Squamous cell carcinoma of the sinus  Metastatic  ENT and heme-onc consulted    DVT Prophylaxis: No pharmacologic anticoagulation since admission given patient's history of epistaxis and recommendation against anticoagulation by ENT, additionally with coffee-ground emesis  Diet: ADULT DIET;  Clear Liquid  Adult Oral Nutrition Supplement; Clear Liquid Oral Supplement  Code Status: DNR-CCA    Electronically signed by Lisset Tirplett MD on 9/11/2021 at 4:34 PM

## 2021-09-11 NOTE — PROGRESS NOTES
Am assessment complete, vss, alert and oriented, no BM yet, PRN Dulcolax given, all patient questions answered, will continue to monitor.  Henry Garcia RN

## 2021-09-11 NOTE — PLAN OF CARE
Problem: Pain:  Description: Pain management should include both nonpharmacologic and pharmacologic interventions.   Goal: Pain level will decrease  Description: Pain level will decrease  9/10/2021 2214 by Arnoldo March RN  Outcome: Ongoing  9/10/2021 1314 by Daniel Bhatia RN  Outcome: Ongoing  Goal: Control of acute pain  Description: Control of acute pain  9/10/2021 2214 by Arnoldo March RN  Outcome: Ongoing  9/10/2021 1314 by Daniel Bhatia RN  Outcome: Ongoing  Goal: Control of chronic pain  Description: Control of chronic pain  9/10/2021 2214 by Arnoldo March RN  Outcome: Ongoing  9/10/2021 1314 by Daniel Bhatia RN  Outcome: Ongoing     Problem: Falls - Risk of:  Goal: Will remain free from falls  Description: Will remain free from falls  9/10/2021 2214 by Arnoldo March RN  Outcome: Ongoing  9/10/2021 1314 by Daniel Bhatia RN  Outcome: Ongoing  Goal: Absence of physical injury  Description: Absence of physical injury  9/10/2021 2214 by Arnoldo March RN  Outcome: Ongoing  9/10/2021 1314 by Daniel Bhatia RN  Outcome: Ongoing     Problem: Nutrition  Goal: Optimal nutrition therapy  9/10/2021 2214 by Arnoldo March RN  Outcome: Ongoing  9/10/2021 1500 by Rylee Sauceda RD, LD  Outcome: Ongoing

## 2021-09-12 NOTE — PROGRESS NOTES
Gastroenterology Progress Note    Jose Alfredo Galan is a 67 y.o. male patient. 1. Pathologic thoracic fracture, initial encounter    2. Septicemia ([address]    3. Hypertensive urgency        Admission Date: 2021  Hospital Day: Hospital Day: 6  Attending: Afia Centeno MD  Date of service: 9/10/21    SUBJECTIVE:  Had large bm's yesterday after ct. ROS:  Cardiovascular ROS: no chest pain  Gastrointestinal ROS: see above  Respiratory ROS: no cough, shortness of breath, or wheezing    Physical    VITALS:  /67   Pulse 82   Temp 97.8 °F (36.6 °C) (Oral)   Resp 18   Ht 5' 9\" (1.753 m)   Wt 184 lb 1.6 oz (83.5 kg)   SpO2 97%   BMI 27.19 kg/m²   TEMPERATURE:  Current - Temp: 97.8 °F (36.6 °C); Max - Temp  Av °F (36.7 °C)  Min: 97.8 °F (36.6 °C)  Max: 98.2 °F (36.8 °C)    NAD  RRR, Nl s1s2  Lungs CTA Bilaterally, normal effort  Abdomen soft, now nondistended and soft/nontender. AAOx3, No asterixis     Data      CBC:   Recent Labs     09/10/21  0418 09/10/21  1207 21  0650 21  1257 21  0547   WBC 19.3*  --  15.2*  --  9.6   RBC 4.02*  --  3.55*  --  2.99*   HGB 11.2*   < > 9.9* 10.4* 8.4*   HCT 33.6*   < > 29.7* 31.4* 24.6*     --  308  --  275   MCV 83.6  --  83.7  --  82.4   MCH 27.9  --  27.9  --  28.1   MCHC 33.3  --  33.4  --  34.1   RDW 13.5  --  13.6  --  13.5    < > = values in this interval not displayed. BMP:  Recent Labs     09/10/21  0418 21  0650 21  0547   * 134* 136   K 4.1 3.5 3.6    103 105   CO2 20* 22 22   BUN 15 16 14   CREATININE 0.7* 0.8 0.7*   CALCIUM 8.8 8.4 8.0*   GLUCOSE 127* 133* 123*        Hepatic Function Panel:   No results for input(s): AST, ALT, BILIDIR, BILITOT, ALKPHOS in the last 72 hours. No results for input(s): LIPASE, AMYLASE in the last 72 hours. No results for input(s): PROTIME, INR in the last 72 hours. No results for input(s): PTT in the last 72 hours.   No results for input(s): OCCULTBLD in the last 72 hours. Radiology Review:    CT ABDOMEN PELVIS WO CONTRAST Additional Contrast? Oral   Final Result   Bilateral hydroureteronephrosis, greater on the left. There is a partially   obstructing 3 mm stone in the mid left ureter. There is also new left   perinephric and ureteric edema, suggesting component of obstruction due to   stone. Hinton catheter is in normal position. Urinary bladder is partially   decompressed. Etiology of dilation of the right collecting system and lower   left collecting system is uncertain. Correlation for optimized function of   Hinton catheter is suggested. Mild dilation of mid to lower small bowel loops in the pelvis. There is no   discrete area of transition. Generalized or focal ileus is favored over   obstruction. Small bilateral pleural effusions increased in the interval.  Bibasilar   atelectasis. Stable lytic lesion in the L4 vertebral body. XR ABDOMEN (2 VIEWS)   Final Result   Mild generalized ileus pattern. Low colonic obstruction is a less likely   differential possibility. Mild prominence of stool in the ascending colon. Possible distention of the urinary bladder. Clinical correlation is   suggested. If necessary, follow-up ultrasound may be obtained. CT SOFT TISSUE NECK WO CONTRAST   Final Result   Chronic or acute on chronic pansinusitis. The distribution is similar to the   CT PET of 05/17/2021. Superimposed acute components of inflammation may be   present, similar to the previous study. Nodular soft tissue density in the   right nasal passage is slightly decreased. No adenopathy or acute inflammatory abnormality is otherwise identified. CT HEAD WO CONTRAST   Preliminary Result   1. No evidence of acute intracranial abnormality. 2. Paranasal sinus disease in this patient status post interval right-sided   paranasal sinus surgery when compared to 10/16/2020.   3.  Interval development of bilateral mastoid sinus disease with new interval   opacification of middle ear cavities bilaterally. US GALLBLADDER RUQ   Final Result   Liver is heterogeneous in appearance and increased in echogenicity compatible   with diffuse hepatocellular disease, hepatic steatosis. Subtle findings of   the liver are limited due to body habitus. Please refer to CT performed same   day for further evaluation. The gallbladder appears unremarkable in appearance. CT THORACIC SPINE TRAUMA RECONSTRUCTION   Final Result   Findings consistent with metastatic disease and pathologic fracture at T4. The patient is likely a candidate for kyphoplasty and radiofrequency   ablation. Interventional radiology consult is suggested. Lucent lesions at T9 and L4 most likely represent Schmorl's node deformities,   however metastatic disease is difficult to exclude. CT LUMBAR SPINE WO CONTRAST   Final Result   Findings consistent with metastatic disease and pathologic fracture at T4. The patient is likely a candidate for kyphoplasty and radiofrequency   ablation. Interventional radiology consult is suggested. Lucent lesions at T9 and L4 most likely represent Schmorl's node deformities,   however metastatic disease is difficult to exclude. CTA Chest W WO Contrast   Final Result   1. No acute intrathoracic abnormality. 2. No acute intra-abdominal abnormality. CT ABDOMEN PELVIS W IV CONTRAST Additional Contrast? None   Final Result   1. No acute intrathoracic abnormality. 2. No acute intra-abdominal abnormality. ASSESSMENT   Coffee-ground emesis/anemia -patient acute onset of nausea, vomiting and coffee-ground emesis 9/8. CT abdomen pelvis without source.  No recent epistaxis.  EGD 9/8 - Mild esophagitis, small HH, coffee ground material and food in fundus, limiting endoscopic view, patchy gastritis (biopsied). Hbg 10-->8 today but no gi bleeding. rec repeat.    sepsis -Felt to be from sinusitis. On IV antibiotics. ID following  T4 pathologic fracture concerning for metastatic disease  H/o SCC of the sinuses : ent/onc/rad onc following  Constipation: 9/7 ct normal bowel but then kub ileus/constipation/ low colonic obstruction less likely. 9/11 ct ileus, then iwht movantik and mendel contribution from ct oral contrast had multipe large bm yesterday and abd sx resolved. : florentino placed. 9/11 ct with hydroureternephrosis and ureter stone.      PLAN    IV PPI increase to bid for reflux  advnace to soft diet  Continue movantik for possible component of narcotic bowel syndrome  miralax daily to avoid constipation; can stop if develops loose stool  Repeat cbc, hbg dropped ? 2 points but no sign gi bleed. Nurse to update urology about ct finding    Cristiana Pace MD  [facility]  09/12/21    Addend: repeat hbg same as 9/11 am value so this am hbg 8 likely lab error. With resolved ileus/constipation and stable hbg will sign off, call if needed.

## 2021-09-12 NOTE — PLAN OF CARE
Problem: Pain:  Description: Pain management should include both nonpharmacologic and pharmacologic interventions.   Goal: Pain level will decrease  Description: Pain level will decrease  Outcome: Ongoing  Goal: Control of acute pain  Description: Control of acute pain  Outcome: Ongoing  Goal: Control of chronic pain  Description: Control of chronic pain  Outcome: Ongoing     Problem: Nutrition  Goal: Optimal nutrition therapy  Outcome: Ongoing     Problem: Falls - Risk of:  Goal: Will remain free from falls  Description: Will remain free from falls  Outcome: Ongoing

## 2021-09-12 NOTE — PROGRESS NOTES
8:16 AM  Morning assessment completed, patient denies needs at this time, call light in reach and bed alarm on. Will continue to monitor. The care plan and education has been reviewed and mutually agreed upon with the patient. Educated patient on the potential for falls during their hospital stay. Reviewed current fall safety protocol. Reviewed indication for use of bed alarm. Patient verbalized understanding. 10:33 AM  Patient assisted up to the chair. Chair alarm on and call light in reach. Patient set up to shave and wash up. Bed linens changed. Patient reports a large BM yesterday evening. 11:19 AM   Message sent to urology regarding results of CT yesterday. 11:24 AM  Urology aware of CT results. No intervention needed at this time. 2:08 PM  Patient assisted back to the bed with bed alarm on and call light in reach. 6:37 PM  Cath kennedy used and blood return noted on both lumens. Both lumens flush easily.

## 2021-09-13 NOTE — PROGRESS NOTES
Shift assessment completed, pt is alert and oriented, VSS, fall precautions in place, call light within reach, denies any pain or other needs at this time, see flowsheets and MAR, will monitor pt. The care plan and education has been reviewed and mutually agreed upon with the patient.

## 2021-09-13 NOTE — PROGRESS NOTES
[facility] - ENT  PROGRESS  NOTE    Date of Service: 2021    ASSESSMENT: Cena Sacks is a very pleasant 67 y.o. 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 Presenting with upper back pain, noted to have pathological T4 fracture and lucent lesions at T9 and L4.       PLAN/RECOMMENDATIONS:     1. Cancer of nasal cavity and sinus ([address]  2. S/P radiation therapy  3. Thoracic Compression Fracture     Plan:  Concern for metastatic vertebral lesion given pathologic fracture. Agree with kyphoplasty and vertebral biopsy when possible to evaluate for possible metastatic spread of his known squamous cell carcinoma. MRI current ordered by IR to further eval, kyphoplasty after possible. If this is unable to be performed can consider biopsy of right posterior lateral nasal lesion noted on prior endoscopic examination to at least rule out local recurrence  Continue sinonasal saline irrigation  Oncology and radiation oncology following as well. Please do not hesitate to call ENT with any questions or concerns. SUBJECTIVE:   Continues to have back pain. No nosebleeds. Wife states that kyphoplasty may be performed tomorrow but not sure. Finally had bowel movement. OBJECTIVE:  Physical Exam:   Temp (24hrs), Av °F (36.7 °C), Min:97.7 °F (36.5 °C), Max:98.2 °F (36.8 °C)    Vitals:    21 2352 21 0415 21 0830 21 1237   BP: (!) 152/76 (!) 148/77 (!) 156/68 137/73   Pulse: 74 64 78 81   Resp: 16 16  16   Temp: 98.2 °F (36.8 °C) 98 °F (36.7 °C) 97.8 °F (36.6 °C) 97.7 °F (36.5 °C)   TempSrc: Oral Oral Oral Oral   SpO2: 96% 95% 94% 97%   Weight:       Height:         I/O last 3 completed shifts:   In: 227.9 [IV Piggyback:227.9]  Out: 4525 [Urine:4525]    REVIEW OF SYSTEMS  The following systems were reviewed and revealed the following in addition to any already discussed in the HPI:     CONSTITUTIONAL: no weight loss, no fever, no night sweats, no chills  EYES: no vision changes, no blurry vision  EARS: + bilateral hearing loss, no otalgia  NOSE: no epistaxis, no rhinorrhea  RESPIRATORY: no difficulty breathing, no shortness of breath  CV: no chest pain, no lower extremity swelling  HEME: no coagulation disorder, no known bleeding disorders  NEURO: no TIA or stroke-like symptoms  SKIN: no new rashes in the head and neck, no recently diagnosed skin cancers  MOUTH: no new oral ulcers, no recent tooth infections  GASTROINTESTINAL: no diarrhea, +stomach pain, + nausea, + constipation  PSYCH: no anxiety, no depression      GENERAL: No acute distress, alert and oriented, no hoarseness, on supplemental oxygen via nasal cannula, appears weak  EYES: EOMI, Anti-icteric  NOSE:  On anterior rhinoscopy nasal septum severely deviated to the left. Upon looking in the right nasal passage there is a dense amount of nasal crusting. No active epistaxis. EARS: Normal external appearance, some nose hearing loss noted on examination but able to answer questions effectively. No otorrhea. Hearing aids are not in place. FACE: HB 1/6 bilaterally, symmetric appearing, sensation equal bilaterally  ORAL CAVITY: No masses or lesions visualized or palpated, uvula is midline, moist mucous membranes. No active bleeding or blood clots in the oropharynx.   NECK: Normal range of motion, no thyromegaly, trachea is midline, no palpable lymphadenopathy or neck masses, no crepitus  CHEST: Normal respiratory effort, breathing comfortably, no retractions  SKIN: No rashes, normal appearing skin, no evidence of skin lesions/tumors  NEURO: Cranial Nerves 2, 3, 4, 5, 6, 7, 11, 12 grossly intact bilaterally     Lab Studies:  Lab Results   Component Value Date    WBC 9.6 09/13/2021    HGB 9.0 (L) 09/13/2021    HCT 26.0 (L) 09/13/2021    MCV 82.7 09/13/2021     09/13/2021     Lab Results   Component Value Date    GLUCOSE 119 (H) 09/13/2021    BUN 9 09/13/2021 CREATININE 0.7 (L) 09/13/2021    K 3.4 (L) 09/13/2021     09/13/2021     09/13/2021    CALCIUM 7.9 (L) 09/13/2021     Lab Results   Component Value Date    MG 1.90 09/13/2021     Lab Results   Component Value Date    PHOS 3.1 09/13/2021     Lab Results   Component Value Date    ALKPHOS 128 09/07/2021    ALT 22 09/07/2021    AST 18 09/07/2021    BILITOT 0.6 09/07/2021    PROT 8.0 09/07/2021

## 2021-09-13 NOTE — PROGRESS NOTES
[facility]   Progress Note      SUBJECTIVE:      Feels okay. Had bowel movements last night. Back pain is stable. No fever or chills. OBJECTIVE    Physical  VITALS:  BP (!) 156/68   Pulse 78   Temp 97.8 °F (36.6 °C) (Oral)   Resp (!) 78   Ht 5' 9\" (1.753 m)   Wt 184 lb 1.6 oz (83.5 kg)   SpO2 94%   BMI 27.19 kg/m²   TEMPERATURE:  Current - Temp: 97.8 °F (36.6 °C); Max - Temp  Av °F (36.7 °C)  Min: 97.8 °F (36.6 °C)  Max: 98.2 °F (36.8 °C)  BLOOD PRESSURE RANGE:  Systolic (50SKS), QCO:402 , Min:146 , UCF:882   ; Diastolic (73VXX), RKJ:41, Min:68, Max:78    24HR INTAKE/OUTPUT:      Intake/Output Summary (Last 24 hours) at 2021 0853  Last data filed at 2021 0615  Gross per 24 hour   Intake 1467.93 ml   Output 3550 ml   Net -2082.07 ml       Conscious alert and oriented. Mild facial erythema noted. Respiratory efforts are normal.  Abdomen Not distended  Extremities no edema.     Data  Labs:  General Labs:  CBC with Differential:    Lab Results   Component Value Date    WBC 9.6 2021    RBC 3.15 2021    HGB 9.0 2021    HCT 26.0 2021     2021    MCV 82.7 2021    MCH 28.7 2021    MCHC 34.7 2021    RDW 13.0 2021    LYMPHOPCT 8.2 2021    MONOPCT 9.7 2021    BASOPCT 0.7 2021    MONOSABS 0.9 2021    LYMPHSABS 0.8 2021    EOSABS 0.4 2021    BASOSABS 0.1 2021     BMP:    Lab Results   Component Value Date     2021    K 3.4 2021    K 4.2 2021     2021    CO2 24 2021    BUN 9 2021    LABALBU 4.4 2021    CREATININE 0.7 2021    CREATININE 0.9 2020    CALCIUM 7.9 2021    GFRAA >60 2021    LABGLOM >60 2021    GLUCOSE 119 2021     Hepatic Function Panel:    Lab Results   Component Value Date    ALKPHOS 128 2021    ALT 22 2021    AST 18 2021    PROT 8.0 2021    BILITOT 0.6 09/07/2021    LABALBU 4.4 09/07/2021     LDH:  No results found for: LDH  PT/INR:    Lab Results   Component Value Date    PROTIME 12.8 09/07/2021    INR 1.13 09/07/2021     PTT:    Lab Results   Component Value Date    APTT 31.6 09/07/2021   [APTT    Current Medications  Current Facility-Administered Medications: polyethylene glycol (GLYCOLAX) packet 17 g, 17 g, Oral, Daily  tamsulosin (FLOMAX) capsule 0.4 mg, 0.4 mg, Oral, Nightly  pantoprazole (PROTONIX) injection 40 mg, 40 mg, IntraVENous, BID  cefTRIAXone (ROCEPHIN) 2000 mg IVPB in D5W 50ml minibag, 2,000 mg, IntraVENous, Q24H  naloxegol (MOVANTIK) tablet 25 mg, 25 mg, Oral, QAM  lidocaine PF 1 % injection 5 mL, 5 mL, IntraDERmal, Once  sodium chloride flush 0.9 % injection 5-40 mL, 5-40 mL, IntraVENous, 2 times per day  sodium chloride flush 0.9 % injection 5-40 mL, 5-40 mL, IntraVENous, PRN  0.9 % sodium chloride infusion, 25 mL, IntraVENous, PRN  oxyCODONE (ROXICODONE) immediate release tablet 5 mg, 5 mg, Oral, Q4H PRN  oxyCODONE (ROXICODONE) immediate release tablet 10 mg, 10 mg, Oral, Q4H PRN  promethazine (PHENERGAN) injection 12.5 mg, 12.5 mg, IntraVENous, Q6H PRN  sodium chloride (OCEAN, BABY AYR) 0.65 % nasal spray 1 spray, 1 spray, Each Nostril, Q6H PRN  melatonin tablet 3 mg, 3 mg, Oral, Nightly PRN  ondansetron (ZOFRAN) injection 4 mg, 4 mg, IntraVENous, Q6H PRN  atorvastatin (LIPITOR) tablet 40 mg, 40 mg, Oral, Nightly  valsartan (DIOVAN) tablet 80 mg, 80 mg, Oral, Daily  sodium chloride flush 0.9 % injection 5-40 mL, 5-40 mL, IntraVENous, 2 times per day  sodium chloride flush 0.9 % injection 5-40 mL, 5-40 mL, IntraVENous, PRN  0.9 % sodium chloride infusion, 25 mL, IntraVENous, PRN  acetaminophen (TYLENOL) tablet 650 mg, 650 mg, Oral, Q6H PRN **OR** acetaminophen (TYLENOL) suppository 650 mg, 650 mg, Rectal, Q6H PRN  0.9 % sodium chloride infusion, , IntraVENous, Continuous  morphine (PF) injection 2 mg, 2 mg, IntraVENous, Q4H PRN  metronidazole (FLAGYL) 500 mg in NaCl 100 mL IVPB premix, 500 mg, IntraVENous, Q8H  hydrALAZINE (APRESOLINE) injection 10 mg, 10 mg, IntraVENous, Q6H PRN  lidocaine 4 % external patch 1 patch, 1 patch, TransDERmal, Daily  bisacodyl (DULCOLAX) EC tablet 5 mg, 5 mg, Oral, Daily PRN    ASSESSMENT AND PLAN    79-year-old gentleman with history of AAA, CAD, hypertension has    1. 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  -Dr. Len Vazquez is the treating radiation oncologist.  -ENT is following. 2.  Thoracic compression fracture:    -There is some suspicion for metastatic disease  -Plan is for kyphoplasty after infection settles. At that time biopsy from the spine will be obtained. 3.  Leukocytosis, sepsis:    -Receiving broad-spectrum antibiotics  -ID is following    4. Coffee-ground emesis:    -EGD 9/8/2021 showed mild erosive esophagitis hiatal hernia and patchy gastritis. -Hemoglobin is 9 today  - Check iron and B12    5.   Constipation    - Better after stool softeners and laxatives      Baltazar Nolasco MD

## 2021-09-13 NOTE — PROGRESS NOTES
Pt resting in bed. Snoozing and snoring most of afternoon. Rouses easily to voice. States he slept poorly last night. IR PA in to round. Waiting for MRI.

## 2021-09-13 NOTE — PROGRESS NOTES
Physical Therapy  Facility/Department: [address] ORTHO/NEURO NURSING  Daily Treatment Note  NAME: Jack Lewis  : 1945  MRN: [id number]    Date of Service: 2021  Shon Liu scored a 19/24 on the AM-PAC short mobility form. Current research shows that an AM-PAC score of 18 or greater is typically associated with a discharge to the patient's home setting. Based on the patient's AM-PAC score and their current functional mobility deficits, it is recommended that the patient have 2-3 sessions per week of Physical Therapy at d/c to increase the patient's independence. At this time, this patient demonstrates the endurance and safety to discharge home with Home PT and a follow up treatment frequency of 2-3x/wk. Please see assessment section for further patient specific details. If patient discharges prior to next session this note will serve as a discharge summary. Please see below for the latest assessment towards goals. HOME HEALTH CARE: LEVEL 1 STANDARD    - Initial home health evaluation to occur within 24-48 hours, in patient home   - Therapy to evaluate with goal of regaining prior level of functioning   - Therapy to evaluate if patient has [address] needs for personal care    Discharge Recommendations:      PT Equipment Recommendations  Equipment Needed: No  Other: Has a FWW and cane at home    Assessment   Assessment: Pt. able to tolerate therapy this date, increasing the distance of ambulation without signifcant c/o back pain. Does appear to be safest with the use of a FWW at this time. Continues to require skilled PT to improve functional mobility.   Treatment Diagnosis: Decreased endurance, functional mobility, ROM, strength, and increased pain limiting full participation in ADLs and prolonged standing and ambulation tasks  Prognosis: Good  PT Education: Goals;PT Role;Plan of Care;Home Exercise Program;Functional Mobility Training;Gait Training  Patient Education: Pt verbalized understanding  Barriers to Learning: hearing  REQUIRES PT FOLLOW UP: Yes  Activity Tolerance  Activity Tolerance: Patient Tolerated treatment well  Activity Tolerance: Pt. exhibiting PEARL but SpO2 97% following 200' of ambulation. Patient Diagnosis(es): The primary encounter diagnosis was Pathologic thoracic fracture, initial encounter. Diagnoses of Septicemia ([facility] and Hypertensive urgency were also pertinent to this visit. has a past medical history of AAA (abdominal aortic aneurysm) ([facility] CAD (coronary artery disease), Cancer ([facility] Hyperlipidemia, Hypertension, and Nasal bleeding. has a past surgical history that includes Cardiac surgery (2017); Colonoscopy; Sinus endoscopy (N/A, 11/2/2020); Sinus endoscopy (Right, 4/12/2021); and Upper gastrointestinal endoscopy (N/A, 9/8/2021). Restrictions  Restrictions/Precautions  Restrictions/Precautions: Fall Risk, Modified Diet (High fall risk, Dysphagia soft and bite sized)  Required Braces or Orthoses?: No  Position Activity Restriction  Other position/activity restrictions: 67 y.o. 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 Patient states that the pain has been there for the past several weeks. Has been addressed with PMD who was concerned for cardiac etiology. Patient was seen at [address] [place] [address] on 8/31/2021 at which time he was evaluated with EKG troponins CT chest and nuclear stress test which did not reveal etiology. Patient was also supposed to get gallbladder ultrasound to rule out biliary etiology of this pain however opted to do it outpatient. Patient presents today to [facility] ER again with intractable back pain. Muscle relaxants prescribed at [facility] are not helping.   Per patient spouse have all supply of pain medications from back when he had surgeries and has been managing with taking 1 pill a day. At the ED today CT chest shows metastatic disease and pathologic fracture of T4. Lucent lesions at T9 and L4 are also noted. Patient with marked leukocytosis of 21,000 with elevated lactate at 4.1 with resulting metabolic acidosis with anion gap with respiratory compensation. Hospitalist consulted for admission.   Subjective   General  Chart Reviewed: Yes  Family / Caregiver Present: No  Subjective  Subjective: Supine in bed, agreeable to therapy, c/o diarrhea this am, no significant c/o pain reports \"gets me\" intermittently          Orientation  Orientation  Overall Orientation Status: Within Functional Limits  Cognition      Objective   Bed mobility  Rolling to Right: Stand by assistance (with use of the rail)  Supine to Sit: Stand by assistance (with use of the rail)  Sit to Supine: Unable to assess (Left up in luz)  Scooting: Stand by assistance  Transfers  Sit to Stand: Stand by assistance  Stand to sit: Stand by assistance  Ambulation  Ambulation?: Yes  More Ambulation?: Yes  Ambulation 1  Surface: level tile  Device: Rolling Walker  Assistance: Contact guard assistance;Stand by assistance  Quality of Gait: forward head and trunk posture, narrow AMBROSIO at times even adducted across midline or at midline, with distraction has a balance check  Gait Deviations: Decreased step height;Decreased step length  Distance: ~ 200' in mayer  Ambulation 2  Surface - 2: level tile  Device 2: No device  Assistance 2: Contact guard assistance;Minimal assistance  Quality of Gait 2: unsteady, balancing with the wall and reaching for support  Distance: 15' to bathroom  Comments: Unsafe this date without an AD     Balance  Posture: Fair (Forward head and forward flexed)  Sitting - Static: Good  Sitting - Dynamic: Good  Standing - Static: Good;-  Standing - Dynamic: Fair;+  Exercises  Hip Flexion: x 10 B LE  Knee Long Arc Quad: x 10 B LE  Ankle Pumps: x 10 B LE  Comments: Pt. reports completing LE exercises intermittently while sitting in the chair  Other exercises  Other exercises?: No         Comment: Toilet transfer with SBA, [employer] with Supervision, Pulled brief up/down with supervision. Set up for comfort in the BS chair. AM-PAC Score  AM-PAC Inpatient Mobility Raw Score : 19 (09/13/21 0923)  AM-PAC Inpatient T-Scale Score : 45.44 (09/13/21 2881)  Mobility Inpatient CMS 0-100% Score: 41.77 (09/13/21 0556)  Mobility Inpatient CMS G-Code Modifier : CK (09/13/21 7077)          Goals  Short term goals  Time Frame for Short term goals: d/c  Short term goal 1: Pt will be able to perform bed mobility mod I  Short term goal 2: Pt will be able to perform STS mod I  Short term goal 3: Pt will be able to ambulate 150ft w/supervision  Short term goal 4: Pt will be able to ascend/descend 4 steps w/SBA  Short term goal 5: Pt will be able to perform a car transfer w/SBA    Plan    Safety Devices  Type of devices:  All fall risk precautions in place, Left in chair, Call light within reach, Gait belt, Nurse notified  Restraints  Initially in place: No     Therapy Time   Individual Concurrent Group Co-treatment   Time In 0840         Time Out 0910         Minutes 30         Timed Code Treatment Minutes: [place], [address] [place] [address], [id number]

## 2021-09-13 NOTE — CARE COORDINATION
Discharge planning note:    Patient is on Room Air. IV Rocephin & IV Flagyl currently. Waiting for it to be determined if Kyphoplasty can be done. AM-PAC Scores:  PT- 19,  OT - 20     Will likely need HHC at discharge, depending on level of pain.   Wife has list.    Geraldine Martin RN BSN  Case Management  [phone]

## 2021-09-13 NOTE — CONSULTS
[facility]  INTERVENTIONAL-RADIOLOGY    INPATIENT CONSULT       Reason for consult: Pathologic Fracture of T4  Consulting Provider: Darcie Sanchez MD    Name: Deana Gu  :  1945  MRN: [id number]  Date: 21      CC:   Chief Complaint   Patient presents with    Back Pain     Patient in by squad from home with complaints of back pain. States he had radiation treatment 3 week. Admitted 1 week ago for same pain at [place] [address]. HPI: Deana Gu is an 67 y.o. male admitted on 2021. He was found to have a pathologic fracture on CT scan. We have been asked to evaluate for possible kyphoplasty with ablation and biopsy of the area. He reports he completed radiation to his maxillary sinus in 2021. States that about 2 weeks ago he began having pain in his mid thoracic back. States the pain is unrelenting. States that he is not able to move much without causing pain. Pain is worse with movement and better with standing still. He denies pain radiating to his extremities. Denies any known injury to the area. Reports he has had problems with severe sinus infections ever since he completed radiation therapy to his sinuses in 2021. Denies CP, SOB, N/V, Abdominal pain, Fever/chills.        ASSESSMENT/PLAN  Active Problems:    Pathologic thoracic fracture, initial encounter  -Appreciate consult  -Imaging reviewed with Dr Brittany Mendez   -Likely able to perform kyphoplasty with osteocool of T4  -Will need thoracic MRI for better visualization of cord  -Will need clearance from ID as still on antibiotics  -MRI ordered    Septicemia  Bandemia  -ID following  -on Rocephin, Flagyl  -will need clearance from ID  -will need neg blood Cx x2 for kyphoplasty    Maxillary sinus CA  -appears to have recurred on PET  -?source of sepsis  -no with bone mets    Chronic conditions managed by primary  -Coronary artery disease due to lipid rich plaque  -Fatty liver disease, nonalcoholic  -Essential hypertension  -Hyperlipidemia    >45 minutes was spent including chart review, imaging review, face to face evaluation and explanation with patient and family, physical exam, and documentation      Past Medical History:   Diagnosis Date    AAA [facility] [address]     CAD (coronary artery disease)     stents 2017    Cancer ([facility] [address]     Hyperlipidemia     Hypertension     Nasal bleeding     worse since covid making the mask mandatory      Past Surgical History:   Procedure Laterality Date    CARDIAC SURGERY  2017    stents placed     COLONOSCOPY      SINUS ENDOSCOPY N/A 11/2/2020    FUNCTIONAL ENDOSCOPIC SINUS SURGERY performed by Prudencio Marroquin MD at [address] [place] [address] 4/12/2021    RIGHT FUNCTIONAL ENDOSCOPIC [facility] ([id number], [id number]) performed by Prudencio Marroquin MD at [address] N/A 9/8/2021    EGD GASTRIC BIOPSY performed by Marck Lacey MD at [address] History     Socioeconomic History    Marital status:      Spouse name: None    Number of children: None    Years of education: None    Highest education level: None   Occupational History    None   Tobacco Use    Smoking status: Never Smoker    Smokeless tobacco: Never Used   Vaping Use    Vaping Use: Never used   Substance and Sexual Activity    Alcohol use: Never    Drug use: Never    Sexual activity: None   Other Topics Concern    None   Social History Narrative    None     Social Determinants of Health     Financial Resource Strain: Low Risk     Difficulty of Paying Living Expenses: Not hard at all   Food Insecurity: No Food Insecurity    Worried About Running Out of Food in the Last Year: Never true    [place] of Food in the Last Year: Never true   Transportation Needs:     Lack of Transportation (Medical):      Lack of Transportation (Non-Medical):    Physical Activity:     Days of Exercise per Week:     Minutes of Exercise per Session:    Stress:     Feeling of Stress :    Social Connections:     Frequency of Communication with Friends and Family:     Frequency of Social Gatherings with Friends and Family:     Attends [employer] Services:     Active Member of Clubs or Organizations:     Attends Club or Organization Meetings:     Marital Status:    Intimate Partner Violence:     Fear of Current or Ex-Partner:     Emotionally Abused:     Physically Abused:     Sexually Abused:           Allergies   Allergen Reactions    Fish Oil Anaphylaxis    Shellfish Allergy Anaphylaxis    Lisinopril      Other reaction(s): Cough      Current Facility-Administered Medications: polyethylene glycol (GLYCOLAX) packet 17 g, 17 g, Oral, Daily  tamsulosin (FLOMAX) capsule 0.4 mg, 0.4 mg, Oral, Nightly  pantoprazole (PROTONIX) injection 40 mg, 40 mg, IntraVENous, BID  cefTRIAXone (ROCEPHIN) 2000 mg IVPB in D5W 50ml minibag, 2,000 mg, IntraVENous, Q24H  naloxegol (MOVANTIK) tablet 25 mg, 25 mg, Oral, QAM  lidocaine PF 1 % injection 5 mL, 5 mL, IntraDERmal, Once  sodium chloride flush 0.9 % injection 5-40 mL, 5-40 mL, IntraVENous, 2 times per day  sodium chloride flush 0.9 % injection 5-40 mL, 5-40 mL, IntraVENous, PRN  0.9 % sodium chloride infusion, 25 mL, IntraVENous, PRN  oxyCODONE (ROXICODONE) immediate release tablet 5 mg, 5 mg, Oral, Q4H PRN  oxyCODONE (ROXICODONE) immediate release tablet 10 mg, 10 mg, Oral, Q4H PRN  promethazine (PHENERGAN) injection 12.5 mg, 12.5 mg, IntraVENous, Q6H PRN  sodium chloride (OCEAN, BABY AYR) 0.65 % nasal spray 1 spray, 1 spray, Each Nostril, Q6H PRN  melatonin tablet 3 mg, 3 mg, Oral, Nightly PRN  ondansetron (ZOFRAN) injection 4 mg, 4 mg, IntraVENous, Q6H PRN  atorvastatin (LIPITOR) tablet 40 mg, 40 mg, Oral, Nightly  valsartan (DIOVAN) tablet 80 mg, 80 mg, Oral, Daily  sodium chloride flush 0.9 % injection 5-40 mL, 5-40 mL, IntraVENous, 2 times per day  sodium chloride flush 0.9 % injection 5-40 mL, 5-40 mL, IntraVENous, PRN  0.9 % sodium chloride infusion, 25 mL, IntraVENous, PRN  acetaminophen (TYLENOL) tablet 650 mg, 650 mg, Oral, Q6H PRN **OR** acetaminophen (TYLENOL) suppository 650 mg, 650 mg, Rectal, Q6H PRN  0.9 % sodium chloride infusion, , IntraVENous, Continuous  morphine (PF) injection 2 mg, 2 mg, IntraVENous, Q4H PRN  metronidazole (FLAGYL) 500 mg in NaCl 100 mL IVPB premix, 500 mg, IntraVENous, Q8H  hydrALAZINE (APRESOLINE) injection 10 mg, 10 mg, IntraVENous, Q6H PRN  lidocaine 4 % external patch 1 patch, 1 patch, TransDERmal, Daily  bisacodyl (DULCOLAX) EC tablet 5 mg, 5 mg, Oral, Daily PRN     Review of Systems   Constitutional: Negative for diaphoresis, fatigue and fever. HENT: Positive for congestion, hearing loss, nosebleeds, postnasal drip, sinus pressure, sinus pain and sneezing. Negative for ear pain, facial swelling, rhinorrhea and sore throat. Eyes: Negative for pain, redness and visual disturbance. Respiratory: Negative for cough, chest tightness and shortness of breath. Cardiovascular: Negative for chest pain, palpitations and leg swelling. Gastrointestinal: Negative for abdominal pain, constipation, diarrhea and nausea. Musculoskeletal: Positive for back pain. Negative for arthralgias and myalgias. Skin: Negative for pallor and rash. Neurological: Negative for dizziness, numbness and headaches. Psychiatric/Behavioral: Negative for agitation, behavioral problems and confusion. /73   Pulse 81   Temp 97.7 °F (36.5 °C) (Oral)   Resp 16   Ht 5' 9\" (1.753 m)   Wt 184 lb 1.6 oz (83.5 kg)   SpO2 97%   BMI 27.19 kg/m²     Physical Exam  Vitals and nursing note reviewed. Constitutional:       Appearance: He is well-developed. He is not diaphoretic. HENT:      Head: Normocephalic and atraumatic. Nose: Congestion and rhinorrhea present. Eyes:      General:         Right eye: No discharge. Left eye: No discharge.       Pupils: Pupils are equal, round, and reactive to light. Cardiovascular:      Rate and Rhythm: Normal rate and regular rhythm. Heart sounds: Normal heart sounds. No murmur heard. No friction rub. No gallop. Pulmonary:      Effort: Pulmonary effort is normal. No respiratory distress. Breath sounds: Normal breath sounds. No wheezing or rales. Musculoskeletal:         General: Normal range of motion. Cervical back: Normal range of motion and neck supple. Thoracic back: Tenderness and bony tenderness (T4) present. Skin:     General: Skin is warm and dry. Neurological:      General: No focal deficit present. Mental Status: He is alert and oriented to person, place, and time. Mental status is at baseline. Psychiatric:         Behavior: Behavior normal.       RECENT IMAGING    CT ABDOMEN PELVIS WO CONTRAST Additional Contrast? Oral   Final Result   Bilateral hydroureteronephrosis, greater on the left. There is a partially   obstructing 3 mm stone in the mid left ureter. There is also new left   perinephric and ureteric edema, suggesting component of obstruction due to   stone. Hinton catheter is in normal position. Urinary bladder is partially   decompressed. Etiology of dilation of the right collecting system and lower   left collecting system is uncertain. Correlation for optimized function of   Hinton catheter is suggested. Mild dilation of mid to lower small bowel loops in the pelvis. There is no   discrete area of transition. Generalized or focal ileus is favored over   obstruction. Small bilateral pleural effusions increased in the interval.  Bibasilar   atelectasis. Stable lytic lesion in the L4 vertebral body. XR ABDOMEN (2 VIEWS)   Final Result   Mild generalized ileus pattern. Low colonic obstruction is a less likely   differential possibility. Mild prominence of stool in the ascending colon. Possible distention of the urinary bladder.   Clinical correlation node deformities,   however metastatic disease is difficult to exclude. CTA Chest W WO Contrast   Final Result   1. No acute intrathoracic abnormality. 2. No acute intra-abdominal abnormality. CT ABDOMEN PELVIS W IV CONTRAST Additional Contrast? None   Final Result   1. No acute intrathoracic abnormality. 2. No acute intra-abdominal abnormality.          MRI THORACIC SPINE W WO CONTRAST    (Results Pending)         NEETU SANTACRUZ PA-C  09/13/21 1:32 PM

## 2021-09-13 NOTE — PROGRESS NOTES
Infectious Diseases   Progress Note      Admission Date: 9/7/2021  Hospital Day: Hospital Day: 7   Attending: Paxton Duran MD  Date of service: 9/13/2021     Chief complaint/ Reason for consult:     · Sepsis with tachycardia, tachypnea, hypotension worsening leukocytosis  · Spinal metastatic disease  · Vertebral fracture at T4 level  · Fatty liver disease  · Hyponatremia    Microbiology:        I have reviewed allavailable micro lab data and cultures    · Blood culture (2/2) - collected on 9/7/2021: In process  · Urine culture  - collected on 9/7/2021: In process      Antibiotics and immunizations:       Current antibiotics: All antibiotics and their doses were reviewed by me    Recent Abx Admin                   metronidazole (FLAGYL) 500 mg in NaCl 100 mL IVPB premix (mg) 500 mg New Bag 09/13/21 0612     500 mg New Bag 09/12/21 2224      Restarted  1511     500 mg New Bag  1401    cefTRIAXone (ROCEPHIN) 2000 mg IVPB in D5W 50ml minibag (mg) 2,000 mg New Bag 09/12/21 1805                  Immunization History: All immunization history was reviewed by me today. Immunization History   Administered Date(s) Administered    Tdap (Boostrix, Adacel) 09/16/2019       Known drug allergies: All allergies were reviewed and updated    Allergies   Allergen Reactions    Fish Oil Anaphylaxis    Shellfish Allergy Anaphylaxis    Lisinopril      Other reaction(s): Cough       Social history:     Social History:  All social andepidemiologic history was reviewed and updated by me today as needed. · Tobacco use:   reports that he has never smoked. He has never used smokeless tobacco.  · Alcohol use:   reports no history of alcohol use. · Currently lives in: [address] [place] [address]  ·  reports no history of drug use.      COVID VACCINATION AND LAB RESULT RECORDS:     Internal Administration   First Dose      Second Dose           Last COVID Lab SARS-CoV-2 (no units)   Date Value   04/08/2021 Not Detected Assessment:     The patient is a 67 y.o. old male who  has a past medical history of AAA (abdominal aortic aneurysm) ([address] CAD (coronary artery disease), Cancer ([address] Hyperlipidemia, Hypertension, and Nasal bleeding. with following problems:    · Sepsis with tachycardia, tachypnea, hypotension, leukocytosis-resolved  · Spinal metastatic disease--ongoing  · Vertebral fracture at T4 level-hoping for IR procedure next week  · Fatty liver disease  · Hyponatremia -has been corrected  · Coronary artery disease  · Essential hypertension  · Abdominal aortic aneurysm  · Hyperlipidemia      Discussion:      The patient is afebrile. He is on IV ceftriaxone and IV Flagyl. White cell count is 9600 today. Serum creatinine 0.7. CT scan of abdomen and pelvis without contrast was done on 9/11/2021. CT images reviewed. Showed bilateral hydroureteronephrosis with partially obstructing stone in the left ureter as well as focal ileus. Plan:     Diagnostic Workup:      · Continue to follow  fever curve, WBC count and blood cultures. · Continue to monitor blood counts, liver and renal function. Antimicrobials:    · Will continue IV ceftriaxone 2 g every 24 hour  · Continue IV Flagyl 500 mg every 8 hours  · Urology following. And urology note reviewed  · Okay to proceed with vertebral kyphoplasty procedure from ID standpoint  · Okay to go ahead with any nasal endoscopic procedure or biopsy from ID standpoint, if ENT plans. · We will plan to switch him to oral antibiotic at discharge  · Continue close vitals monitoring. · Maintain good glycemic control. · Fall precautions. Aspiration precautions. · Continue to watch for new fever or diarrhea. · DVT prophylaxis. · Discussed all above with patient and RN.   · Discussed with patient's wife at bedside      Drug Monitoring:    · Continue monitoring for antibiotic toxicity as follows: CBC, CMP   · Continue to watch for following: new or worsening fever, new hypotension, hives, lip swelling and redness or purulence at vascular access sites. I/v access Management:    · Continue to monitor i.v access sites for erythema, induration, discharge or tenderness. · As always, continue efforts to minimize tubes/lines/drains as clinically appropriate to reduce chances of line associated infections. Patient education and counseling:        · The patient was educated in detail about the side-effects of various antibiotics and things to watch for like new rashes, lip swelling, severe reaction, worsening diarrhea, break through fever etc.  · Discussed patient's condition and what to expect. All of the patient's questions were addressed in a satisfactory manner and patient verbalized understanding all instructions. Level of complexity of visit: High     Thank you for involving me in the care of your patient. I will continue to follow. If you have anyadditional questions, please do not hesitate to contact me. Subjective: Interval history: Interval history was obtained from chart review and patient/ RN. He is afebrile. He is tolerating antibiotics okay. No diarrhea     REVIEW OF SYSTEMS:     Review of Systems   Constitutional: Positive for fatigue. Negative for chills, diaphoresis and fever. HENT: Negative for ear discharge, ear pain, rhinorrhea, sore throat and trouble swallowing. Eyes: Negative for discharge and redness. Respiratory: Negative for cough, shortness of breath and wheezing. Cardiovascular: Negative for chest pain and leg swelling. Gastrointestinal: Negative for abdominal pain, constipation, diarrhea and nausea. Endocrine: Negative for polyuria. Genitourinary: Negative for dysuria, flank pain, frequency, hematuria and urgency. Musculoskeletal: Negative for back pain and myalgias. Skin: Negative for rash. Neurological: Negative for dizziness, seizures and headaches. Hematological: Does not bruise/bleed easily. Psychiatric/Behavioral: Negative for hallucinations and suicidal ideas. All other systems reviewed and are negative. Past Medical History: All past medical history reviewed today. Past Medical History:   Diagnosis Date    AAA (abdominal aortic aneurysm) ([address]     CAD (coronary artery disease)     stents 2017    Cancer ([address]     Hyperlipidemia     Hypertension     Nasal bleeding     worse since covid making the mask mandatory       Past Surgical History: All past surgical history was reviewed today. Past Surgical History:   Procedure Laterality Date    CARDIAC SURGERY  2017    stents placed     COLONOSCOPY      SINUS ENDOSCOPY N/A 11/2/2020    FUNCTIONAL ENDOSCOPIC SINUS SURGERY performed by Janice Díaz MD at [address] [place] [address] 4/12/2021    RIGHT FUNCTIONAL ENDOSCOPIC SINUS SURGERY ([id number], [address] [place] [address], [id number]) performed by Janice Díaz MD at [facility] 9/8/2021    EGD GASTRIC BIOPSY performed by Stephane Perez MD at [address] [place] [address]       Family History: All family history was reviewed today. Problem Relation Age of Onset    Other Brother         anurysim       Objective:       PHYSICAL EXAM:      Vitals:   Vitals:    09/12/21 1959 09/12/21 2352 09/13/21 0415 09/13/21 0830   BP: (!) 146/78 (!) 152/76 (!) 148/77 (!) 156/68   Pulse: 70 74 64 78   Resp: 16 16 16 (!) 78   Temp: 98.1 °F (36.7 °C) 98.2 °F (36.8 °C) 98 °F (36.7 °C) 97.8 °F (36.6 °C)   TempSrc: Oral Oral Oral Oral   SpO2: 97% 96% 95% 94%   Weight:       Height:           Physical Exam  Vitals and nursing note reviewed. Constitutional:       Appearance: Normal appearance. He is well-developed. HENT:      Head: Normocephalic and atraumatic. Right Ear: External ear normal.      Left Ear: External ear normal.      Nose: Nose normal. No congestion or rhinorrhea.       Mouth/Throat:      Mouth: Mucous membranes are moist.      Pharynx: No oropharyngeal exudate or posterior oropharyngeal erythema. Eyes:      General: No scleral icterus. Right eye: No discharge. Left eye: No discharge. Conjunctiva/sclera: Conjunctivae normal.      Pupils: Pupils are equal, round, and reactive to light. Cardiovascular:      Rate and Rhythm: Normal rate and regular rhythm. Pulses: Normal pulses. Heart sounds: No murmur heard. No friction rub. Pulmonary:      Effort: Pulmonary effort is normal. No respiratory distress. Breath sounds: Normal breath sounds. No stridor. No wheezing, rhonchi or rales. Abdominal:      General: Bowel sounds are normal.      Palpations: Abdomen is soft. Tenderness: There is no abdominal tenderness. There is no right CVA tenderness, left CVA tenderness, guarding or rebound. Musculoskeletal:         General: No swelling or tenderness. Normal range of motion. Cervical back: Normal range of motion and neck supple. No rigidity. No muscular tenderness. Lymphadenopathy:      Cervical: No cervical adenopathy. Skin:     General: Skin is warm and dry. Coloration: Skin is not jaundiced. Findings: No erythema or rash. Neurological:      General: No focal deficit present. Mental Status: He is alert and oriented to person, place, and time. Mental status is at baseline. Motor: No abnormal muscle tone. Psychiatric:         Mood and Affect: Mood normal.         Behavior: Behavior normal.         Thought Content: Thought content normal.                 Lines and drains: All vascular access sites are healthy with no local erythema, discharge or tenderness. Intake and output:    I/O last 3 completed shifts: In: 1467.9 [P.O.:840; IV Piggyback:627.9]  Out: 3672 [Urine:3550]    Lab Data:   All available labs and old records have been reviewed by me.     CBC:  Recent Labs     09/11/21  0650 09/11/21  1257 09/12/21  0547 09/12/21  1211 09/13/21  0445   WBC 15.2*  --  9.6  --  9.6   RBC 3.55*  --  2.99* --  3.15*   HGB 9.9*   < > 8.4* 9.8* 9.0*   HCT 29.7*   < > 24.6* 29.3* 26.0*     --  275  --  303   MCV 83.7  --  82.4  --  82.7   MCH 27.9  --  28.1  --  28.7   MCHC 33.4  --  34.1  --  34.7   RDW 13.6  --  13.5  --  13.0    < > = values in this interval not displayed. BMP:  Recent Labs     09/11/21  0650 09/12/21  0547 09/13/21  0445   * 136 138   K 3.5 3.6 3.4*    105 105   CO2 22 22 24   BUN 16 14 9   CREATININE 0.8 0.7* 0.7*   CALCIUM 8.4 8.0* 7.9*   GLUCOSE 133* 123* 119*        Hepatic Function Panel:   Lab Results   Component Value Date    ALKPHOS 128 09/07/2021    ALT 22 09/07/2021    AST 18 09/07/2021    PROT 8.0 09/07/2021    BILITOT 0.6 09/07/2021    LABALBU 4.4 09/07/2021       CPK: No results found for: CKTOTAL  ESR: No results found for: SEDRATE  CRP: No results found for: CRP        Imaging: All pertinent images and reports for the current visit were reviewed by me during this visit. CT ABDOMEN PELVIS WO CONTRAST Additional Contrast? Oral   Final Result   Bilateral hydroureteronephrosis, greater on the left. There is a partially   obstructing 3 mm stone in the mid left ureter. There is also new left   perinephric and ureteric edema, suggesting component of obstruction due to   stone. Hinton catheter is in normal position. Urinary bladder is partially   decompressed. Etiology of dilation of the right collecting system and lower   left collecting system is uncertain. Correlation for optimized function of   Hinton catheter is suggested. Mild dilation of mid to lower small bowel loops in the pelvis. There is no   discrete area of transition. Generalized or focal ileus is favored over   obstruction. Small bilateral pleural effusions increased in the interval.  Bibasilar   atelectasis. Stable lytic lesion in the L4 vertebral body. XR ABDOMEN (2 VIEWS)   Final Result   Mild generalized ileus pattern.   Low colonic obstruction is a less likely differential possibility. Mild prominence of stool in the ascending colon. Possible distention of the urinary bladder. Clinical correlation is   suggested. If necessary, follow-up ultrasound may be obtained. CT SOFT TISSUE NECK WO CONTRAST   Final Result   Chronic or acute on chronic pansinusitis. The distribution is similar to the   CT PET of 05/17/2021. Superimposed acute components of inflammation may be   present, similar to the previous study. Nodular soft tissue density in the   right nasal passage is slightly decreased. No adenopathy or acute inflammatory abnormality is otherwise identified. CT HEAD WO CONTRAST   Preliminary Result   1. No evidence of acute intracranial abnormality. 2. Paranasal sinus disease in this patient status post interval right-sided   paranasal sinus surgery when compared to 10/16/2020.   3. Interval development of bilateral mastoid sinus disease with new interval   opacification of middle ear cavities bilaterally. US GALLBLADDER RUQ   Final Result   Liver is heterogeneous in appearance and increased in echogenicity compatible   with diffuse hepatocellular disease, hepatic steatosis. Subtle findings of   the liver are limited due to body habitus. Please refer to CT performed same   day for further evaluation. The gallbladder appears unremarkable in appearance. CT THORACIC SPINE TRAUMA RECONSTRUCTION   Final Result   Findings consistent with metastatic disease and pathologic fracture at T4. The patient is likely a candidate for kyphoplasty and radiofrequency   ablation. Interventional radiology consult is suggested. Lucent lesions at T9 and L4 most likely represent Schmorl's node deformities,   however metastatic disease is difficult to exclude. CT LUMBAR SPINE WO CONTRAST   Final Result   Findings consistent with metastatic disease and pathologic fracture at T4.    The patient is likely a candidate for kyphoplasty and radiofrequency   ablation. Interventional radiology consult is suggested. Lucent lesions at T9 and L4 most likely represent Schmorl's node deformities,   however metastatic disease is difficult to exclude. CTA Chest W WO Contrast   Final Result   1. No acute intrathoracic abnormality. 2. No acute intra-abdominal abnormality. CT ABDOMEN PELVIS W IV CONTRAST Additional Contrast? None   Final Result   1. No acute intrathoracic abnormality. 2. No acute intra-abdominal abnormality. Medications: All current and past medications were reviewed.      polyethylene glycol  17 g Oral Daily    tamsulosin  0.4 mg Oral Nightly    pantoprazole  40 mg IntraVENous BID    cefTRIAXone (ROCEPHIN) IV  2,000 mg IntraVENous Q24H    naloxegol  25 mg Oral QAM    lidocaine 1 % injection  5 mL IntraDERmal Once    sodium chloride flush  5-40 mL IntraVENous 2 times per day    atorvastatin  40 mg Oral Nightly    valsartan  80 mg Oral Daily    sodium chloride flush  5-40 mL IntraVENous 2 times per day    metroNIDAZOLE  500 mg IntraVENous Q8H    lidocaine  1 patch TransDERmal Daily        sodium chloride      sodium chloride      sodium chloride 150 mL/hr at 09/13/21 0610       sodium chloride flush, sodium chloride, oxyCODONE, oxyCODONE, promethazine, sodium chloride, melatonin, ondansetron, sodium chloride flush, sodium chloride, acetaminophen **OR** acetaminophen, morphine, hydrALAZINE, bisacodyl      Problem list:       Patient Active Problem List   Diagnosis Code    Subacute pansinusitis J01.40    Nasal polyposis J33.9    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 Although every effort was made to ensure the accuracy of this automated transcription, some errors in transcription may have occurred inadvertently. If you may need any clarification, please do not hesitate to contact me through EPIC or at the phone number provided below with my electronic signature. Any pictures or media included in this note were obtained after taking informed verbal consent from the patient and with their approval to include those in the patient's medical record.     Montse Wong MD, MPH  9/13/2021 , 12:36 PM   [facility] Infectious Disease   [address] [place] [address], [address] [place] [address]  Office: [phone]  Fax: [phone]  Clinic days:  Tuesday & Thursday

## 2021-09-13 NOTE — PROGRESS NOTES
Hospitalist Progress Note      PCP: Nohemy Dorantes MD    Date of Admission: 9/7/2021    Chief Complaint: Back pain    Hospital Course: 67 y.o. 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 Patient states that the pain has been there for the past several weeks. Has been addressed with PMD who was concerned for cardiac etiology. Patient was seen at [address] [place] [address] on 8/31/2021 at which time he was evaluated with EKG troponins CT chest and nuclear stress test which did not reveal etiology. Patient was also supposed to get gallbladder ultrasound to rule out biliary etiology of this pain however opted to do it outpatient. Patient presents today to [facility] ER again with intractable back pain. Muscle relaxants prescribed at [facility] are not helping. Per patient spouse have all supply of pain medications from back when he had surgeries and has been managing with taking 1 pill a day. At the ED today CT chest shows metastatic disease and pathologic fracture of T4. Lucent lesions at T9 and L4 are also noted. Patient with marked leukocytosis of 21,000 with elevated lactate at 4.1 with resulting metabolic acidosis with anion gap with respiratory compensation. Subjective: Patient seen and examined at bedside. He is feeling better today.        Medications:  Reviewed    Infusion Medications    sodium chloride      sodium chloride      sodium chloride 150 mL/hr at 09/13/21 0610     Scheduled Medications    polyethylene glycol  17 g Oral Daily    tamsulosin  0.4 mg Oral Nightly    pantoprazole  40 mg IntraVENous BID    cefTRIAXone (ROCEPHIN) IV  2,000 mg IntraVENous Q24H    naloxegol  25 mg Oral QAM    lidocaine 1 % injection  5 mL IntraDERmal Once    sodium chloride flush  5-40 mL IntraVENous 2 times per day    atorvastatin  40 mg Oral Nightly    valsartan  80 mg Oral Daily    sodium chloride flush  5-40 mL IntraVENous 2 times per day    metroNIDAZOLE  500 mg IntraVENous Q8H    lidocaine  1 patch TransDERmal Daily     PRN Meds: sodium chloride flush, sodium chloride, oxyCODONE, oxyCODONE, promethazine, sodium chloride, melatonin, ondansetron, sodium chloride flush, sodium chloride, acetaminophen **OR** acetaminophen, morphine, hydrALAZINE, bisacodyl      Intake/Output Summary (Last 24 hours) at 9/13/2021 1349  Last data filed at 9/13/2021 1241  Gross per 24 hour   Intake 1107.93 ml   Output 4525 ml   Net -3417.07 ml       Physical Exam Performed:    /73   Pulse 81   Temp 97.7 °F (36.5 °C) (Oral)   Resp 16   Ht 5' 9\" (1.753 m)   Wt 184 lb 1.6 oz (83.5 kg)   SpO2 97%   BMI 27.19 kg/m²     General appearance: No apparent distress, appears stated age and cooperative. HEENT: Pupils equal, round, and reactive to light. Conjunctivae/corneas clear. Neck: Supple, with full range of motion. No jugular venous distention. Trachea midline. Respiratory:  Normal respiratory effort. Clear to auscultation, bilaterally without Rales/Wheezes/Rhonchi. Cardiovascular: Regular rate and rhythm with normal S1/S2 without murmurs, rubs or gallops. Abdomen: Soft, non-tender, non-distended with normal bowel sounds. Musculoskeletal: No clubbing, cyanosis or edema bilaterally. Full range of motion without deformity. Skin: Skin color, texture, turgor normal.  No rashes or lesions. Neurologic:  Neurovascularly intact without any focal sensory/motor deficits.  Cranial nerves: II-XII intact, grossly non-focal.  Psychiatric: Alert and oriented, thought content appropriate, normal insight  Capillary Refill: Brisk,< 3 seconds   Peripheral Pulses: +2 palpable, equal bilaterally       Labs:   Recent Labs     09/11/21  0650 09/11/21  1257 09/12/21  0547 09/12/21  1211 09/13/21  0445   WBC 15.2*  --  9.6  --  9.6   HGB 9.9*   < > 8.4* 9.8* 9.0*   HCT 29.7*   < > 24.6* 29.3* 26.0*     --  275  --  303    < > = values in this interval not displayed. Recent Labs     09/11/21  0650 09/12/21  0547 09/13/21  0445   * 136 138   K 3.5 3.6 3.4*    105 105   CO2 22 22 24   BUN 16 14 9   CREATININE 0.8 0.7* 0.7*   CALCIUM 8.4 8.0* 7.9*   PHOS 2.5 3.3 3.1     No results for input(s): AST, ALT, BILIDIR, BILITOT, ALKPHOS in the last 72 hours. No results for input(s): INR in the last 72 hours. No results for input(s): Zettie Binet in the last 72 hours. Urinalysis:      Lab Results   Component Value Date    NITRU Negative 09/07/2021    WBCUA 1 09/07/2021    RBCUA 1 09/07/2021    BLOODU Negative 09/07/2021    SPECGRAV >1.030 09/07/2021    GLUCOSEU 100 09/07/2021       Radiology:  CT ABDOMEN PELVIS WO CONTRAST Additional Contrast? Oral   Final Result   Bilateral hydroureteronephrosis, greater on the left. There is a partially   obstructing 3 mm stone in the mid left ureter. There is also new left   perinephric and ureteric edema, suggesting component of obstruction due to   stone. Hinton catheter is in normal position. Urinary bladder is partially   decompressed. Etiology of dilation of the right collecting system and lower   left collecting system is uncertain. Correlation for optimized function of   Hinton catheter is suggested. Mild dilation of mid to lower small bowel loops in the pelvis. There is no   discrete area of transition. Generalized or focal ileus is favored over   obstruction. Small bilateral pleural effusions increased in the interval.  Bibasilar   atelectasis. Stable lytic lesion in the L4 vertebral body. XR ABDOMEN (2 VIEWS)   Final Result   Mild generalized ileus pattern. Low colonic obstruction is a less likely   differential possibility. Mild prominence of stool in the ascending colon. Possible distention of the urinary bladder.   Clinical correlation is   suggested. If necessary, follow-up ultrasound may be obtained. CT SOFT TISSUE NECK WO CONTRAST   Final Result   Chronic or acute on chronic pansinusitis. The distribution is similar to the   CT PET of 05/17/2021. Superimposed acute components of inflammation may be   present, similar to the previous study. Nodular soft tissue density in the   right nasal passage is slightly decreased. No adenopathy or acute inflammatory abnormality is otherwise identified. CT HEAD WO CONTRAST   Preliminary Result   1. No evidence of acute intracranial abnormality. 2. Paranasal sinus disease in this patient status post interval right-sided   paranasal sinus surgery when compared to 10/16/2020.   3. Interval development of bilateral mastoid sinus disease with new interval   opacification of middle ear cavities bilaterally. US GALLBLADDER RUQ   Final Result   Liver is heterogeneous in appearance and increased in echogenicity compatible   with diffuse hepatocellular disease, hepatic steatosis. Subtle findings of   the liver are limited due to body habitus. Please refer to CT performed same   day for further evaluation. The gallbladder appears unremarkable in appearance. CT THORACIC SPINE TRAUMA RECONSTRUCTION   Final Result   Findings consistent with metastatic disease and pathologic fracture at T4. The patient is likely a candidate for kyphoplasty and radiofrequency   ablation. Interventional radiology consult is suggested. Lucent lesions at T9 and L4 most likely represent Schmorl's node deformities,   however metastatic disease is difficult to exclude. CT LUMBAR SPINE WO CONTRAST   Final Result   Findings consistent with metastatic disease and pathologic fracture at T4. The patient is likely a candidate for kyphoplasty and radiofrequency   ablation. Interventional radiology consult is suggested.       Lucent lesions at T9 and L4 most likely represent Schmorl's node deformities,   however metastatic disease is difficult to exclude. CTA Chest W WO Contrast   Final Result   1. No acute intrathoracic abnormality. 2. No acute intra-abdominal abnormality. CT ABDOMEN PELVIS W IV CONTRAST Additional Contrast? None   Final Result   1. No acute intrathoracic abnormality. 2. No acute intra-abdominal abnormality.          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  GI consultedEGD     Severe sepsis with lactic acidosisimproved  Most likely source upper respiratory infection  Broad-spectrum antibiotics including anaerobic coverage, however, WBC normalized today  ID input appreciated  Follow-up blood cultures  IV fluids     Anion gap acidosis  Likely secondary to elevated lactic acid  Resolved     Accelerated hypertensionimproved in view of pain  Continue home dose of valsartan  IV hydralazine as needed     History of coronary heart disease  Recent stress test showed no ischemia  Unable to take antiplatelets in view of epistaxis     Squamous cell carcinoma of the sinus  Metastatic  ENT and heme-onc consulted    Bilateral hydronephrosis with small renal calculus believed to be obstructing  We will update urology    DVT Prophylaxis: No pharmacologic anticoagulation since admission given patient's history of epistaxis and recommendation against anticoagulation by ENT, additionally with coffee-ground emesis  Diet: ADULT DIET;  Dysphagia - Soft and Bite Sized  Adult Oral Nutrition Supplement; Standard High Calorie/High Protein Oral Supplement  Code Status: DNR-CCA    Electronically signed by rAtem Arnold MD on 2021 at 1:49 PM

## 2021-09-13 NOTE — PROGRESS NOTES
Hospitalist Progress Note      PCP: Kelly Berg MD    Date of Admission: 9/7/2021    Chief Complaint: Back pain    Hospital Course: 67 y.o. 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 Patient states that the pain has been there for the past several weeks. Has been addressed with PMD who was concerned for cardiac etiology. Patient was seen at [address] [place] [address] on 8/31/2021 at which time he was evaluated with EKG troponins CT chest and nuclear stress test which did not reveal etiology. Patient was also supposed to get gallbladder ultrasound to rule out biliary etiology of this pain however opted to do it outpatient. Patient presents today to [facility] ER again with intractable back pain. Muscle relaxants prescribed at [facility] are not helping. Per patient spouse have all supply of pain medications from back when he had surgeries and has been managing with taking 1 pill a day. At the ED today CT chest shows metastatic disease and pathologic fracture of T4. Lucent lesions at T9 and L4 are also noted. Patient with marked leukocytosis of 21,000 with elevated lactate at 4.1 with resulting metabolic acidosis with anion gap with respiratory compensation. Subjective: Patient seen and examined at bedside. He is feeling better today. Had a large bowel movement yesterday with great relief.       Medications:  Reviewed    Infusion Medications    sodium chloride      sodium chloride      sodium chloride 150 mL/hr at 09/12/21 1403     Scheduled Medications    polyethylene glycol  17 g Oral Daily    tamsulosin  0.4 mg Oral Nightly    pantoprazole  40 mg IntraVENous BID    cefTRIAXone (ROCEPHIN) IV  2,000 mg IntraVENous Q24H    naloxegol  25 mg Oral QAM    lidocaine 1 % injection  5 mL IntraDERmal Once    sodium chloride flush  5-40 mL IntraVENous 2 times per day    atorvastatin  40 mg Oral Nightly    valsartan  80 mg Oral Daily    sodium chloride flush  5-40 mL IntraVENous 2 times per day    metroNIDAZOLE  500 mg IntraVENous Q8H    lidocaine  1 patch TransDERmal Daily     PRN Meds: sodium chloride flush, sodium chloride, oxyCODONE, oxyCODONE, promethazine, sodium chloride, melatonin, ondansetron, sodium chloride flush, sodium chloride, acetaminophen **OR** acetaminophen, morphine, hydrALAZINE, bisacodyl      Intake/Output Summary (Last 24 hours) at 9/12/2021 2040  Last data filed at 9/12/2021 2008  Gross per 24 hour   Intake 1467.93 ml   Output 2050 ml   Net -582.07 ml       Physical Exam Performed:    BP (!) 146/78   Pulse 70   Temp 98.1 °F (36.7 °C) (Oral)   Resp 16   Ht 5' 9\" (1.753 m)   Wt 184 lb 1.6 oz (83.5 kg)   SpO2 97%   BMI 27.19 kg/m²     General appearance: No apparent distress, appears stated age and cooperative. HEENT: Pupils equal, round, and reactive to light. Conjunctivae/corneas clear. Neck: Supple, with full range of motion. No jugular venous distention. Trachea midline. Respiratory:  Normal respiratory effort. Clear to auscultation, bilaterally without Rales/Wheezes/Rhonchi. Cardiovascular: Regular rate and rhythm with normal S1/S2 without murmurs, rubs or gallops. Abdomen: Soft, non-tender, non-distended with normal bowel sounds. Musculoskeletal: No clubbing, cyanosis or edema bilaterally. Full range of motion without deformity. Skin: Skin color, texture, turgor normal.  No rashes or lesions. Neurologic:  Neurovascularly intact without any focal sensory/motor deficits.  Cranial nerves: II-XII intact, grossly non-focal.  Psychiatric: Alert and oriented, thought content appropriate, normal insight  Capillary Refill: Brisk,< 3 seconds   Peripheral Pulses: +2 palpable, equal bilaterally       Labs:   Recent Labs     09/10/21  0418 09/10/21  1207 09/11/21  0650 09/11/21  0650 09/11/21  1257 09/12/21  0547 09/12/21  1211   WBC 19.3*  --  15.2*  --   --  9.6  --    HGB 11.2*   < > 9.9*   < > 10.4* 8.4* 9.8*   HCT 33.6*   < > 29.7*   < > 31.4* 24.6* 29.3*     --  308  --   --  275  --     < > = values in this interval not displayed. Recent Labs     09/10/21  0418 09/11/21  0650 09/12/21  0547   * 134* 136   K 4.1 3.5 3.6    103 105   CO2 20* 22 22   BUN 15 16 14   CREATININE 0.7* 0.8 0.7*   CALCIUM 8.8 8.4 8.0*   PHOS 2.1* 2.5 3.3     No results for input(s): AST, ALT, BILIDIR, BILITOT, ALKPHOS in the last 72 hours. No results for input(s): INR in the last 72 hours. No results for input(s): Juan F Clap in the last 72 hours. Urinalysis:      Lab Results   Component Value Date    NITRU Negative 09/07/2021    WBCUA 1 09/07/2021    RBCUA 1 09/07/2021    BLOODU Negative 09/07/2021    SPECGRAV >1.030 09/07/2021    GLUCOSEU 100 09/07/2021       Radiology:  CT ABDOMEN PELVIS WO CONTRAST Additional Contrast? Oral   Final Result   Bilateral hydroureteronephrosis, greater on the left. There is a partially   obstructing 3 mm stone in the mid left ureter. There is also new left   perinephric and ureteric edema, suggesting component of obstruction due to   stone. Hinton catheter is in normal position. Urinary bladder is partially   decompressed. Etiology of dilation of the right collecting system and lower   left collecting system is uncertain. Correlation for optimized function of   Hinton catheter is suggested. Mild dilation of mid to lower small bowel loops in the pelvis. There is no   discrete area of transition. Generalized or focal ileus is favored over   obstruction. Small bilateral pleural effusions increased in the interval.  Bibasilar   atelectasis. Stable lytic lesion in the L4 vertebral body. XR ABDOMEN (2 VIEWS)   Final Result   Mild generalized ileus pattern.   Low colonic obstruction is a less likely   differential possibility. Mild prominence of stool in the ascending colon. Possible distention of the urinary bladder. Clinical correlation is   suggested. If necessary, follow-up ultrasound may be obtained. CT SOFT TISSUE NECK WO CONTRAST   Final Result   Chronic or acute on chronic pansinusitis. The distribution is similar to the   CT PET of 05/17/2021. Superimposed acute components of inflammation may be   present, similar to the previous study. Nodular soft tissue density in the   right nasal passage is slightly decreased. No adenopathy or acute inflammatory abnormality is otherwise identified. CT HEAD WO CONTRAST   Preliminary Result   1. No evidence of acute intracranial abnormality. 2. Paranasal sinus disease in this patient status post interval right-sided   paranasal sinus surgery when compared to 10/16/2020.   3. Interval development of bilateral mastoid sinus disease with new interval   opacification of middle ear cavities bilaterally. US GALLBLADDER RUQ   Final Result   Liver is heterogeneous in appearance and increased in echogenicity compatible   with diffuse hepatocellular disease, hepatic steatosis. Subtle findings of   the liver are limited due to body habitus. Please refer to CT performed same   day for further evaluation. The gallbladder appears unremarkable in appearance. CT THORACIC SPINE TRAUMA RECONSTRUCTION   Final Result   Findings consistent with metastatic disease and pathologic fracture at T4. The patient is likely a candidate for kyphoplasty and radiofrequency   ablation. Interventional radiology consult is suggested. Lucent lesions at T9 and L4 most likely represent Schmorl's node deformities,   however metastatic disease is difficult to exclude. CT LUMBAR SPINE WO CONTRAST   Final Result   Findings consistent with metastatic disease and pathologic fracture at T4.    The patient is likely a candidate

## 2021-09-14 NOTE — FLOWSHEET NOTE
09/13/21 5745   Encounter Summary   Services provided to: Patient and family together   Referral/Consult From: Nurse   Support System Family members   Continue Visiting   (Spir support / prayer 9/13 TJR)   Complexity of Encounter Moderate   Length of Encounter 45 minutes   Spiritual Assessment Completed Yes   Spiritual/Scientology   Type Spiritual support   Assessment Calm; Approachable   Intervention Active listening;Explored feelings, thoughts, concerns;Explored coping resources;Prayer;Discussed relationship with God;Discussed meaning/purpose;Discussed illness/injury and it's impact   Outcome Comfort;Expressed gratitude;Engaged in conversation; Shared life review;Expressed feelings/needs/concerns;Coping;Receptive       Spiritual Support offered.  visited patient to provide introduction and information on Spiritual Care. Patient was encouraged to have nurse contact [facility] if there are any needs or ways we can support their spiritual journey.       Electronically signed by Renu Archibald on 9/13/2021 at 10:55 PM

## 2021-09-14 NOTE — PLAN OF CARE
Nutrition Problem #1: Inadequate oral intake  Intervention: Food and/or Nutrient Delivery: Continue Oral Nutrition Supplement, Continue Current Diet  Nutritional Goals: PO intake 50% or greater

## 2021-09-14 NOTE — PROGRESS NOTES
bleeding disorders  NEURO: no TIA or stroke-like symptoms  SKIN: no new rashes in the head and neck, no recently diagnosed skin cancers  MOUTH: no new oral ulcers, no recent tooth infections  GASTROINTESTINAL: no diarrhea, +stomach pain, + nausea, + constipation  PSYCH: no anxiety, no depression      GENERAL: No acute distress, alert and oriented, no hoarseness, on supplemental oxygen via nasal cannula, appears weak  EYES: EOMI, Anti-icteric  NOSE:  On anterior rhinoscopy nasal septum severely deviated to the left. Upon looking in the right nasal passage there is a dense amount of nasal crusting. No active epistaxis. EARS: Normal external appearance, some nose hearing loss noted on examination but able to answer questions effectively. No otorrhea. Hearing aids are not in place. FACE: HB 1/6 bilaterally, symmetric appearing, sensation equal bilaterally  ORAL CAVITY: No masses or lesions visualized or palpated, uvula is midline, moist mucous membranes. No active bleeding or blood clots in the oropharynx.   NECK: Normal range of motion, no thyromegaly, trachea is midline, no palpable lymphadenopathy or neck masses, no crepitus  CHEST: Normal respiratory effort, breathing comfortably, no retractions  SKIN: No rashes, normal appearing skin, no evidence of skin lesions/tumors  NEURO: Cranial Nerves 2, 3, 4, 5, 6, 7, 11, 12 grossly intact bilaterally     Lab Studies:  Lab Results   Component Value Date    WBC 10.6 09/14/2021    HGB 9.2 (L) 09/14/2021    HCT 26.5 (L) 09/14/2021    MCV 82.2 09/14/2021     09/14/2021     Lab Results   Component Value Date    GLUCOSE 116 (H) 09/14/2021    BUN 8 09/14/2021    CREATININE 0.7 (L) 09/14/2021    K 3.2 (L) 09/14/2021     09/14/2021     09/14/2021    CALCIUM 8.0 (L) 09/14/2021     Lab Results   Component Value Date    MG 1.90 09/14/2021     Lab Results   Component Value Date    PHOS 2.7 09/14/2021     Lab Results   Component Value Date    ALKPHOS 128 09/07/2021    ALT 22 09/07/2021    AST 18 09/07/2021    BILITOT 0.6 09/07/2021    PROT 8.0 09/07/2021

## 2021-09-14 NOTE — PROGRESS NOTES
Urology Progress Note  [facility]    Provider: PRANAV Daily CNP  Patient ID:  Admission Date: 2021 Name: Nestor Gardiner Date: 2021 MRN: [id number]   [id number] : [id number]  Attending: Karissa Blanco MD Date of Service: 2021  PCP: Rex Lopez MD     Diagnoses:  Acute Urinary Retention    Assessment/Plan:  66 yo male admitted with HTN and intractable back pain 2/2 to T4 pathologic fracture with metastatic disease. No hx of dysuria, or BPH with LUTS, not taking prostate medications at home, has never seen a urologist in the past. Admitted 2021 but began to have increasing suprapubic pain 2021. PVR was obtained showing bladder distension and he was catheterized for over 1000cc's. CT a/p 2021 shows Left >Right hydronephrosis and a partially obstructing mid left ureteral stone measuring 3mm. No leukocytosis, Ua appears sterile, afebrile. Denies left abdominal or flank pain. No left CVAT. Recc  -Continue florentino  -Continue Flomax  -OOB as tolerated  -IR planning kyphoplasty today, Likely VT tomorrow  -He has a high chance of passing this stone, could potentially treat stone while inpatient, continue NPO status    The patient had a chance to ask questions which were answered. he understands the above plan. Subjective: Alfonso Mckeon is a 67 y.o. male. He was seen and examined this morning. Today we discussed retention and removing florentino tomorrow or the next. Discussed having to replace florentino if still having trouble. Discussed f/u in the office and likely need for cystoscopy to evaluate for obstruction.       Objective:   Vitals:  Vitals:    21 0925   BP: (!) 155/76   Pulse: 67   Resp: 16   Temp: 97.5 °F (36.4 °C)   SpO2: 95%       Intake/Output Summary (Last 24 hours) at 2021 1005  Last data filed at 2021 0525  Gross per 24 hour   Intake 9746.08 ml   Output 4920 ml   Net 4826.08 ml     Physical Exam:  Gen: Alert and oriented x3, no acute distress  CV: Regular rate   Resp: unlabored respirations  Abd: Soft, non-distended, non-tender, no masses  Ext: no peripheral edema noted, moves upper and lower extremities spontaneously  Skin: warmand well perfused, no rashes noted on the face, or arms.      Labs:  Lab Results   Component Value Date    WBC 10.6 09/14/2021    HGB 9.2 (L) 09/14/2021    HCT 26.5 (L) 09/14/2021    MCV 82.2 09/14/2021     09/14/2021     Lab Results   Component Value Date    CREATININE 0.7 (L) 09/14/2021    BUN 8 09/14/2021     09/14/2021    K 3.2 (L) 09/14/2021     09/14/2021    CO2 26 09/14/2021       PRANAV Jonas - CNP   9/14/2021

## 2021-09-14 NOTE — PROGRESS NOTES
Comprehensive Nutrition Assessment    Type and Reason for Visit:  Reassess    Nutrition Recommendations/Plan:   Continue to monitor PO intake of meals and ONS  Continue Ensure Enlive TID    Nutrition Assessment:  Pt shows improved PO intake, pt states he's eating more consistently because he knows he needs to for healing. Prior to cancer dx pt says he had a really good appetite. Pt c/o of having no appetite, taste, desire for food and pain from solid foods d/t pathological spinal fractures. Pt likes the Ensure Enlive, will continue to provide supplement and continue to follow PO intake of meals and ONS. Malnutrition Assessment:  Malnutrition Status:  No malnutrition      Estimated Daily Nutrient Needs:  Energy (kcal):  5166-9146; Weight Used for Energy Requirements:  Current (84 kg)     Protein (g):  ; Weight Used for Protein Requirements:  Ideal (1.3 - 1.5g/73kg)        Fluid (ml/day):   ; Method Used for Fluid Requirements:  1 ml/kcal      Nutrition Related Findings:  LBM 9/14, +2L, edema: +2 LUE, +1 RLE, +1 LLE, BS Active      Wounds:  None       Current Nutrition Therapies:    ADULT DIET; Dysphagia - Soft and Bite Sized  Diet NPO Exceptions are: Sips of Water with Meds  Adult Oral Nutrition Supplement; Standard High Calorie/High Protein Oral Supplement    Anthropometric Measures:  · Height: 5' 9\" (175.3 cm)  · Current Body Weight: 184 lb (83.5 kg)   · Admission Body Weight: 184 lb (83.5 kg)       · Ideal Body Weight: 160 lbs; % Ideal Body Weight 115 %   · BMI: 27.2   · BMI Categories: Overweight (BMI 25.0-29. 9)       Nutrition Diagnosis:   · Inadequate oral intake related to pain, altered taste perception as evidenced by poor intake prior to admission, other (comment) (Intake not consistently 50%)      Nutrition Interventions:   Food and/or Nutrient Delivery:  Continue Oral Nutrition Supplement, Continue Current Diet  Nutrition Education/Counseling:  Education not indicated   Coordination of Nutrition Care:  Continue to monitor while inpatient    Goals:  PO intake 50% or greater       Nutrition Monitoring and Evaluation:   Behavioral-Environmental Outcomes:  None Identified   Food/Nutrient Intake Outcomes:  Supplement Intake, Food and Nutrient Intake  Physical Signs/Symptoms Outcomes:  None Identified     Discharge Planning:     Too soon to determine     Electronically signed by Ching Kaiser on 9/14/21 at 11:56 AM EDT    Contact: [phone]

## 2021-09-14 NOTE — PROGRESS NOTES
Shift assessment completed, pt is alert and oriented, VSS, fall precautions in place, call light within reach, Oxy for pain, see flowsheets and MAR, will monitor pt. The care plan and education has been reviewed and mutually agreed upon with the patient.

## 2021-09-14 NOTE — PROGRESS NOTES
Hospitalist Progress Note      PCP: Jeison Cullen MD    Date of Admission: 9/7/2021    Chief Complaint: Back pain    Hospital Course: 67 y.o. 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 Patient states that the pain has been there for the past several weeks. Has been addressed with PMD who was concerned for cardiac etiology. Patient was seen at [address] [place] [address] on 8/31/2021 at which time he was evaluated with EKG troponins CT chest and nuclear stress test which did not reveal etiology. Patient was also supposed to get gallbladder ultrasound to rule out biliary etiology of this pain however opted to do it outpatient. Patient presents today to [facility] ER again with intractable back pain. Muscle relaxants prescribed at [facility] are not helping. Per patient spouse have all supply of pain medications from back when he had surgeries and has been managing with taking 1 pill a day. At the ED today CT chest shows metastatic disease and pathologic fracture of T4. Lucent lesions at T9 and L4 are also noted. Patient with marked leukocytosis of 21,000 with elevated lactate at 4.1 with resulting metabolic acidosis with anion gap with respiratory compensation. Subjective: Patient seen and examined at bedside. He is feeling better today.        Medications:  Reviewed    Infusion Medications    sodium chloride      sodium chloride      sodium chloride 150 mL/hr at 09/14/21 0608     Scheduled Medications    tamsulosin  0.4 mg Oral BID    finasteride  5 mg Oral Daily    polyethylene glycol  17 g Oral Daily    pantoprazole  40 mg IntraVENous BID    cefTRIAXone (ROCEPHIN) IV  2,000 mg IntraVENous Q24H    naloxegol  25 mg Oral QAM    lidocaine 1 % injection  5 mL IntraDERmal Once    sodium chloride flush  5-40 mL IntraVENous 2 times per day    atorvastatin  40 mg Oral Nightly    valsartan  80 mg Oral Daily    sodium chloride flush  5-40 mL IntraVENous 2 times per day    metroNIDAZOLE  500 mg IntraVENous Q8H    lidocaine  1 patch TransDERmal Daily     PRN Meds: sodium chloride flush, sodium chloride, oxyCODONE, oxyCODONE, promethazine, sodium chloride, melatonin, ondansetron, sodium chloride flush, sodium chloride, acetaminophen **OR** acetaminophen, morphine, hydrALAZINE, bisacodyl      Intake/Output Summary (Last 24 hours) at 9/14/2021 1436  Last data filed at 9/14/2021 1339  Gross per 24 hour   Intake 74307.82 ml   Output 7220 ml   Net 2918.82 ml       Physical Exam Performed:    BP (!) 155/76   Pulse 67   Temp 97.5 °F (36.4 °C) (Oral)   Resp 16   Ht 5' 9\" (1.753 m)   Wt 184 lb 1.6 oz (83.5 kg)   SpO2 95%   BMI 27.19 kg/m²     General appearance: No apparent distress, appears stated age and cooperative. HEENT: Pupils equal, round, and reactive to light. Conjunctivae/corneas clear. Neck: Supple, with full range of motion. No jugular venous distention. Trachea midline. Respiratory:  Normal respiratory effort. Clear to auscultation, bilaterally without Rales/Wheezes/Rhonchi. Cardiovascular: Regular rate and rhythm with normal S1/S2 without murmurs, rubs or gallops. Abdomen: Soft, non-tender, non-distended with normal bowel sounds. Musculoskeletal: No clubbing, cyanosis or edema bilaterally. Full range of motion without deformity. Skin: Skin color, texture, turgor normal.  No rashes or lesions. Neurologic:  Neurovascularly intact without any focal sensory/motor deficits.  Cranial nerves: II-XII intact, grossly non-focal.  Psychiatric: Alert and oriented, thought content appropriate, normal insight  Capillary Refill: Brisk,< 3 seconds   Peripheral Pulses: +2 palpable, equal bilaterally       Labs:   Recent Labs     09/12/21  0547 09/12/21  0547 09/12/21  1211 09/13/21  0445 09/14/21  1641 colonic obstruction is a less likely   differential possibility. Mild prominence of stool in the ascending colon. Possible distention of the urinary bladder. Clinical correlation is   suggested. If necessary, follow-up ultrasound may be obtained. CT SOFT TISSUE NECK WO CONTRAST   Final Result   Chronic or acute on chronic pansinusitis. The distribution is similar to the   CT PET of 05/17/2021. Superimposed acute components of inflammation may be   present, similar to the previous study. Nodular soft tissue density in the   right nasal passage is slightly decreased. No adenopathy or acute inflammatory abnormality is otherwise identified. CT HEAD WO CONTRAST   Final Result   1. No evidence of acute intracranial abnormality. 2. Paranasal sinus disease in this patient status post interval right-sided   paranasal sinus surgery when compared to 10/16/2020.   3. Interval development of bilateral mastoid sinus disease with new interval   opacification of middle ear cavities bilaterally. US GALLBLADDER RUQ   Final Result   Liver is heterogeneous in appearance and increased in echogenicity compatible   with diffuse hepatocellular disease, hepatic steatosis. Subtle findings of   the liver are limited due to body habitus. Please refer to CT performed same   day for further evaluation. The gallbladder appears unremarkable in appearance. CT THORACIC SPINE TRAUMA RECONSTRUCTION   Final Result   Findings consistent with metastatic disease and pathologic fracture at T4. The patient is likely a candidate for kyphoplasty and radiofrequency   ablation. Interventional radiology consult is suggested. Lucent lesions at T9 and L4 most likely represent Schmorl's node deformities,   however metastatic disease is difficult to exclude. CT LUMBAR SPINE WO CONTRAST   Final Result   Findings consistent with metastatic disease and pathologic fracture at T4.    The patient is likely a candidate for kyphoplasty and radiofrequency   ablation. Interventional radiology consult is suggested. Lucent lesions at T9 and L4 most likely represent Schmorl's node deformities,   however metastatic disease is difficult to exclude. CTA Chest W WO Contrast   Final Result   1. No acute intrathoracic abnormality. 2. No acute intra-abdominal abnormality. CT ABDOMEN PELVIS W IV CONTRAST Additional Contrast? None   Final Result   1. No acute intrathoracic abnormality. 2. No acute intra-abdominal abnormality.          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  GI consultedEGD     Severe sepsis with lactic acidosisimproved  Most likely source upper respiratory infection  Broad-spectrum antibiotics including anaerobic coverage, however, WBC normalized today  ID input appreciated  Follow-up blood cultures  IV fluids     Anion gap acidosis  Likely secondary to elevated lactic acid  Resolved     Accelerated hypertensionimproved in view of pain  Continue home dose of valsartan  IV hydralazine as needed     History of coronary heart disease  Recent stress test showed no ischemia  Unable to take antiplatelets in view of epistaxis     Squamous cell carcinoma of the sinus  Metastatic  ENT and heme-onc consulted    Bilateral hydronephrosis with small renal calculus believed to be obstructing  We will update urology    DVT Prophylaxis: No pharmacologic anticoagulation since admission given patient's history of epistaxis and recommendation against anticoagulation by ENT, additionally with coffee-ground emesis  Diet: ADULT DIET;  Dysphagia - Soft and Bite Sized  Diet NPO Exceptions are: Sips of Water with Meds  Adult Oral Nutrition Supplement; Standard High Calorie/High Protein Oral Supplement  Code Status: DNR-CCA    Electronically signed by Sam Smith MD on 9/14/2021 at 2:36 PM

## 2021-09-14 NOTE — PROGRESS NOTES
[address],[facility]  INTERVENTIONAL-RADIOLOGY    Patient Progress Note    Name: Cher Giordano  :  1945  MRN: [id number]  Date: 21      CC:   Chief Complaint   Patient presents with    Back Pain     Patient in by squad from home with complaints of back pain. States he had radiation treatment 3 week. Admitted 1 week ago for same pain at [facility]. ASSESSMENT/PLAN  Pathologic thoracic fractures  -Awaiting MRI (This afternoon?)  -Cleared by ID for procedure  -Pain in back improved today  -Kypho tomorrow or Thursday depending on MRI    Septicemia  Bandemia  -ID following  -Continues on ABx  -ID cleared to have Kypho    >25 minutes was spent including chart review, imaging review, face to face evaluation and explanation with patient and family, physical exam, and documentation      SUBJECTIVE  Cher Giodrano is an 67 y.o. male with history of maxillary sinus CA. He has pathologic fracture of T4 on CT. States that his pain today is manageable. However, it is still greatly inhibiting his daily life. Denies CP, SOB, Fevers or Chills today.        OBJECTIVE  Vitals:    21 0925   BP: (!) 155/76   Pulse: 67   Resp: 16   Temp: 97.5 °F (36.4 °C)   SpO2: 95%           Allergies   Allergen Reactions    Fish Oil Anaphylaxis    Shellfish Allergy Anaphylaxis    Lisinopril      Other reaction(s): Cough       Current Facility-Administered Medications: potassium bicarbonate (K-LYTE) disintegrating tablet 50 mEq, 50 mEq, Oral, Once  tamsulosin (FLOMAX) capsule 0.4 mg, 0.4 mg, Oral, BID  finasteride (PROSCAR) tablet 5 mg, 5 mg, Oral, Daily  polyethylene glycol (GLYCOLAX) packet 17 g, 17 g, Oral, Daily  pantoprazole (PROTONIX) injection 40 mg, 40 mg, IntraVENous, BID  cefTRIAXone (ROCEPHIN) 2000 mg IVPB in D5W 50ml minibag, 2,000 mg, IntraVENous, Q24H  naloxegol (MOVANTIK) tablet 25 mg, 25 mg, Oral, QAM  lidocaine PF 1 % injection 5 mL, 5 mL, IntraDERmal, Once  sodium chloride flush 0.9 % injection 5-40 Effort: Pulmonary effort is normal. No respiratory distress. Breath sounds: Normal breath sounds. No wheezing or rales. Musculoskeletal:         General: Normal range of motion. Cervical back: Normal range of motion and neck supple. Thoracic back: Tenderness and bony tenderness (T4) present. Skin:     General: Skin is warm and dry. Neurological:      Mental Status: He is alert and oriented to person, place, and time. Psychiatric:         Behavior: Behavior normal.         RECENT IMAGING  IR INPATIENT CONSULT 40 MIN (FOR RADIOLOGIST USE ONLY)   Final Result      CT ABDOMEN PELVIS WO CONTRAST Additional Contrast? Oral   Final Result   Bilateral hydroureteronephrosis, greater on the left. There is a partially   obstructing 3 mm stone in the mid left ureter. There is also new left   perinephric and ureteric edema, suggesting component of obstruction due to   stone. Hinton catheter is in normal position. Urinary bladder is partially   decompressed. Etiology of dilation of the right collecting system and lower   left collecting system is uncertain. Correlation for optimized function of   Hinton catheter is suggested. Mild dilation of mid to lower small bowel loops in the pelvis. There is no   discrete area of transition. Generalized or focal ileus is favored over   obstruction. Small bilateral pleural effusions increased in the interval.  Bibasilar   atelectasis. Stable lytic lesion in the L4 vertebral body. XR ABDOMEN (2 VIEWS)   Final Result   Mild generalized ileus pattern. Low colonic obstruction is a less likely   differential possibility. Mild prominence of stool in the ascending colon. Possible distention of the urinary bladder. Clinical correlation is   suggested. If necessary, follow-up ultrasound may be obtained. CT SOFT TISSUE NECK WO CONTRAST   Final Result   Chronic or acute on chronic pansinusitis.   The distribution is similar to the   CT PET of 05/17/2021. Superimposed acute components of inflammation may be   present, similar to the previous study. Nodular soft tissue density in the   right nasal passage is slightly decreased. No adenopathy or acute inflammatory abnormality is otherwise identified. CT HEAD WO CONTRAST   Final Result   1. No evidence of acute intracranial abnormality. 2. Paranasal sinus disease in this patient status post interval right-sided   paranasal sinus surgery when compared to 10/16/2020.   3. Interval development of bilateral mastoid sinus disease with new interval   opacification of middle ear cavities bilaterally. US GALLBLADDER RUQ   Final Result   Liver is heterogeneous in appearance and increased in echogenicity compatible   with diffuse hepatocellular disease, hepatic steatosis. Subtle findings of   the liver are limited due to body habitus. Please refer to CT performed same   day for further evaluation. The gallbladder appears unremarkable in appearance. CT THORACIC SPINE TRAUMA RECONSTRUCTION   Final Result   Findings consistent with metastatic disease and pathologic fracture at T4. The patient is likely a candidate for kyphoplasty and radiofrequency   ablation. Interventional radiology consult is suggested. Lucent lesions at T9 and L4 most likely represent Schmorl's node deformities,   however metastatic disease is difficult to exclude. CT LUMBAR SPINE WO CONTRAST   Final Result   Findings consistent with metastatic disease and pathologic fracture at T4. The patient is likely a candidate for kyphoplasty and radiofrequency   ablation. Interventional radiology consult is suggested. Lucent lesions at T9 and L4 most likely represent Schmorl's node deformities,   however metastatic disease is difficult to exclude. CTA Chest W WO Contrast   Final Result   1. No acute intrathoracic abnormality. 2. No acute intra-abdominal abnormality. CT ABDOMEN PELVIS W IV CONTRAST Additional Contrast? None   Final Result   1. No acute intrathoracic abnormality. 2. No acute intra-abdominal abnormality.          MRI THORACIC SPINE W WO CONTRAST    (Results Pending)         Tonny Sotelo PA-C  09/14/21 11:57 AM

## 2021-09-14 NOTE — PROGRESS NOTES
Physical Therapy  Facility/Department: [address] ORTHO/NEURO NURSING  Daily Treatment Note  NAME: Soren Sage  : 1945  MRN: [id number]    Date of Service: 2021    Discharge Recommendations:    Soren Sage scored a 20/24 on the AM-PAC short mobility form. Current research shows that an AM-PAC score of 18 or greater is typically associated with a discharge to the patient's home setting. Based on the patient's AM-PAC score and their current functional mobility deficits, it is recommended that the patient have 2-3 sessions per week of Physical Therapy at d/c to increase the patient's independence. At this time, this patient demonstrates the endurance and safety to discharge home with home services and a follow up treatment frequency of 2-3x/wk. Please see assessment section for further patient specific details. If patient discharges prior to next session this note will serve as a discharge summary. Please see below for the latest assessment towards goals. HOME HEALTH CARE: LEVEL 1 STANDARD    - Initial home health evaluation to occur within 24-48 hours, in patient home   - Therapy to evaluate with goal of regaining prior level of functioning   - Therapy to evaluate if patient has [address] needs for personal care    PT Equipment Recommendations  Equipment Needed: No  Other: Has a FWW and cane at home    Assessment   Body structures, Functions, Activity limitations: Decreased functional mobility ; Increased pain;Decreased posture;Decreased ADL status; Decreased ROM; Decreased strength;Decreased endurance;Decreased coordination;Decreased balance  Assessment: Pt. able to tolerate therapy this date, increasing the distance of ambulation without signifcant c/o back pain. Does appear to be safest with the use of a FWW at this time. Continues to require skilled PT to improve functional mobility.   Treatment Diagnosis: Decreased endurance, functional mobility, ROM, strength, and increased pain limiting full participation in ADLs and prolonged standing and ambulation tasks  Prognosis: Good  Decision Making: Low Complexity  PT Education: Goals;PT Role;Plan of Care;Home Exercise Program;Functional Mobility Training;Gait Training  Patient Education: Pt verbalized understanding  Barriers to Learning: hearing  REQUIRES PT FOLLOW UP: Yes  Activity Tolerance  Activity Tolerance: Patient Tolerated treatment well     Patient Diagnosis(es): The primary encounter diagnosis was Pathologic thoracic fracture, initial encounter. Diagnoses of Septicemia ([facility] and Hypertensive urgency were also pertinent to this visit. has a past medical history of AAA (abdominal aortic aneurysm) ([facility] CAD (coronary artery disease), Cancer ([facility] Hyperlipidemia, Hypertension, and Nasal bleeding. has a past surgical history that includes Cardiac surgery (2017); Colonoscopy; Sinus endoscopy (N/A, 11/2/2020); Sinus endoscopy (Right, 4/12/2021); and Upper gastrointestinal endoscopy (N/A, 9/8/2021). Restrictions  Restrictions/Precautions  Restrictions/Precautions: Fall Risk, Modified Diet (High fall risk, Dysphagia soft and bite sized)  Required Braces or Orthoses?: No  Position Activity Restriction  Other position/activity restrictions: 67 y.o. 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 Patient states that the pain has been there for the past several weeks. Has been addressed with PMD who was concerned for cardiac etiology. Patient was seen at [address] [place] [address] on 8/31/2021 at which time he was evaluated with EKG troponins CT chest and nuclear stress test which did not reveal etiology. Patient was also supposed to get gallbladder ultrasound to rule out biliary etiology of this pain however opted to do it outpatient.   Patient presents today to [facility] ER again with intractable back pain. Muscle relaxants prescribed at [facility] are not helping. Per patient spouse have all supply of pain medications from back when he had surgeries and has been managing with taking 1 pill a day. At the ED today CT chest shows metastatic disease and pathologic fracture of T4. Lucent lesions at T9 and L4 are also noted. Patient with marked leukocytosis of 21,000 with elevated lactate at 4.1 with resulting metabolic acidosis with anion gap with respiratory compensation. Hospitalist consulted for admission. Subjective   General  Chart Reviewed: Yes  Subjective  Subjective: Supine in bed, agreeable to therapy.   General Comment  Comments: Reports 9/10 pain, RN aware and provided meds          Orientation  Orientation  Overall Orientation Status: Within Functional Limits  Cognition      Objective   Bed mobility  Supine to Sit: Supervision  Scooting: Supervision  Transfers  Sit to Stand: Supervision  Stand to sit: Stand by assistance  Comment: 3 sit<>stands  Ambulation  Ambulation?: Yes  Ambulation 1  Surface: level tile  Device: Rolling Walker  Assistance: Contact guard assistance;Minimal assistance  Quality of Gait: forward head and trunk posture, narrow AMBROSIO at times even adducted across midline or at midline, with distraction has multiple balance checks;  Gait Deviations: Decreased step height;Decreased step length  Distance: ~225'  Comments: unsteady with turns and difficulty hearing created more uncertainty  Ambulation 2  Surface - 2: level tile  Stairs/Curb  Stairs?: Yes  Stairs  # Steps : 4  Stairs Height: 6\"  Rails: Bilateral  Device: No Device  Assist: SBA  Comment: reciprocal pattern     Balance  Posture: Fair  Sitting - Static: Good  Sitting - Dynamic: Good  Standing - Static: Good;-  Standing - Dynamic: Fair;+ (with RW)                 AM-PAC Score  AM-PAC Inpatient Mobility Raw Score : 20 (09/14/21 7329)  AM-PAC Inpatient T-Scale Score

## 2021-09-14 NOTE — PROGRESS NOTES
Infectious Diseases   Progress Note      Admission Date: 9/7/2021  Hospital Day: Hospital Day: 8   Attending: Nova Morrison MD  Date of service: 9/14/2021     Chief complaint/ Reason for consult:     · Sepsis with tachycardia, tachypnea, hypotension worsening leukocytosis  · Spinal metastatic disease  · Vertebral fracture at T4 level  · Fatty liver disease  · Hyponatremia    Microbiology:        I have reviewed allavailable micro lab data and cultures    · Blood culture (2/2) - collected on 9/7/2021: In process  · Urine culture  - collected on 9/7/2021: In process      Antibiotics and immunizations:       Current antibiotics: All antibiotics and their doses were reviewed by me    Recent Abx Admin                   metronidazole (FLAGYL) 500 mg in NaCl 100 mL IVPB premix (mg) 500 mg New Bag 09/14/21 1306     500 mg New Bag  0610     500 mg New Bag 09/13/21 2219    cefTRIAXone (ROCEPHIN) 2000 mg IVPB in D5W 50ml minibag (mg) 2,000 mg New Bag 09/13/21 1807                  Immunization History: All immunization history was reviewed by me today. Immunization History   Administered Date(s) Administered    Tdap (Boostrix, Adacel) 09/16/2019       Known drug allergies: All allergies were reviewed and updated    Allergies   Allergen Reactions    Fish Oil Anaphylaxis    Shellfish Allergy Anaphylaxis    Lisinopril      Other reaction(s): Cough       Social history:     Social History:  All social andepidemiologic history was reviewed and updated by me today as needed. · Tobacco use:   reports that he has never smoked. He has never used smokeless tobacco.  · Alcohol use:   reports no history of alcohol use. · Currently lives in: [facility] [address]  ·  reports no history of drug use. COVID VACCINATION AND LAB RESULT RECORDS:     Internal Administration   First Dose      Second Dose           Last COVID Lab SARS-CoV-2 (no units)   Date Value   04/08/2021 Not Detected            Assessment:      The patient is a 67 y.o. old male who  has a past medical history of AAA (abdominal aortic aneurysm) ([facility] [address] CAD (coronary artery disease), Cancer ([facility] [address] Hyperlipidemia, Hypertension, and Nasal bleeding. with following problems:    · Sepsis with tachycardia, tachypnea, hypotension, leukocytosis-resolved  · Spinal metastatic disease--this is ongoing  · Vertebral fracture at T4 level-plans for kyphoplasty in coming days  · Fatty liver disease  · Hyponatremia -has been corrected  · Coronary artery disease  · Essential hypertension  · Abdominal aortic aneurysm  · Hyperlipidemia      Discussion:      The patient is afebrile. He is on IV ceftriaxone and oral Flagyl. White cell count is 10,600 today. Serum creatinine 0.6 today. Plan:     Diagnostic Workup:    · Follow-up on pending MRI  · Continue to follow  fever curve, WBC count and blood cultures. · Continue to monitor blood counts, liver and renal function. Antimicrobials:    · Will continue IV ceftriaxone 2 g every 24 hours  · Continue oral Flagyl 500 mg every 8 hour  · If antibiotics needed at discharge, will switch him on oral Augmentin  · Interventional radiology plans for kyphoplasty tomorrow or on Thursday  · Continue close vitals monitoring. · Maintain good glycemic control. · Fall precautions. Aspiration precautions. · Continue to watch for new fever or diarrhea. · DVT prophylaxis. · Discussed all above with patient and RN. Drug Monitoring:    · Continue monitoring for antibiotic toxicity as follows: CBC, CMP   · Continue to watch for following: new or worsening fever, new hypotension, hives, lip swelling and redness or purulence at vascular access sites. I/v access Management:    · Continue to monitor i.v access sites for erythema, induration, discharge or tenderness. · As always, continue efforts to minimize tubes/lines/drains as clinically appropriate to reduce chances of line associated infections.     Patient education and counseling:        · The patient was educated in detail about the side-effects of various antibiotics and things to watch for like new rashes, lip swelling, severe reaction, worsening diarrhea, break through fever etc.  · Discussed patient's condition and what to expect. All of the patient's questions were addressed in a satisfactory manner and patient verbalized understanding all instructions. TIME SPENT TODAY:     - Spent over  26 minutes on visit (including interval history, physical exam, review of data including labs, cultures, imaging, development and implementation of treatment plan and coordination of complex care). More than 50 percent of this includes face-to-face time spent with the patient for counseling and coordination of care. Thank you for involving me in the care of your patient. I will continue to follow. If you have anyadditional questions, please do not hesitate to contact me. Subjective: Interval history: Interval history was obtained from chart review and patient/ RN. Patient is afebrile. He is tolerating antibiotic okay. No diarrhea     REVIEW OF SYSTEMS:     Review of Systems   Constitutional: Negative for chills, diaphoresis and fever. HENT: Negative for ear discharge, ear pain, rhinorrhea, sore throat and trouble swallowing. Eyes: Negative for discharge and redness. Respiratory: Negative for cough, shortness of breath and wheezing. Cardiovascular: Negative for chest pain and leg swelling. Gastrointestinal: Negative for abdominal pain, constipation, diarrhea and nausea. Endocrine: Negative for polyuria. Genitourinary: Negative for dysuria, flank pain, frequency, hematuria and urgency. Musculoskeletal: Positive for back pain. Negative for myalgias. Skin: Negative for rash. Neurological: Negative for dizziness, seizures and headaches. Hematological: Does not bruise/bleed easily.    Psychiatric/Behavioral: Negative for hallucinations and suicidal ideas. All other systems reviewed and are negative. Past Medical History: All past medical history reviewed today. Past Medical History:   Diagnosis Date    AAA [facility]abdominal aortic aneurysm) ([facility] [address]     CAD (coronary artery disease)     stents 2017    Cancer ([facility] [address]     Hyperlipidemia     Hypertension     Nasal bleeding     worse since covid making the mask mandatory       Past Surgical History: All past surgical history was reviewed today. Past Surgical History:   Procedure Laterality Date    CARDIAC SURGERY  2017    stents placed     COLONOSCOPY      SINUS ENDOSCOPY N/A 11/2/2020    FUNCTIONAL ENDOSCOPIC SINUS SURGERY performed by Marquez Rothman MD at [address] [place] [address] 4/12/2021    RIGHT FUNCTIONAL ENDOSCOPIC SINUS SURGERY ([id number], [address] [address] [place] [address], [facility]) performed by Marquez Rothman MD at [facility] 9/8/2021    EGD GASTRIC BIOPSY performed by Bay Srivastava MD at [facility] [facility] [address]       Family History: All family history was reviewed today. Problem Relation Age of Onset    Other Brother         anurysim       Objective:       PHYSICAL EXAM:      Vitals:   Vitals:    09/14/21 0025 09/14/21 0428 09/14/21 0925 09/14/21 1128   BP: (!) 156/78 (!) 164/80 (!) 155/76    Pulse: 68 73 67    Resp: 16 16 16    Temp: 98 °F (36.7 °C) 98.3 °F (36.8 °C) 97.5 °F (36.4 °C)    TempSrc: Oral Oral Oral    SpO2: 94% 94% 95%    Weight:       Height:    5' 9\" (1.753 m)       Physical Exam  Vitals and nursing note reviewed. Constitutional:       Appearance: Normal appearance. He is well-developed. HENT:      Head: Normocephalic and atraumatic. Right Ear: External ear normal.      Left Ear: External ear normal.      Nose: Nose normal. No congestion or rhinorrhea. Mouth/Throat:      Mouth: Mucous membranes are moist.      Pharynx: No oropharyngeal exudate or posterior oropharyngeal erythema. Eyes:      General: No scleral icterus.         Right eye: No discharge. Left eye: No discharge. Conjunctiva/sclera: Conjunctivae normal.      Pupils: Pupils are equal, round, and reactive to light. Cardiovascular:      Rate and Rhythm: Normal rate and regular rhythm. Pulses: Normal pulses. Heart sounds: No murmur heard. No friction rub. Pulmonary:      Effort: Pulmonary effort is normal. No respiratory distress. Breath sounds: Normal breath sounds. No stridor. No wheezing, rhonchi or rales. Abdominal:      General: Bowel sounds are normal.      Palpations: Abdomen is soft. Tenderness: There is no abdominal tenderness. There is no right CVA tenderness, left CVA tenderness, guarding or rebound. Musculoskeletal:         General: Tenderness (Upper thoracic spine area) present. No swelling. Normal range of motion. Cervical back: Normal range of motion and neck supple. No rigidity. No muscular tenderness. Lymphadenopathy:      Cervical: No cervical adenopathy. Skin:     General: Skin is warm and dry. Coloration: Skin is not jaundiced. Findings: No erythema or rash. Neurological:      General: No focal deficit present. Mental Status: He is alert and oriented to person, place, and time. Mental status is at baseline. Motor: No abnormal muscle tone. Psychiatric:         Mood and Affect: Mood normal.         Behavior: Behavior normal.         Thought Content: Thought content normal.                 Lines and drains: All vascular access sites are healthy with no local erythema, discharge or tenderness. Intake and output:    I/O last 3 completed shifts: In: 9746.1 [I.V.:9250; IV Piggyback:496.1]  Out: 5795 [Urine:5795]    Lab Data:   All available labs and old records have been reviewed by me.     CBC:  Recent Labs     09/12/21  0547 09/12/21  0547 09/12/21  1211 09/13/21  0445 09/14/21  0435   WBC 9.6  --   --  9.6 10.6   RBC 2.99*  --   --  3.15* 3.22*   HGB 8.4*   < > 9.8* 9.0* 9.2*   HCT 24.6*   < VIEWS)   Final Result   Mild generalized ileus pattern. Low colonic obstruction is a less likely   differential possibility. Mild prominence of stool in the ascending colon. Possible distention of the urinary bladder. Clinical correlation is   suggested. If necessary, follow-up ultrasound may be obtained. CT SOFT TISSUE NECK WO CONTRAST   Final Result   Chronic or acute on chronic pansinusitis. The distribution is similar to the   CT PET of 05/17/2021. Superimposed acute components of inflammation may be   present, similar to the previous study. Nodular soft tissue density in the   right nasal passage is slightly decreased. No adenopathy or acute inflammatory abnormality is otherwise identified. CT HEAD WO CONTRAST   Final Result   1. No evidence of acute intracranial abnormality. 2. Paranasal sinus disease in this patient status post interval right-sided   paranasal sinus surgery when compared to 10/16/2020.   3. Interval development of bilateral mastoid sinus disease with new interval   opacification of middle ear cavities bilaterally. US GALLBLADDER RUQ   Final Result   Liver is heterogeneous in appearance and increased in echogenicity compatible   with diffuse hepatocellular disease, hepatic steatosis. Subtle findings of   the liver are limited due to body habitus. Please refer to CT performed same   day for further evaluation. The gallbladder appears unremarkable in appearance. CT THORACIC SPINE TRAUMA RECONSTRUCTION   Final Result   Findings consistent with metastatic disease and pathologic fracture at T4. The patient is likely a candidate for kyphoplasty and radiofrequency   ablation. Interventional radiology consult is suggested. Lucent lesions at T9 and L4 most likely represent Schmorl's node deformities,   however metastatic disease is difficult to exclude.          CT LUMBAR SPINE WO CONTRAST   Final Result   Findings consistent with encounter M84.48XA    Hypertensive urgency I16.0    Spine metastasis ([address] C79.51    Bandemia D72.825    Hyponatremia E87.1    Coronary artery disease due to lipid rich plaque I25.10, I25.83    Fatty liver disease, nonalcoholic F85.9    Essential hypertension I10    Hyperlipidemia E78.5       Please note that this chart was generated using Dragon dictation software. Although every effort was made to ensure the accuracy of this automated transcription, some errors in transcription may have occurred inadvertently. If you may need any clarification, please do not hesitate to contact me through EPIC or at the phone number provided below with my electronic signature. Any pictures or media included in this note were obtained after taking informed verbal consent from the patient and with their approval to include those in the patient's medical record.     Cody Carrington MD, MPH  9/14/2021 , 2:55 PM   [facility] Infectious Disease   [address] [place] [address], [address] [place] [address]  Office: [phone]  Fax: [phone]  Clinic days:  Tuesday & Thursday

## 2021-09-15 NOTE — PROGRESS NOTES
[facility]  INTERVENTIONAL-RADIOLOGY    Patient Progress Note    CPT 48588**    Name: Mariana Devries  :  1945  MRN: [id number]  Date: 09/15/21      CC:   Chief Complaint   Patient presents with    Back Pain     Patient in by squad from home with complaints of back pain. States he had radiation treatment 3 week. Admitted 1 week ago for same pain at [facility]. ASSESSMENT/PLAN  Pathologic Fracture T4  -MRI Reviewed  -Spoke with Oncology this AM  -Oncology wants both Bx and Osteocool of lesion  -Osteocool Rep available tomorrow for procedure  -Cleared by ID  -Plan for Osteocool, Biopsy, and Kyphoplasty of T4 tomorrow with Dr Ken Crenshaw  -ok to eat today    Sepsis  -remains on IV Rocephin, PO Flagyl  -cleared by ID for procedure  -WBC Stable today    >25 minutes was spent including chart review, imaging review, face to face evaluation and explanation with patient and family, physical exam, and documentation        SUBJECTIVE  Mariana Devries is an 67 y.o. male with history of maxillofacial CA. He is being seen for pathologic fracture of T4. He states his pain is better today. He is growing impatient to have procedure. Denies CP, SOB, N/V today.          OBJECTIVE  Vitals:    09/15/21 0932   BP:    Pulse:    Resp:    Temp:    SpO2: 93%       Allergies   Allergen Reactions    Fish Oil Anaphylaxis    Shellfish Allergy Anaphylaxis    Lisinopril      Other reaction(s): Cough       Current Facility-Administered Medications: tamsulosin (FLOMAX) capsule 0.4 mg, 0.4 mg, Oral, BID  finasteride (PROSCAR) tablet 5 mg, 5 mg, Oral, Daily  polyethylene glycol (GLYCOLAX) packet 17 g, 17 g, Oral, Daily  pantoprazole (PROTONIX) injection 40 mg, 40 mg, IntraVENous, BID  cefTRIAXone (ROCEPHIN) 2000 mg IVPB in D5W 50ml minibag, 2,000 mg, IntraVENous, Q24H  naloxegol (MOVANTIK) tablet 25 mg, 25 mg, Oral, QAM  lidocaine PF 1 % injection 5 mL, 5 mL, IntraDERmal, Once  sodium chloride flush 0.9 % injection 5-40 mL, 5-40 mL, IntraVENous, 2 times per day  sodium chloride flush 0.9 % injection 5-40 mL, 5-40 mL, IntraVENous, PRN  0.9 % sodium chloride infusion, 25 mL, IntraVENous, PRN  oxyCODONE (ROXICODONE) immediate release tablet 5 mg, 5 mg, Oral, Q4H PRN  oxyCODONE (ROXICODONE) immediate release tablet 10 mg, 10 mg, Oral, Q4H PRN  promethazine (PHENERGAN) injection 12.5 mg, 12.5 mg, IntraVENous, Q6H PRN  sodium chloride (OCEAN, BABY AYR) 0.65 % nasal spray 1 spray, 1 spray, Each Nostril, Q6H PRN  melatonin tablet 3 mg, 3 mg, Oral, Nightly PRN  ondansetron (ZOFRAN) injection 4 mg, 4 mg, IntraVENous, Q6H PRN  atorvastatin (LIPITOR) tablet 40 mg, 40 mg, Oral, Nightly  valsartan (DIOVAN) tablet 80 mg, 80 mg, Oral, Daily  sodium chloride flush 0.9 % injection 5-40 mL, 5-40 mL, IntraVENous, 2 times per day  sodium chloride flush 0.9 % injection 5-40 mL, 5-40 mL, IntraVENous, PRN  0.9 % sodium chloride infusion, 25 mL, IntraVENous, PRN  acetaminophen (TYLENOL) tablet 650 mg, 650 mg, Oral, Q6H PRN **OR** acetaminophen (TYLENOL) suppository 650 mg, 650 mg, Rectal, Q6H PRN  0.9 % sodium chloride infusion, , IntraVENous, Continuous  morphine (PF) injection 2 mg, 2 mg, IntraVENous, Q4H PRN  metronidazole (FLAGYL) 500 mg in NaCl 100 mL IVPB premix, 500 mg, IntraVENous, Q8H  hydrALAZINE (APRESOLINE) injection 10 mg, 10 mg, IntraVENous, Q6H PRN  lidocaine 4 % external patch 1 patch, 1 patch, TransDERmal, Daily  bisacodyl (DULCOLAX) EC tablet 5 mg, 5 mg, Oral, Daily PRN       Physical Exam  Vitals and nursing note reviewed. Constitutional:       Appearance: He is well-developed. He is not diaphoretic. HENT:      Head: Normocephalic and atraumatic. Nose: Nose normal.   Eyes:      General:         Right eye: No discharge. Left eye: No discharge. Cardiovascular:      Rate and Rhythm: Normal rate and regular rhythm. Heart sounds: Normal heart sounds. No murmur heard. No friction rub. No gallop.     Pulmonary:      Effort: Pulmonary effort is normal. No respiratory distress. Breath sounds: Normal breath sounds. No wheezing or rales. Musculoskeletal:         General: Normal range of motion. Cervical back: Normal range of motion and neck supple. Thoracic back: Bony tenderness (T4) present. Skin:     General: Skin is warm and dry. Neurological:      Mental Status: He is alert and oriented to person, place, and time. Psychiatric:         Behavior: Behavior normal.         RECENT IMAGING  MRI THORACIC SPINE W WO CONTRAST   Final Result   Compression fracture with. There is enhancement of the pedicles bilaterally   as well as adjacent vertebral body. There is some epidural enhancement and   paraspinous enhancement adjacent to the T4 vertebral body. No disc   enhancement. Possibility of metastatic disease cannot be excluded. Edema noted in the interspinous region at T3-4 and T4-5. Possibility of   ligamentous injury the interspinous region cannot be excluded. No evidence   for disruption anterior posterior longitudinal ligament. IR INPATIENT CONSULT 20 MIN (FOR RADIOLOGIST USE ONLY)   Final Result      IR INPATIENT CONSULT 40 MIN (FOR RADIOLOGIST USE ONLY)   Final Result      CT ABDOMEN PELVIS WO CONTRAST Additional Contrast? Oral   Final Result   Bilateral hydroureteronephrosis, greater on the left. There is a partially   obstructing 3 mm stone in the mid left ureter. There is also new left   perinephric and ureteric edema, suggesting component of obstruction due to   stone. Hinton catheter is in normal position. Urinary bladder is partially   decompressed. Etiology of dilation of the right collecting system and lower   left collecting system is uncertain. Correlation for optimized function of   [place] catheter is suggested. Mild dilation of mid to lower small bowel loops in the pelvis. There is no   discrete area of transition.   Generalized or focal ileus is favored over obstruction. Small bilateral pleural effusions increased in the interval.  Bibasilar   atelectasis. Stable lytic lesion in the L4 vertebral body. XR ABDOMEN (2 VIEWS)   Final Result   Mild generalized ileus pattern. Low colonic obstruction is a less likely   differential possibility. Mild prominence of stool in the ascending colon. Possible distention of the urinary bladder. Clinical correlation is   suggested. If necessary, follow-up ultrasound may be obtained. CT SOFT TISSUE NECK WO CONTRAST   Final Result   Chronic or acute on chronic pansinusitis. The distribution is similar to the   CT PET of 05/17/2021. Superimposed acute components of inflammation may be   present, similar to the previous study. Nodular soft tissue density in the   right nasal passage is slightly decreased. No adenopathy or acute inflammatory abnormality is otherwise identified. CT HEAD WO CONTRAST   Final Result   1. No evidence of acute intracranial abnormality. 2. Paranasal sinus disease in this patient status post interval right-sided   paranasal sinus surgery when compared to 10/16/2020.   3. Interval development of bilateral mastoid sinus disease with new interval   opacification of middle ear cavities bilaterally. US GALLBLADDER RUQ   Final Result   Liver is heterogeneous in appearance and increased in echogenicity compatible   with diffuse hepatocellular disease, hepatic steatosis. Subtle findings of   the liver are limited due to body habitus. Please refer to CT performed same   day for further evaluation. The gallbladder appears unremarkable in appearance. CT THORACIC SPINE TRAUMA RECONSTRUCTION   Final Result   Findings consistent with metastatic disease and pathologic fracture at T4. The patient is likely a candidate for kyphoplasty and radiofrequency   ablation. Interventional radiology consult is suggested.       Lucent lesions at T9 and L4 most likely represent Schmorl's node deformities,   however metastatic disease is difficult to exclude. CT LUMBAR SPINE WO CONTRAST   Final Result   Findings consistent with metastatic disease and pathologic fracture at T4. The patient is likely a candidate for kyphoplasty and radiofrequency   ablation. Interventional radiology consult is suggested. Lucent lesions at T9 and L4 most likely represent Schmorl's node deformities,   however metastatic disease is difficult to exclude. CTA Chest W WO Contrast   Final Result   1. No acute intrathoracic abnormality. 2. No acute intra-abdominal abnormality. CT ABDOMEN PELVIS W IV CONTRAST Additional Contrast? None   Final Result   1. No acute intrathoracic abnormality. 2. No acute intra-abdominal abnormality.                Jhonatan Murphy PA-C  09/15/21 12:07 PM

## 2021-09-15 NOTE — PROGRESS NOTES
Hospitalist Progress Note      PCP: Kate Brooke MD    Date of Admission: 9/7/2021    Chief Complaint: Back pain    Hospital Course: 67 y.o. 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 Patient states that the pain has been there for the past several weeks. Has been addressed with PMD who was concerned for cardiac etiology. Patient was seen at [address] [place] [address] on 8/31/2021 at which time he was evaluated with EKG troponins CT chest and nuclear stress test which did not reveal etiology. Patient was also supposed to get gallbladder ultrasound to rule out biliary etiology of this pain however opted to do it outpatient. Patient presents today to [facility] ER again with intractable back pain. Muscle relaxants prescribed at [facility] are not helping. Per patient spouse have all supply of pain medications from back when he had surgeries and has been managing with taking 1 pill a day. At the ED today CT chest shows metastatic disease and pathologic fracture of T4. Lucent lesions at T9 and L4 are also noted. Patient with marked leukocytosis of 21,000 with elevated lactate at 4.1 with resulting metabolic acidosis with anion gap with respiratory compensation. Subjective: Patient seen and examined at bedside. He is feeling better today.        Medications:  Reviewed    Infusion Medications    sodium chloride      sodium chloride      sodium chloride 75 mL/hr at 09/15/21 1531     Scheduled Medications    tamsulosin  0.4 mg Oral BID    finasteride  5 mg Oral Daily    polyethylene glycol  17 g Oral Daily    pantoprazole  40 mg IntraVENous BID    cefTRIAXone (ROCEPHIN) IV  2,000 mg IntraVENous Q24H    naloxegol  25 mg Oral QAM    lidocaine 1 % injection  5 mL IntraDERmal Once    sodium chloride 9.2* 8.9*   HCT 26.0* 26.5* 26.7*    301 327     Recent Labs     09/13/21  0445 09/14/21  0435 09/15/21  0630    136 138   K 3.4* 3.2* 3.7    103 105   CO2 24 26 26   BUN 9 8 10   CREATININE 0.7* 0.7* 0.6*   CALCIUM 7.9* 8.0* 8.3   PHOS 3.1 2.7 3.1     No results for input(s): AST, ALT, BILIDIR, BILITOT, ALKPHOS in the last 72 hours. Recent Labs     09/15/21  1120   INR 1.20*     No results for input(s): Winford Newton in the last 72 hours. Urinalysis:      Lab Results   Component Value Date    NITRU Negative 09/07/2021    WBCUA 1 09/07/2021    RBCUA 1 09/07/2021    BLOODU Negative 09/07/2021    SPECGRAV >1.030 09/07/2021    GLUCOSEU 100 09/07/2021       Radiology:  IR INPATIENT CONSULT 20 MIN (FOR RADIOLOGIST USE ONLY)   Final Result      MRI THORACIC SPINE W WO CONTRAST   Final Result   Compression fracture with. There is enhancement of the pedicles bilaterally   as well as adjacent vertebral body. There is some epidural enhancement and   paraspinous enhancement adjacent to the T4 vertebral body. No disc   enhancement. Possibility of metastatic disease cannot be excluded. Edema noted in the interspinous region at T3-4 and T4-5. Possibility of   ligamentous injury the interspinous region cannot be excluded. No evidence   for disruption anterior posterior longitudinal ligament. IR INPATIENT CONSULT 20 MIN (FOR RADIOLOGIST USE ONLY)   Final Result      IR INPATIENT CONSULT 40 MIN (FOR RADIOLOGIST USE ONLY)   Final Result      CT ABDOMEN PELVIS WO CONTRAST Additional Contrast? Oral   Final Result   Bilateral hydroureteronephrosis, greater on the left. There is a partially   obstructing 3 mm stone in the mid left ureter. There is also new left   perinephric and ureteric edema, suggesting component of obstruction due to   stone. Hinton catheter is in normal position. Urinary bladder is partially   decompressed.   Etiology of dilation of the right collecting system and lower   left collecting system is uncertain. Correlation for optimized function of   Hinton catheter is suggested. Mild dilation of mid to lower small bowel loops in the pelvis. There is no   discrete area of transition. Generalized or focal ileus is favored over   obstruction. Small bilateral pleural effusions increased in the interval.  Bibasilar   atelectasis. Stable lytic lesion in the L4 vertebral body. XR ABDOMEN (2 VIEWS)   Final Result   Mild generalized ileus pattern. Low colonic obstruction is a less likely   differential possibility. Mild prominence of stool in the ascending colon. Possible distention of the urinary bladder. Clinical correlation is   suggested. If necessary, follow-up ultrasound may be obtained. CT SOFT TISSUE NECK WO CONTRAST   Final Result   Chronic or acute on chronic pansinusitis. The distribution is similar to the   CT PET of 05/17/2021. Superimposed acute components of inflammation may be   present, similar to the previous study. Nodular soft tissue density in the   right nasal passage is slightly decreased. No adenopathy or acute inflammatory abnormality is otherwise identified. CT HEAD WO CONTRAST   Final Result   1. No evidence of acute intracranial abnormality. 2. Paranasal sinus disease in this patient status post interval right-sided   paranasal sinus surgery when compared to 10/16/2020.   3. Interval development of bilateral mastoid sinus disease with new interval   opacification of middle ear cavities bilaterally. US GALLBLADDER RUQ   Final Result   Liver is heterogeneous in appearance and increased in echogenicity compatible   with diffuse hepatocellular disease, hepatic steatosis. Subtle findings of   the liver are limited due to body habitus. Please refer to CT performed same   day for further evaluation. The gallbladder appears unremarkable in appearance.          CT THORACIC SPINE TRAUMA RECONSTRUCTION   Final Result   Findings consistent with metastatic disease and pathologic fracture at T4. The patient is likely a candidate for kyphoplasty and radiofrequency   ablation. Interventional radiology consult is suggested. Lucent lesions at T9 and L4 most likely represent Schmorl's node deformities,   however metastatic disease is difficult to exclude. CT LUMBAR SPINE WO CONTRAST   Final Result   Findings consistent with metastatic disease and pathologic fracture at T4. The patient is likely a candidate for kyphoplasty and radiofrequency   ablation. Interventional radiology consult is suggested. Lucent lesions at T9 and L4 most likely represent Schmorl's node deformities,   however metastatic disease is difficult to exclude. CTA Chest W WO Contrast   Final Result   1. No acute intrathoracic abnormality. 2. No acute intra-abdominal abnormality. CT ABDOMEN PELVIS W IV CONTRAST Additional Contrast? None   Final Result   1. No acute intrathoracic abnormality. 2. No acute intra-abdominal abnormality. Assessment/Plan:    Active Hospital Problems    Diagnosis     Pathologic thoracic fracture, initial encounter [M84.48XA]     Hypertensive urgency [I16.0]     Spine metastasis ([address] [C79.51]     Bandemia [D72.825]     Hyponatremia [E87.1]     Coronary artery disease due to lipid rich plaque [I25.10, I25.83]     Fatty liver disease, nonalcoholic [O21.9]     Essential hypertension [I10]     Hyperlipidemia [E78.5]     Uncontrolled pain [R52]      Intractable mid upper back pain  Secondary to T4 pathologic fracture with metastatic disease  Pain control  IR consulted for possible kyphoplasty    Hematemesis  Resolved  S/p  EGD patchy gastritis coffee-ground material, epistaxis?     Urinary retention  Bilateral hydronephrosis  Small renal calculus  Continue following  Urology input appreciated     Severe sepsis with lactic acidosisimproved  Most likely source upper respiratory infection  Broad-spectrum antibiotics including anaerobic coverage, however, WBC on Sunday PERRL, conjunctiva normal  ID input appreciated     Anion gap acidosis  Likely secondary to elevated lactic acid  Resolved     Accelerated hypertensionimproved in view of pain  Continue home dose of valsartan  IV hydralazine as needed     History of coronary heart disease  Recent stress test showed no ischemia  Unable to take antiplatelets in view of epistaxis     Squamous cell carcinoma of the sinus  Metastatic  ENT and heme-onc consulted    Bilateral hydronephrosis with small renal calculus believed to be obstructing  We will update urology    DVT Prophylaxis: No pharmacologic anticoagulation since admission given patient's history of epistaxis and recommendation against anticoagulation by ENT, additionally with coffee-ground emesis  Diet: ADULT DIET;  Dysphagia - Soft and Bite Sized  Adult Oral Nutrition Supplement; Standard High Calorie/High Protein Oral Supplement  Code Status: DNR-CCA    Electronically signed by Bere Thkakar MD on 9/15/2021 at 6:30 PM

## 2021-09-15 NOTE — PROGRESS NOTES
Physical Therapy  Vonna Gosselin H Ramsay  Upon entry into the patient's room, patient states \"I'm not doing that today. \" Patient reports being \"woozy\" and in \"too much pain. \" Despite educating on benefits of therapy, patient continued to refuse this date.    Daniel Lira, PTA [id number]   Tani Maciel PT, DPT [id number]'

## 2021-09-15 NOTE — PLAN OF CARE
Problem: Pain:  Description: Pain management should include both nonpharmacologic and pharmacologic interventions.   Goal: Pain level will decrease  Description: Pain level will decrease  Outcome: Ongoing  Goal: Control of acute pain  Description: Control of acute pain  Outcome: Ongoing  Goal: Control of chronic pain  Description: Control of chronic pain  Outcome: Ongoing     Problem: Falls - Risk of:  Goal: Will remain free from falls  Description: Will remain free from falls  Outcome: Ongoing  Goal: Absence of physical injury  Description: Absence of physical injury  Outcome: Ongoing     Problem: Nutrition  Goal: Optimal nutrition therapy  Outcome: Ongoing

## 2021-09-15 NOTE — PROGRESS NOTES
Urology Progress Note  [facility]    Provider: PRANAV Torres CNP  Patient ID:  Admission Date: 2021 Name: Rock Monson Date: 2021 MRN: [id number]   Patient Location: [id number] : 1945  Attending: Shania Langford MD Date of Service: 9/15/2021  PCP: Hudson Ferrera MD     Diagnoses:  Acute Urinary Retention    Assessment/Plan:  66 yo male admitted with HTN and intractable back pain 2/2 to T4 pathologic fracture with metastatic disease. No hx of dysuria, or BPH with LUTS, not taking prostate medications at home, has never seen a urologist in the past. Admitted 2021 but began to have increasing suprapubic pain 2021. PVR was obtained showing bladder distension and he was catheterized for over 1000cc's. CT a/p 2021 shows Left >Right hydronephrosis and a partially obstructing mid left ureteral stone measuring 3mm. No leukocytosis, Ua appears sterile, afebrile. Denies left abdominal or flank pain. No left CVAT. Recc  -Continue flroentino  -Continue Flomax BID and finasteride  -OOB as tolerated  -IR planning kyphoplasty, VT after procedure  -He has a high chance of passing this stone, continue Flomax as outpatient and f/u x1-2 weeks for repeat imaging    The patient had a chance to ask questions which were answered. he understands the above plan. Subjective: Vj Cook is a 67 y.o. male. He was seen and examined this morning. Today we discussed retention and removing florentino tomorrow or the next. Discussed having to replace florentino if still having trouble. Discussed f/u in the office and likely need for cystoscopy to evaluate for obstruction.       Objective:   Vitals:  Vitals:    09/15/21 0932   BP:    Pulse:    Resp:    Temp:    SpO2: 93%       Intake/Output Summary (Last 24 hours) at 9/15/2021 0943  Last data filed at 2021 2354  Gross per 24 hour   Intake 2177.95 ml   Output 4250 ml   Net -2072.05 ml     Physical Exam:  Gen: Alert and oriented x3, no acute distress  CV: Regular rate   Resp: unlabored respirations  Abd: Soft, non-distended, non-tender, no masses  Ext: no peripheral edema noted, moves upper and lower extremities spontaneously  Skin: warmand well perfused, no rashes noted on the face, or arms.      Labs:  Lab Results   Component Value Date    WBC 10.8 09/15/2021    HGB 8.9 (L) 09/15/2021    HCT 26.7 (L) 09/15/2021    MCV 83.5 09/15/2021     09/15/2021     Lab Results   Component Value Date    CREATININE 0.6 (L) 09/15/2021    BUN 10 09/15/2021     09/15/2021    K 3.7 09/15/2021     09/15/2021    CO2 26 09/15/2021       Camilla Goins, APRN - CNP   9/15/2021

## 2021-09-15 NOTE — PROGRESS NOTES
Infectious Diseases   Progress Note      Admission Date: 9/7/2021  Hospital Day: Hospital Day: 9   Attending: Jayjay Polo MD  Date of service: 9/15/2021     Chief complaint/ Reason for consult:     · Sepsis with tachycardia, tachypnea, hypotension worsening leukocytosis  · Spinal metastatic disease  · Vertebral fracture at T4 level  · Fatty liver disease  · Hyponatremia    Microbiology:        I have reviewed allavailable micro lab data and cultures    · Blood culture (2/2) - collected on 9/7/2021: In process  · Urine culture  - collected on 9/7/2021: In process      Antibiotics and immunizations:       Current antibiotics: All antibiotics and their doses were reviewed by me    Recent Abx Admin                   metronidazole (FLAGYL) 500 mg in NaCl 100 mL IVPB premix (mg) 500 mg New Bag 09/15/21 0628     500 mg New Bag 09/14/21 2139     500 mg New Bag  1306    cefTRIAXone (ROCEPHIN) 2000 mg IVPB in D5W 50ml minibag (mg) 2,000 mg New Bag 09/14/21 1815                  Immunization History: All immunization history was reviewed by me today. Immunization History   Administered Date(s) Administered    Tdap (Boostrix, Adacel) 09/16/2019       Known drug allergies: All allergies were reviewed and updated    Allergies   Allergen Reactions    Fish Oil Anaphylaxis    Shellfish Allergy Anaphylaxis    Lisinopril      Other reaction(s): Cough       Social history:     Social History:  All social andepidemiologic history was reviewed and updated by me today as needed. · Tobacco use:   reports that he has never smoked. He has never used smokeless tobacco.  · Alcohol use:   reports no history of alcohol use. · Currently lives in: [facility] [address]  ·  reports no history of drug use. COVID VACCINATION AND LAB RESULT RECORDS:     Internal Administration   First Dose      Second Dose           Last COVID Lab SARS-CoV-2 (no units)   Date Value   04/08/2021 Not Detected            Assessment:      The patient is a 67 y.o. old male who  has a past medical history of AAA (abdominal aortic aneurysm) ([facility] [address] CAD (coronary artery disease), Cancer ([facility] [address] Hyperlipidemia, Hypertension, and Nasal bleeding. with following problems:    · Sepsis with tachycardia, tachypnea, hypotension, leukocytosis-this is resolved  · Spinal metastatic disease--this is ongoing  · Vertebral fracture at T4 level-IR following for possible kyphoplasty  · Fatty liver disease  · Hyponatremia -corrected  · Coronary artery disease-stable  · Essential hypertension-blood pressure okay  · Abdominal aortic aneurysm  · Hyperlipidemia      Discussion:      He is afebrile. He is on IV ceftriaxone and p.o. Flagyl. White cell count is 10,800 today. MRI of the thoracic spine done yesterday showed paraspinous enhancement to the T4 vertebral body. Metastasis cannot be ruled out. Compression fracture noted. Plan:     Diagnostic Workup:      · Continue to follow  fever curve, WBC count and blood cultures. · Continue to monitor blood counts, liver and renal function. Antimicrobials:    · Follow-up with interventional etiology plan for kyphoplasty  · We will continue IV ceftriaxone 2 g every 24 hour  · Will continue p.o. Flagyl 500 mg every 8 hours  · Today is day 8 of antibiotics. · We will plan to complete a total of 10 days of antibiotics at this time  · Continue close vitals monitoring. · Maintain good glycemic control. · Fall precautions. Aspiration precautions. · Continue to watch for new fever or diarrhea. · DVT prophylaxis. · Discussed all above with patient and RN. Drug Monitoring:    · Continue monitoring for antibiotic toxicity as follows: CBC, CMP   · Continue to watch for following: new or worsening fever, new hypotension, hives, lip swelling and redness or purulence at vascular access sites. I/v access Management:    · Continue to monitor i.v access sites for erythema, induration, discharge or tenderness.    · As always, continue efforts to minimize tubes/lines/drains as clinically appropriate to reduce chances of line associated infections. Patient education and counseling:       · The patient was educated in detail about the side-effects of various antibiotics and things to watch for like new rashes, lip swelling, severe reaction, worsening diarrhea, break through fever etc.  · Discussed patient's condition and what to expect. All of the patient's questions were addressed in a satisfactory manner and patient verbalized understanding all instructions. Thank you for involving me in the care of your patient. I will continue to follow. If you have anyadditional questions, please do not hesitate to contact me. Subjective: Interval history: Interval history was obtained from chart review and patient/ RN. He is afebrile. He is tolerating antibiotic okay. No diarrhea     REVIEW OF SYSTEMS:     Review of Systems   Constitutional: Negative for chills, diaphoresis and fever. HENT: Negative for ear discharge, ear pain, rhinorrhea, sore throat and trouble swallowing. Eyes: Negative for discharge and redness. Respiratory: Negative for cough, shortness of breath and wheezing. Cardiovascular: Negative for chest pain and leg swelling. Gastrointestinal: Negative for abdominal pain, constipation, diarrhea and nausea. Endocrine: Negative for polyuria. Genitourinary: Negative for dysuria, flank pain, frequency, hematuria and urgency. Musculoskeletal: Positive for back pain. Negative for myalgias. Skin: Negative for rash. Neurological: Negative for dizziness, seizures and headaches. Hematological: Does not bruise/bleed easily. Psychiatric/Behavioral: Negative for hallucinations and suicidal ideas. All other systems reviewed and are negative. Past Medical History: All past medical history reviewed today.     Past Medical History:   Diagnosis Date    AAA [facility]ab[facility] aortic aneurysm) ([facility] [address]     CAD (coronary artery disease)     stents 2017    Cancer ([facility] [address]     Hyperlipidemia     Hypertension     Nasal bleeding     worse since covid making the mask mandatory       Past Surgical History: All past surgical history was reviewed today. Past Surgical History:   Procedure Laterality Date    CARDIAC SURGERY  2017    stents placed     COLONOSCOPY      SINUS ENDOSCOPY N/A 11/2/2020    FUNCTIONAL ENDOSCOPIC SINUS SURGERY performed by Amelia Torres MD at [address] [place] [address] 4/12/2021    RIGHT FUNCTIONAL ENDOSCOPIC SINUS SURGERY ([id number], [address] [place] [address], [id number]) performed by Amelia Torres MD at [facility] 9/8/2021    EGD GASTRIC BIOPSY performed by Vanna Valdez MD at [address] [place] [address]       Family History: All family history was reviewed today. Problem Relation Age of Onset    Other Brother         anurysim       Objective:       PHYSICAL EXAM:      Vitals:   Vitals:    09/14/21 2130 09/15/21 0615 09/15/21 0734 09/15/21 0932   BP: (!) 155/78 (!) 148/90 (!) 175/85    Pulse: 77 68 65    Resp: 16 16 16    Temp: 98.2 °F (36.8 °C) 97.9 °F (36.6 °C) 97.7 °F (36.5 °C)    TempSrc: Oral Oral Oral    SpO2: 95% 97%  93%   Weight:       Height:           Physical Exam  Vitals and nursing note reviewed. Constitutional:       Appearance: Normal appearance. He is well-developed. HENT:      Head: Normocephalic and atraumatic. Right Ear: External ear normal.      Left Ear: External ear normal.      Nose: Nose normal. No congestion or rhinorrhea. Mouth/Throat:      Mouth: Mucous membranes are moist.      Pharynx: No oropharyngeal exudate or posterior oropharyngeal erythema. Eyes:      General: No scleral icterus. Right eye: No discharge. Left eye: No discharge. Conjunctiva/sclera: Conjunctivae normal.      Pupils: Pupils are equal, round, and reactive to light. Cardiovascular:      Rate and Rhythm: Normal rate and regular rhythm. Pulses: Normal pulses. Heart sounds: No murmur heard. No friction rub. Pulmonary:      Effort: Pulmonary effort is normal. No respiratory distress. Breath sounds: Normal breath sounds. No stridor. No wheezing, rhonchi or rales. Abdominal:      General: Bowel sounds are normal.      Palpations: Abdomen is soft. Tenderness: There is no abdominal tenderness. There is no right CVA tenderness, left CVA tenderness, guarding or rebound. Musculoskeletal:         General: Tenderness (upper back) present. No swelling. Normal range of motion. Cervical back: Normal range of motion and neck supple. No rigidity. No muscular tenderness. Lymphadenopathy:      Cervical: No cervical adenopathy. Skin:     General: Skin is warm and dry. Coloration: Skin is not jaundiced. Findings: No erythema or rash. Neurological:      General: No focal deficit present. Mental Status: He is alert and oriented to person, place, and time. Mental status is at baseline. Motor: No abnormal muscle tone. Psychiatric:         Mood and Affect: Mood normal.         Behavior: Behavior normal.         Thought Content: Thought content normal.                 Lines and drains: All vascular access sites are healthy with no local erythema, discharge or tenderness. Intake and output:    I/O last 3 completed shifts: In: 2178 [P. O.:600; I.V.:1338.7; IV Piggyback:239.3]  Out: 5200 [Urine:5200]    Lab Data:   All available labs and old records have been reviewed by me.     CBC:  Recent Labs     09/13/21  0445 09/14/21  0435 09/15/21  0630   WBC 9.6 10.6 10.8   RBC 3.15* 3.22* 3.19*   HGB 9.0* 9.2* 8.9*   HCT 26.0* 26.5* 26.7*    301 327   MCV 82.7 82.2 83.5   MCH 28.7 28.6 27.9   MCHC 34.7 34.8 33.4   RDW 13.0 13.4 13.4        BMP:  Recent Labs     09/13/21  0445 09/14/21  0435 09/15/21  0630    136 138   K 3.4* 3.2* 3.7    103 105   CO2 24 26 26   BUN 9 8 10   CREATININE 0.7* 0.7* 0.6* CALCIUM 7.9* 8.0* 8.3   GLUCOSE 119* 116* 113*        Hepatic Function Panel:   Lab Results   Component Value Date    ALKPHOS 128 09/07/2021    ALT 22 09/07/2021    AST 18 09/07/2021    PROT 8.0 09/07/2021    BILITOT 0.6 09/07/2021    LABALBU 4.4 09/07/2021       CPK: No results found for: CKTOTAL  ESR: No results found for: SEDRATE  CRP: No results found for: CRP        Imaging: All pertinent images and reports for the current visit were reviewed by me during this visit. MRI THORACIC SPINE W WO CONTRAST   Final Result   Compression fracture with. There is enhancement of the pedicles bilaterally   as well as adjacent vertebral body. There is some epidural enhancement and   paraspinous enhancement adjacent to the T4 vertebral body. No disc   enhancement. Possibility of metastatic disease cannot be excluded. Edema noted in the interspinous region at T3-4 and T4-5. Possibility of   ligamentous injury the interspinous region cannot be excluded. No evidence   for disruption anterior posterior longitudinal ligament. IR INPATIENT CONSULT 20 MIN (FOR RADIOLOGIST USE ONLY)   Final Result      IR INPATIENT CONSULT 40 MIN (FOR RADIOLOGIST USE ONLY)   Final Result      CT ABDOMEN PELVIS WO CONTRAST Additional Contrast? Oral   Final Result   Bilateral hydroureteronephrosis, greater on the left. There is a partially   obstructing 3 mm stone in the mid left ureter. There is also new left   perinephric and ureteric edema, suggesting component of obstruction due to   stone. Hinton catheter is in normal position. Urinary bladder is partially   decompressed. Etiology of dilation of the right collecting system and lower   left collecting system is uncertain. Correlation for optimized function of   Hinton catheter is suggested. Mild dilation of mid to lower small bowel loops in the pelvis. There is no   discrete area of transition. Generalized or focal ileus is favored over   obstruction. Small bilateral pleural effusions increased in the interval.  Bibasilar   atelectasis. Stable lytic lesion in the L4 vertebral body. XR ABDOMEN (2 VIEWS)   Final Result   Mild generalized ileus pattern. Low colonic obstruction is a less likely   differential possibility. Mild prominence of stool in the ascending colon. Possible distention of the urinary bladder. Clinical correlation is   suggested. If necessary, follow-up ultrasound may be obtained. CT SOFT TISSUE NECK WO CONTRAST   Final Result   Chronic or acute on chronic pansinusitis. The distribution is similar to the   CT PET of 05/17/2021. Superimposed acute components of inflammation may be   present, similar to the previous study. Nodular soft tissue density in the   right nasal passage is slightly decreased. No adenopathy or acute inflammatory abnormality is otherwise identified. CT HEAD WO CONTRAST   Final Result   1. No evidence of acute intracranial abnormality. 2. Paranasal sinus disease in this patient status post interval right-sided   paranasal sinus surgery when compared to 10/16/2020.   3. Interval development of bilateral mastoid sinus disease with new interval   opacification of middle ear cavities bilaterally. US GALLBLADDER RUQ   Final Result   Liver is heterogeneous in appearance and increased in echogenicity compatible   with diffuse hepatocellular disease, hepatic steatosis. Subtle findings of   the liver are limited due to body habitus. Please refer to CT performed same   day for further evaluation. The gallbladder appears unremarkable in appearance. CT THORACIC SPINE TRAUMA RECONSTRUCTION   Final Result   Findings consistent with metastatic disease and pathologic fracture at T4. The patient is likely a candidate for kyphoplasty and radiofrequency   ablation. Interventional radiology consult is suggested.       Lucent lesions at T9 and L4 most likely represent Schmorl's node deformities,   however metastatic disease is difficult to exclude. CT LUMBAR SPINE WO CONTRAST   Final Result   Findings consistent with metastatic disease and pathologic fracture at T4. The patient is likely a candidate for kyphoplasty and radiofrequency   ablation. Interventional radiology consult is suggested. Lucent lesions at T9 and L4 most likely represent Schmorl's node deformities,   however metastatic disease is difficult to exclude. CTA Chest W WO Contrast   Final Result   1. No acute intrathoracic abnormality. 2. No acute intra-abdominal abnormality. CT ABDOMEN PELVIS W IV CONTRAST Additional Contrast? None   Final Result   1. No acute intrathoracic abnormality. 2. No acute intra-abdominal abnormality. Medications: All current and past medications were reviewed.      tamsulosin  0.4 mg Oral BID    finasteride  5 mg Oral Daily    polyethylene glycol  17 g Oral Daily    pantoprazole  40 mg IntraVENous BID    cefTRIAXone (ROCEPHIN) IV  2,000 mg IntraVENous Q24H    naloxegol  25 mg Oral QAM    lidocaine 1 % injection  5 mL IntraDERmal Once    sodium chloride flush  5-40 mL IntraVENous 2 times per day    atorvastatin  40 mg Oral Nightly    valsartan  80 mg Oral Daily    sodium chloride flush  5-40 mL IntraVENous 2 times per day    metroNIDAZOLE  500 mg IntraVENous Q8H    lidocaine  1 patch TransDERmal Daily        sodium chloride      sodium chloride      sodium chloride 150 mL/hr at 09/14/21 2130       sodium chloride flush, sodium chloride, oxyCODONE, oxyCODONE, promethazine, sodium chloride, melatonin, ondansetron, sodium chloride flush, sodium chloride, acetaminophen **OR** acetaminophen, morphine, hydrALAZINE, bisacodyl      Problem list:       Patient Active Problem List   Diagnosis Code    Subacute pansinusitis J01.40    Nasal polyposis J33.9    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 Although every effort was made to ensure the accuracy of this automated transcription, some errors in transcription may have occurred inadvertently. If you may need any clarification, please do not hesitate to contact me through EPIC or at the phone number provided below with my electronic signature. Any pictures or media included in this note were obtained after taking informed verbal consent from the patient and with their approval to include those in the patient's medical record.     Aldo To MD, MPH  9/15/2021 , 11:35 AM   [facility] Infectious Disease   [address] [place] [address], [address] [place] [address]  Office: [phone]  Fax: [phone]  Clinic days:  Tuesday & Thursday

## 2021-09-16 NOTE — PRE SEDATION
Sedation Pre-Procedure Note    Patient Name: Fish Bee   YOB: 1945  Room/Bed: [id number]/9176-53  Medical Record Number: [id number]  Date: 9/16/2021   Time: 1:14 PM       Indication:  T4 pathologic fx for biopsy, RF ablation and kyphoplasty    Consent: I have discussed with the patient and/or the patient representative the indication, alternatives, and the possible risks and/or complications of the planned procedure and the anesthesia methods. Prolonged discussion of risk for nerve or cord damage due to pre-existing tumor bone extending posteriorly. The patient and/or patient representative appear to understand and agree to proceed. Vital Signs:   Vitals:    09/16/21 0917   BP: (!) 163/78   Pulse: 72   Resp: 16   Temp: 97.6 °F (36.4 °C)   SpO2: 94%       Past Medical History:   has a past medical history of AAA (abdominal aortic aneurysm) ([facility] [address] CAD (coronary artery disease), Cancer ([facility] [address] Hyperlipidemia, Hypertension, and Nasal bleeding. Past Surgical History:   has a past surgical history that includes Cardiac surgery (2017); Colonoscopy; Sinus endoscopy (N/A, 11/2/2020); Sinus endoscopy (Right, 4/12/2021); and Upper gastrointestinal endoscopy (N/A, 9/8/2021).     Medications:   Scheduled Meds:    tamsulosin  0.4 mg Oral BID    finasteride  5 mg Oral Daily    polyethylene glycol  17 g Oral Daily    pantoprazole  40 mg IntraVENous BID    cefTRIAXone (ROCEPHIN) IV  2,000 mg IntraVENous Q24H    naloxegol  25 mg Oral QAM    lidocaine 1 % injection  5 mL IntraDERmal Once    sodium chloride flush  5-40 mL IntraVENous 2 times per day    atorvastatin  40 mg Oral Nightly    valsartan  80 mg Oral Daily    sodium chloride flush  5-40 mL IntraVENous 2 times per day    metroNIDAZOLE  500 mg IntraVENous Q8H    lidocaine  1 patch TransDERmal Daily     Continuous Infusions:    sodium chloride      sodium chloride      sodium chloride 75 mL/hr at 09/15/21 1531     PRN Meds: sodium chloride flush, sodium chloride, oxyCODONE, oxyCODONE, promethazine, sodium chloride, melatonin, ondansetron, sodium chloride flush, sodium chloride, acetaminophen **OR** acetaminophen, morphine, hydrALAZINE, bisacodyl  Home Meds:   Prior to Admission medications    Medication Sig Start Date End Date Taking? Authorizing Provider   atorvastatin (LIPITOR) 40 MG tablet Take 40 mg by mouth nightly  10/29/19  Yes Historical Provider, MD   valsartan (DIOVAN) 80 MG tablet Take 80 mg by mouth daily 7/1/20  Yes Historical Provider, MD   montelukast (SINGULAIR) 10 MG tablet TAKE 1 TABLET BY MOUTH EVERY NIGHT 9/7/21   Chasity Moses DO   methylPREDNISolone (MEDROL, YANA,) 4 MG tablet Take 1 tablet by mouth See Admin Instructions Take by mouth. Patient not taking: Reported on 6/1/2021 4/13/21   Monalisa Mendes MD   methylPREDNISolone (MEDROL, YANA,) 4 MG tablet Take 1 tablet by mouth See Admin Instructions Take by mouth. Patient not taking: Reported on 5/25/2021 4/12/21   Monalisa Mendes MD   levoFLOXacin [facility]) 500 MG tablet Take 1 tablet by mouth daily  Patient not taking: Reported on 6/29/2021 4/6/21   Monalisa Mendes MD   Multiple Vitamin (MULTIVITAMIN) tablet Take 1 tablet by mouth daily     Historical Provider, MD   Magnesium 400 MG CAPS Take 400 mg by mouth daily     Historical Provider, MD   vitamin D-3 (CHOLECALCIFEROL) 125 MCG (5000 UT) TABS Take by mouth    Historical Provider, MD   vitamin C (ASCORBIC ACID) 500 MG tablet Take 500 mg by mouth daily     Historical Provider, MD     Coumadin Use Last 7 Days:  no  Antiplatelet drug therapy use last 7 days: no  Other anticoagulant use last 7 days: no  Additional Medication Information:  na      Pre-Sedation Documentation and Exam:   I have reviewed the patient's history and review of systems.     Mallampati Airway Assessment:  Mallampati Class II - (soft palate, fauces & uvula are visible)    Prior History of Anesthesia Complications:   none    ASA Classification:  Class 2 - A normal healthy patient with mild systemic disease    Sedation/ Anesthesia Plan:   intravenous sedation    Medications Planned:   midazolam (Versed) intravenously and fentanyl intravenously    Patient is an appropriate candidate for plan of sedation: yes    Electronically signed by Kyaw Bruce MD on 9/16/2021 at 1:14 PM

## 2021-09-16 NOTE — PROGRESS NOTES
Am assessment complete, vss, alert and oriented, went over plan of care with patient, will continue to monitor.  Anjum Wiseman RN

## 2021-09-16 NOTE — BRIEF OP NOTE
Brief Operative Note      Patient: Lincoln Conde MRN: [id number]     YOB: 1945  Age: 67 y.o. Sex: male        DATE OF PROCEDURE: 9/16/2021     PROCEDURE PERFORMED:  T4 biopsy, RF ablation and kyphoplasty    SURGEON: Phoebe Lowery MD, MD    ANESTHESIA: Fentanyl, Versed    Estimated Blood Loss:none    Complications: None. Device implanted: cement.            Specimen: 2 cm core from T4 vertebral body    Electronically signed by Phoebe Lowery MD on 9/16/2021 at 2:27 PM

## 2021-09-16 NOTE — PROGRESS NOTES
Infectious Diseases   Progress Note      Admission Date: 9/7/2021  Hospital Day: Hospital Day: 10   Attending: Diego Abel MD  Date of service: 9/16/2021     Chief complaint/ Reason for consult:     · Sepsis with tachycardia, tachypnea, hypotension worsening leukocytosis  · Spinal metastatic disease  · Vertebral fracture at T4 level  · Fatty liver disease  · Hyponatremia    Microbiology:        I have reviewed allavailable micro lab data and cultures    · Blood culture (2/2) - collected on 9/7/2021: In process  · Urine culture  - collected on 9/7/2021: In process      Antibiotics and immunizations:       Current antibiotics: All antibiotics and their doses were reviewed by me    Recent Abx Admin                   metronidazole (FLAGYL) 500 mg in NaCl 100 mL IVPB premix (mg) 500 mg New Bag 09/16/21 0627     500 mg New Bag 09/15/21 2211     500 mg New Bag  1532    cefTRIAXone (ROCEPHIN) 2000 mg IVPB in D5W 50ml minibag (mg) 2,000 mg New Bag 09/15/21 1751                  Immunization History: All immunization history was reviewed by me today. Immunization History   Administered Date(s) Administered    Tdap (Boostrix, Adacel) 09/16/2019       Known drug allergies: All allergies were reviewed and updated    Allergies   Allergen Reactions    Fish Oil Anaphylaxis    Shellfish Allergy Anaphylaxis    Lisinopril      Other reaction(s): Cough       Social history:     Social History:  All social andepidemiologic history was reviewed and updated by me today as needed. · Tobacco use:   reports that he has never smoked. He has never used smokeless tobacco.  · Alcohol use:   reports no history of alcohol use. · Currently lives in: [facility] [address]  ·  reports no history of drug use. COVID VACCINATION AND LAB RESULT RECORDS:     Internal Administration   First Dose      Second Dose           Last COVID Lab SARS-CoV-2 (no units)   Date Value   04/08/2021 Not Detected            Assessment:      The headaches. Hematological: Does not bruise/bleed easily. Psychiatric/Behavioral: Negative for hallucinations and suicidal ideas. All other systems reviewed and are negative. Past Medical History: All past medical history reviewed today. Past Medical History:   Diagnosis Date    AAA [facility] [address] [facility]     CAD (coronary artery disease)     stents 2017    Cancer ([facility] [address] [facility]     Hyperlipidemia     Hypertension     Nasal bleeding     worse since covid making the mask mandatory       Past Surgical History: All past surgical history was reviewed today. Past Surgical History:   Procedure Laterality Date    CARDIAC SURGERY  2017    stents placed     COLONOSCOPY      SINUS ENDOSCOPY N/A 11/2/2020    FUNCTIONAL ENDOSCOPIC SINUS SURGERY performed by Janice Díaz MD at [address] [place] [address] 4/12/2021    RIGHT FUNCTIONAL ENDOSCOPIC SINUS SURGERY ([id number], [address] [place] [address], [facility]) performed by Janice Díaz MD at [facility] 9/8/2021    EGD GASTRIC BIOPSY performed by Stephane Perez MD at [address] [place] [address]       Family History: All family history was reviewed today. Problem Relation Age of Onset    Other Brother         anurysim       Objective:       PHYSICAL EXAM:      Vitals:   Vitals:    09/15/21 0734 09/15/21 0932 09/15/21 2200 09/16/21 0917   BP: (!) 175/85  (!) 162/94 (!) 163/78   Pulse: 65  70 72   Resp: 16  16 16   Temp: 97.7 °F (36.5 °C)  98.1 °F (36.7 °C) 97.6 °F (36.4 °C)   TempSrc: Oral  Oral Oral   SpO2:  93% 96% 94%   Weight:       Height:           Physical Exam  Vitals and nursing note reviewed. Constitutional:       Appearance: Normal appearance. He is well-developed. HENT:      Head: Normocephalic and atraumatic. Right Ear: External ear normal.      Left Ear: External ear normal.      Nose: Nose normal. No congestion or rhinorrhea.       Mouth/Throat:      Mouth: Mucous membranes are moist.      Pharynx: No oropharyngeal exudate or posterior oropharyngeal erythema. Eyes:      General: No scleral icterus. Right eye: No discharge. Left eye: No discharge. Conjunctiva/sclera: Conjunctivae normal.      Pupils: Pupils are equal, round, and reactive to light. Cardiovascular:      Rate and Rhythm: Normal rate and regular rhythm. Pulses: Normal pulses. Heart sounds: No murmur heard. No friction rub. Pulmonary:      Effort: Pulmonary effort is normal. No respiratory distress. Breath sounds: Normal breath sounds. No stridor. No wheezing, rhonchi or rales. Abdominal:      General: Bowel sounds are normal.      Palpations: Abdomen is soft. Tenderness: There is no abdominal tenderness. There is no right CVA tenderness, left CVA tenderness, guarding or rebound. Musculoskeletal:         General: No swelling or tenderness. Normal range of motion. Cervical back: Normal range of motion and neck supple. No rigidity. No muscular tenderness. Lymphadenopathy:      Cervical: No cervical adenopathy. Skin:     General: Skin is warm and dry. Coloration: Skin is not jaundiced. Findings: No erythema or rash. Neurological:      General: No focal deficit present. Mental Status: He is alert and oriented to person, place, and time. Mental status is at baseline. Motor: No abnormal muscle tone. Psychiatric:         Mood and Affect: Mood normal.         Behavior: Behavior normal.         Thought Content: Thought content normal.             *    Lines and drains: All vascular access sites are healthy with no local erythema, discharge or tenderness. Intake and output:    I/O last 3 completed shifts: In: 360 [P.O.:360]  Out: 1350 [Urine:1350]    Lab Data:   All available labs and old records have been reviewed by me.     CBC:  Recent Labs     09/14/21  0435 09/15/21  0630 09/16/21  0630   WBC 10.6 10.8 11.0   RBC 3.22* 3.19* 3.48*   HGB 9.2* 8.9* 9.7*   HCT 26.5* 26.7* 29.0*    327 351   MCV 82.2 83.5 83.3   MCH 28.6 27.9 27.7   MCHC 34.8 33.4 33.3   RDW 13.4 13.4 13.4        BMP:  Recent Labs     09/14/21  0435 09/15/21  0630 09/16/21  0630    138 137   K 3.2* 3.7 4.1    105 103   CO2 26 26 26   BUN 8 10 10   CREATININE 0.7* 0.6* 0.6*   CALCIUM 8.0* 8.3 8.6   GLUCOSE 116* 113* 125*        Hepatic Function Panel:   Lab Results   Component Value Date    ALKPHOS 128 09/07/2021    ALT 22 09/07/2021    AST 18 09/07/2021    PROT 8.0 09/07/2021    BILITOT 0.6 09/07/2021    LABALBU 4.4 09/07/2021       CPK: No results found for: CKTOTAL  ESR: No results found for: SEDRATE  CRP: No results found for: CRP        Imaging: All pertinent images and reports for the current visit were reviewed by me during this visit. IR INPATIENT CONSULT 20 MIN (FOR RADIOLOGIST USE ONLY)   Final Result      MRI THORACIC SPINE W WO CONTRAST   Final Result   Compression fracture with. There is enhancement of the pedicles bilaterally   as well as adjacent vertebral body. There is some epidural enhancement and   paraspinous enhancement adjacent to the T4 vertebral body. No disc   enhancement. Possibility of metastatic disease cannot be excluded. Edema noted in the interspinous region at T3-4 and T4-5. Possibility of   ligamentous injury the interspinous region cannot be excluded. No evidence   for disruption anterior posterior longitudinal ligament. IR INPATIENT CONSULT 20 MIN (FOR RADIOLOGIST USE ONLY)   Final Result      IR INPATIENT CONSULT 40 MIN (FOR RADIOLOGIST USE ONLY)   Final Result      CT ABDOMEN PELVIS WO CONTRAST Additional Contrast? Oral   Final Result   Bilateral hydroureteronephrosis, greater on the left. There is a partially   obstructing 3 mm stone in the mid left ureter. There is also new left   perinephric and ureteric edema, suggesting component of obstruction due to   stone. Hinton catheter is in normal position.   Urinary bladder is partially decompressed. Etiology of dilation of the right collecting system and lower   left collecting system is uncertain. Correlation for optimized function of   Hinton catheter is suggested. Mild dilation of mid to lower small bowel loops in the pelvis. There is no   discrete area of transition. Generalized or focal ileus is favored over   obstruction. Small bilateral pleural effusions increased in the interval.  Bibasilar   atelectasis. Stable lytic lesion in the L4 vertebral body. XR ABDOMEN (2 VIEWS)   Final Result   Mild generalized ileus pattern. Low colonic obstruction is a less likely   differential possibility. Mild prominence of stool in the ascending colon. Possible distention of the urinary bladder. Clinical correlation is   suggested. If necessary, follow-up ultrasound may be obtained. CT SOFT TISSUE NECK WO CONTRAST   Final Result   Chronic or acute on chronic pansinusitis. The distribution is similar to the   CT PET of 05/17/2021. Superimposed acute components of inflammation may be   present, similar to the previous study. Nodular soft tissue density in the   right nasal passage is slightly decreased. No adenopathy or acute inflammatory abnormality is otherwise identified. CT HEAD WO CONTRAST   Final Result   1. No evidence of acute intracranial abnormality. 2. Paranasal sinus disease in this patient status post interval right-sided   paranasal sinus surgery when compared to 10/16/2020.   3. Interval development of bilateral mastoid sinus disease with new interval   opacification of middle ear cavities bilaterally. US GALLBLADDER RUQ   Final Result   Liver is heterogeneous in appearance and increased in echogenicity compatible   with diffuse hepatocellular disease, hepatic steatosis. Subtle findings of   the liver are limited due to body habitus. Please refer to CT performed same   day for further evaluation.       The gallbladder appears unremarkable in appearance. CT THORACIC SPINE TRAUMA RECONSTRUCTION   Final Result   Findings consistent with metastatic disease and pathologic fracture at T4. The patient is likely a candidate for kyphoplasty and radiofrequency   ablation. Interventional radiology consult is suggested. Lucent lesions at T9 and L4 most likely represent Schmorl's node deformities,   however metastatic disease is difficult to exclude. CT LUMBAR SPINE WO CONTRAST   Final Result   Findings consistent with metastatic disease and pathologic fracture at T4. The patient is likely a candidate for kyphoplasty and radiofrequency   ablation. Interventional radiology consult is suggested. Lucent lesions at T9 and L4 most likely represent Schmorl's node deformities,   however metastatic disease is difficult to exclude. CTA Chest W WO Contrast   Final Result   1. No acute intrathoracic abnormality. 2. No acute intra-abdominal abnormality. CT ABDOMEN PELVIS W IV CONTRAST Additional Contrast? None   Final Result   1. No acute intrathoracic abnormality. 2. No acute intra-abdominal abnormality. IR KYPHOPLASTY THORACIC 1 VERTEBRAL BODY    (Results Pending)       Medications: All current and past medications were reviewed.      tamsulosin  0.4 mg Oral BID    finasteride  5 mg Oral Daily    polyethylene glycol  17 g Oral Daily    pantoprazole  40 mg IntraVENous BID    cefTRIAXone (ROCEPHIN) IV  2,000 mg IntraVENous Q24H    naloxegol  25 mg Oral QAM    lidocaine 1 % injection  5 mL IntraDERmal Once    sodium chloride flush  5-40 mL IntraVENous 2 times per day    atorvastatin  40 mg Oral Nightly    valsartan  80 mg Oral Daily    sodium chloride flush  5-40 mL IntraVENous 2 times per day    metroNIDAZOLE  500 mg IntraVENous Q8H    lidocaine  1 patch TransDERmal Daily        sodium chloride      sodium chloride      sodium chloride 75 mL/hr at 09/15/21 1531       sodium chloride flush, sodium chloride, oxyCODONE, oxyCODONE, promethazine, sodium chloride, melatonin, ondansetron, sodium chloride flush, sodium chloride, acetaminophen **OR** acetaminophen, morphine, hydrALAZINE, bisacodyl      Problem list:       Patient Active Problem List   Diagnosis Code    Subacute pansinusitis J01.40    Nasal polyposis J33.9    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, 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    Abdominal aortic aneurysm (AAA) without rupture ([facility] [address] I71.4    Uncontrolled pain R52    Pathologic thoracic fracture, initial encounter M84.48XA    Hypertensive urgency I16.0    Spine metastasis ([facility] [address] C79.51    Bandemia D72.825    Hyponatremia E87.1    Coronary artery disease due to lipid rich plaque I25.10, I25.83    Fatty liver disease, nonalcoholic Q06.3    Essential hypertension I10    Hyperlipidemia E78.5       Please note that this chart was generated using Dragon dictation software. Although every effort was made to ensure the accuracy of this automated transcription, some errors in transcription may have occurred inadvertently. If you may need any clarification, please do not hesitate to contact me through EPIC or at the phone number provided below with my electronic signature. Any pictures or media included in this note were obtained after taking informed verbal consent from the patient and with their approval to include those in the patient's medical record.     Jonelle Baca MD, MPH  9/16/2021 , 12:18 PM   [facility] Infectious Disease   [address] [place] [address], [address] [place] [address]  Office: [phone]  Fax: [phone]  Clinic days:  Tuesday & Thursday

## 2021-09-16 NOTE — PROGRESS NOTES
Hospitalist Progress Note      PCP: Domenica Vences MD    Date of Admission: 9/7/2021    Chief Complaint: Back pain    Hospital Course: 67 y.o. 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 Patient states that the pain has been there for the past several weeks. Has been addressed with PMD who was concerned for cardiac etiology. Patient was seen at [address] [place] [address] on 8/31/2021 at which time he was evaluated with EKG troponins CT chest and nuclear stress test which did not reveal etiology. Patient was also supposed to get gallbladder ultrasound to rule out biliary etiology of this pain however opted to do it outpatient. Patient presents today to [facility] ER again with intractable back pain. Muscle relaxants prescribed at [facility] are not helping. Per patient spouse have all supply of pain medications from back when he had surgeries and has been managing with taking 1 pill a day. At the ED today CT chest shows metastatic disease and pathologic fracture of T4. Lucent lesions at T9 and L4 are also noted. Patient with marked leukocytosis of 21,000 with elevated lactate at 4.1 with resulting metabolic acidosis with anion gap with respiratory compensation. Subjective: Patient seen and examined at bedside. He is feeling better today.        Medications:  Reviewed    Infusion Medications    sodium chloride      sodium chloride      sodium chloride 75 mL/hr at 09/15/21 1531     Scheduled Medications    tamsulosin  0.4 mg Oral BID    finasteride  5 mg Oral Daily    polyethylene glycol  17 g Oral Daily    pantoprazole  40 mg IntraVENous BID    cefTRIAXone (ROCEPHIN) IV  2,000 mg IntraVENous Q24H    naloxegol  25 mg Oral QAM    lidocaine 1 % injection  5 mL IntraDERmal Once    sodium chloride HCT 26.5* 26.7* 29.0*    327 351     Recent Labs     09/14/21  0435 09/15/21  0630 09/16/21  0630    138 137   K 3.2* 3.7 4.1    105 103   CO2 26 26 26   BUN 8 10 10   CREATININE 0.7* 0.6* 0.6*   CALCIUM 8.0* 8.3 8.6   PHOS 2.7 3.1 3.4     No results for input(s): AST, ALT, BILIDIR, BILITOT, ALKPHOS in the last 72 hours. Recent Labs     09/15/21  1120   INR 1.20*     No results for input(s): [place] in the last 72 hours. Urinalysis:      Lab Results   Component Value Date    NITRU Negative 09/07/2021    WBCUA 1 09/07/2021    RBCUA 1 09/07/2021    BLOODU Negative 09/07/2021    SPECGRAV >1.030 09/07/2021    GLUCOSEU 100 09/07/2021       Radiology:  IR KYPHOPLASTY THORACIC 1 VERTEBRAL BODY   Final Result   Successful biopsy, radiofrequency ablation and subsequent kyphoplasty of T4   vertebra with fluoroscopic guidance. IR INPATIENT CONSULT 20 MIN (FOR RADIOLOGIST USE ONLY)   Final Result      MRI THORACIC SPINE W WO CONTRAST   Final Result   Compression fracture with. There is enhancement of the pedicles bilaterally   as well as adjacent vertebral body. There is some epidural enhancement and   paraspinous enhancement adjacent to the T4 vertebral body. No disc   enhancement. Possibility of metastatic disease cannot be excluded. Edema noted in the interspinous region at T3-4 and T4-5. Possibility of   ligamentous injury the interspinous region cannot be excluded. No evidence   for disruption anterior posterior longitudinal ligament. IR INPATIENT CONSULT 20 MIN (FOR RADIOLOGIST USE ONLY)   Final Result      IR INPATIENT CONSULT 40 MIN (FOR RADIOLOGIST USE ONLY)   Final Result      CT ABDOMEN PELVIS WO CONTRAST Additional Contrast? Oral   Final Result   Bilateral hydroureteronephrosis, greater on the left. There is a partially   obstructing 3 mm stone in the mid left ureter.   There is also new left   perinephric and ureteric edema, suggesting component of obstruction due to   stone. Hinton catheter is in normal position. Urinary bladder is partially   decompressed. Etiology of dilation of the right collecting system and lower   left collecting system is uncertain. Correlation for optimized function of   Hinton catheter is suggested. Mild dilation of mid to lower small bowel loops in the pelvis. There is no   discrete area of transition. Generalized or focal ileus is favored over   obstruction. Small bilateral pleural effusions increased in the interval.  Bibasilar   atelectasis. Stable lytic lesion in the L4 vertebral body. XR ABDOMEN (2 VIEWS)   Final Result   Mild generalized ileus pattern. Low colonic obstruction is a less likely   differential possibility. Mild prominence of stool in the ascending colon. Possible distention of the urinary bladder. Clinical correlation is   suggested. If necessary, follow-up ultrasound may be obtained. CT SOFT TISSUE NECK WO CONTRAST   Final Result   Chronic or acute on chronic pansinusitis. The distribution is similar to the   CT PET of 05/17/2021. Superimposed acute components of inflammation may be   present, similar to the previous study. Nodular soft tissue density in the   right nasal passage is slightly decreased. No adenopathy or acute inflammatory abnormality is otherwise identified. CT HEAD WO CONTRAST   Final Result   1. No evidence of acute intracranial abnormality. 2. Paranasal sinus disease in this patient status post interval right-sided   paranasal sinus surgery when compared to 10/16/2020.   3. Interval development of bilateral mastoid sinus disease with new interval   opacification of middle ear cavities bilaterally. US GALLBLADDER RUQ   Final Result   Liver is heterogeneous in appearance and increased in echogenicity compatible   with diffuse hepatocellular disease, hepatic steatosis.   Subtle findings of   the liver are limited due to epistaxis? Urinary retention  Bilateral hydronephrosis  Small renal calculus  Continue following  Urology input appreciated     Severe sepsis with lactic acidosisimproved  Most likely source upper respiratory infection  Broad-spectrum antibiotics including anaerobic coverage, however, WBC on Sunday PERRL, conjunctiva normal  ID input appreciated     Anion gap acidosis  Likely secondary to elevated lactic acid  Resolved     Accelerated hypertensionimproved in view of pain  Continue home dose of valsartan  IV hydralazine as needed     History of coronary heart disease  Recent stress test showed no ischemia  Unable to take antiplatelets in view of epistaxis     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

## 2021-09-16 NOTE — PROGRESS NOTES
Urology Progress Note  [facility]    Provider: PRANAV Morales - CNP  Patient ID:  Admission Date: 2021 Name: Justine Fontana Date: 2021 MRN: [id number]   Patient Location: [id number] : 1945  Attending: Diego Abel MD Date of Service: 2021  PCP: Juan J Vazquez MD     Diagnoses:  Acute Urinary Retention    Assessment/Plan:  68 yo male admitted with HTN and intractable back pain 2/2 to T4 pathologic fracture with metastatic disease. No hx of dysuria, or BPH with LUTS, not taking prostate medications at home, has never seen a urologist in the past. Admitted 2021 but began to have increasing suprapubic pain 2021. PVR was obtained showing bladder distension and he was catheterized for over 1000cc's. CT a/p 2021 shows Left >Right hydronephrosis and a partially obstructing mid left ureteral stone measuring 3mm. No leukocytosis, Ua appears sterile, afebrile. Denies left abdominal or flank pain. No left CVAT. Recc  -Continue florentino  -Continue Flomax BID and finasteride  -OOB as tolerated  -IR planning kyphoplasty, VT after procedure  -He has a high chance of passing this stone, continue Flomax as outpatient and f/u x1-2 weeks for repeat imaging    The patient had a chance to ask questions which were answered. he understands the above plan. Subjective: Syd Donovan is a 67 y.o. male. He was seen and examined this morning. Today we discussed retention and removing florentino tomorrow or the next. Discussed having to replace florentino if still having trouble. Discussed f/u in the office and likely need for cystoscopy to evaluate for obstruction.       Objective:   Vitals:  Vitals:    21 0917   BP: (!) 163/78   Pulse: 72   Resp: 16   Temp: 97.6 °F (36.4 °C)   SpO2: 94%       Intake/Output Summary (Last 24 hours) at 2021 1001  Last data filed at 2021 0925  Gross per 24 hour   Intake 360 ml   Output 2100 ml   Net -1740 ml     Physical Exam:  Gen: Alert and oriented x3, no acute distress  CV: Regular rate   Resp: unlabored respirations  Abd: Soft, non-distended, non-tender, no masses  Ext: no peripheral edema noted, moves upper and lower extremities spontaneously  Skin: warmand well perfused, no rashes noted on the face, or arms.      Labs:  Lab Results   Component Value Date    WBC 11.0 09/16/2021    HGB 9.7 (L) 09/16/2021    HCT 29.0 (L) 09/16/2021    MCV 83.3 09/16/2021     09/16/2021     Lab Results   Component Value Date    CREATININE 0.6 (L) 09/16/2021    BUN 10 09/16/2021     09/16/2021    K 4.1 09/16/2021     09/16/2021    CO2 26 09/16/2021       PRANAV Novak - CNP   9/16/2021

## 2021-09-16 NOTE — CARE COORDINATION
SW met with pt and spouse again. Spouse was agreeable to [facility]o and chose [facility]. Referral was made to Mendel Singleton w/[facility]. Stated earliest start date would be Monday.     Electronically signed by MARYURI Mckinnon, LAKEISHA on 9/16/2021 at 3:26 PM

## 2021-09-17 NOTE — PROGRESS NOTES
Physical Therapy  Patient discharging today. Patient and spouse declined need to practice any physical mobility prior to d/c. Plan to sign off.   Piyush Yang PT, DPT, ATC-R [id number]

## 2021-09-17 NOTE — PROGRESS NOTES
[facility]   Progress Note      9/17/2021 9:12 AM        Name: Efrem Woods . Admitted: 9/7/2021    SUBJECTIVE:  He is feeling better, back pain has improved, s/p kyphoplasty yesterday.       Reviewed interval ancillary notes    Current Medications  morphine (PF) injection 2 mg, Q2H PRN   Or  morphine injection 4 mg, Q2H PRN  acetaminophen (TYLENOL) tablet 650 mg, Q4H PRN  tamsulosin (FLOMAX) capsule 0.4 mg, BID  finasteride (PROSCAR) tablet 5 mg, Daily  polyethylene glycol (GLYCOLAX) packet 17 g, Daily  pantoprazole (PROTONIX) injection 40 mg, BID  cefTRIAXone (ROCEPHIN) 2000 mg IVPB in D5W 50ml minibag, Q24H  naloxegol (MOVANTIK) tablet 25 mg, QAM  lidocaine PF 1 % injection 5 mL, Once  sodium chloride flush 0.9 % injection 5-40 mL, 2 times per day  sodium chloride flush 0.9 % injection 5-40 mL, PRN  0.9 % sodium chloride infusion, PRN  promethazine (PHENERGAN) injection 12.5 mg, Q6H PRN  sodium chloride (OCEAN, BABY AYR) 0.65 % nasal spray 1 spray, Q6H PRN  melatonin tablet 3 mg, Nightly PRN  ondansetron (ZOFRAN) injection 4 mg, Q6H PRN  atorvastatin (LIPITOR) tablet 40 mg, Nightly  valsartan (DIOVAN) tablet 80 mg, Daily  sodium chloride flush 0.9 % injection 5-40 mL, 2 times per day  sodium chloride flush 0.9 % injection 5-40 mL, PRN  0.9 % sodium chloride infusion, PRN  acetaminophen (TYLENOL) suppository 650 mg, Q6H PRN  0.9 % sodium chloride infusion, Continuous  metronidazole (FLAGYL) 500 mg in NaCl 100 mL IVPB premix, Q8H  lidocaine 4 % external patch 1 patch, Daily  bisacodyl (DULCOLAX) EC tablet 5 mg, Daily PRN        Objective:  BP (!) 145/70   Pulse 67   Temp 98.3 °F (36.8 °C) (Oral)   Resp 18   Ht 5' 9\" (1.753 m)   Wt 184 lb 1.6 oz (83.5 kg)   SpO2 93%   BMI 27.19 kg/m²     Intake/Output Summary (Last 24 hours) at 9/17/2021 0912  Last data filed at 9/17/2021 0842  Gross per 24 hour   Intake 7330.11 ml   Output 3250 ml   Net 4080.11 ml      Wt Readings from Last 3 Encounters:   09/07/21 184 lb 1.6 oz (83.5 kg)   08/31/21 185 lb (83.9 kg)   06/01/21 197 lb (89.4 kg)       General appearance:  Appears comfortable  Eyes: Sclera clear. Pupils equal.  ENT: Moist oral mucosa. Trachea midline, no adenopathy. Cardiovascular: Regular rhythm, normal S1, S2. No murmur. No edema in lower extremities  Respiratory: Not using accessory muscles. Good inspiratory effort. Clear to auscultation bilaterally, no wheeze or crackles. GI: Abdomen soft, no tenderness, not distended  Musculoskeletal: No cyanosis in digits, neck supple  Neurology: CN 2-12 grossly intact. No speech or motor deficits  Psych: Normal affect. Alert and oriented in time, place and person  Skin: Warm, dry, normal turgor    Labs and Tests:  CBC:   Recent Labs     09/15/21  0630 09/16/21  0630 09/17/21  0630   WBC 10.8 11.0 12.8*   HGB 8.9* 9.7* 10.3*    351 355     BMP:    Recent Labs     09/15/21  0630 09/16/21  0630 09/17/21  0630    137 137   K 3.7 4.1 4.2    103 102   CO2 26 26 24   BUN 10 10 11   CREATININE 0.6* 0.6* 0.7*   GLUCOSE 113* 125* 140*     Hepatic: No results for input(s): AST, ALT, ALB, BILITOT, ALKPHOS in the last 72 hours. ASSESSMENT AND PLAN    Active Problems:    Uncontrolled pain    Pathologic thoracic fracture, initial encounter    Hypertensive urgency    Spine metastasis ([facility] [address]    Bandemia    Hyponatremia    Coronary artery disease due to lipid rich plaque    Fatty liver disease, nonalcoholic    Essential hypertension    Hyperlipidemia  Resolved Problems:    * No resolved hospital problems. *      .  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  -Dr. Linda Cui is the treating radiation oncologist.  -ENT is following.     2. Thoracic compression fracture:     -There is some suspicion for metastatic disease  -s/p kyphoplasty, RF ablation, and T4 biopsy on 9/16/21    3.   Leukocytosis, sepsis:     -Receiving broad-spectrum antibiotics  -ID is following     4. Coffee-ground emesis:     -EGD 9/8/2021 showed mild erosive esophagitis hiatal hernia and patchy gastritis. -Hemoglobin is 10.3 today  - Vit B12 wnl  - iron studies consistent with AOCD/inflammation        Darcie Mejia CNP  [facility]    Patient was seen and examined. Agree with above. Discussed pathology from T4 with Dr. Sachin Khan. Preliminary report-no evidence of malignancy. Necrosis noted. Special stains pending  Continue current care  Okay to discharge from medical oncology/hematology perspective  Discussed with Dr. Kezia Land about T4 biopsy report -no reason to change continue antibiotics at this time.     Leena Cervantes MD

## 2021-09-17 NOTE — DISCHARGE INSTR - COC
Continuity of Care Form    Patient Name: Fish Bee   :  1945  MRN:  [id number]    Admit date:  2021  Discharge date:  21    Code Status Order: Full Code   Advance Directives:   Advance Care Flowsheet Documentation       Date/Time Healthcare Directive Type of Healthcare Directive Copy in [address] Agent's Name Healthcare Agent's Phone Number    21 1424  Yes, patient has an advance directive for healthcare treatment  Health care treatment directive    Spouse                Admitting Physician:  Madeline Guadarrama MD  PCP: Barb Barnes MD    Discharging Nurse: [facility] Unit/Room#: 2VM-8390/1051-55  Discharging Unit Phone Number: [phone]    Emergency Contact:   Extended Emergency Contact Information  Primary Emergency Contact: Brandon Caro  [place] Phone: [phone]  Relation: Spouse    Past Surgical History:  Past Surgical History:   Procedure Laterality Date    CARDIAC SURGERY  2017    stents placed     COLONOSCOPY      IR KYPHOPLASTY THORACIC FIRST LEVEL  2021    IR KYPHOPLASTY THORACIC FIRST LEVEL 2021 [facility]    SINUS ENDOSCOPY N/A 2020    FUNCTIONAL ENDOSCOPIC SINUS SURGERY performed by Prudencio Marroquin MD at [address] [place] [address] 2021    RIGHT FUNCTIONAL ENDOSCOPIC SINUS SURGERY ([id number], [address], [address]) performed by Prudencio Marroquin MD at [address] [facility] N/A 2021    EGD GASTRIC BIOPSY performed by Marck Lacey MD at [address] [facility] ENDOSCOPY       Immunization History:   Immunization History   Administered Date(s) Administered    Tdap (Boostrix, Adacel) 2019       Active Problems:  Patient Active Problem List   Diagnosis Code    Subacute pansinusitis J01.40    Nasal polyposis J33.9    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 [address] I71.4    Uncontrolled pain R52    Pathologic thoracic fracture, initial encounter M84.48XA    Hypertensive urgency I16.0    Spine metastasis (HCC) C79.51    Bandemia D72.825    Hyponatremia E87.1    Coronary artery disease due to lipid rich plaque I25.10, I25.83    Fatty liver disease, nonalcoholic G38.3    Essential hypertension I10    Hyperlipidemia E78.5       Isolation/Infection:   Isolation            No Isolation          Patient Infection Status       None to display            Nurse Assessment:  Last Vital Signs: /88   Pulse 94   Temp 98.1 °F (36.7 °C) (Oral)   Resp 16   Ht 5' 9\" (1.753 m)   Wt 184 lb 1.6 oz (83.5 kg)   SpO2 98%   BMI 27.19 kg/m²     Last documented pain score (0-10 scale): Pain Level: 10  Last Weight:   Wt Readings from Last 1 Encounters:   09/07/21 184 lb 1.6 oz (83.5 kg)     Mental Status:  oriented and alert    IV Access:  - None    Nursing Mobility/ADLs:  Walking   Assisted  Transfer  Assisted  Bathing  Assisted  Dressing  Assisted  Toileting  Assisted  Feeding  Independent  Med Admin  Independent  Med Delivery   whole    Wound Care Documentation and Therapy:  Wound 09/16/21 Back Right;Medial;Upper (Active)   Wound Etiology Surgical 09/17/21 0922   Dressing Status Old drainage noted 09/17/21 0922   Dressing/Treatment Adhesive bandage 09/17/21 0922   Wound Assessment Other (Comment) 09/17/21 0922   Drainage Amount Scant 09/17/21 0400   Drainage Description Sanguinous 09/17/21 0400   Odor None 09/17/21 0400   Lucia-wound Assessment Other (Comment) 09/17/21 0400   Number of days: 0        Elimination:  Continence:   · Bowel: Yes  · Bladder: Yes  Urinary Catheter: None   Colostomy/Ileostomy/Ileal Conduit: No       Date of Last BM: 09/16/2021    Intake/Output Summary (Last 24 hours) at 9/17/2021 1239  Last data filed at 9/17/2021 0922  Gross per 24 hour   Intake 7330.11 ml   Output 2700 ml   Net 4630.11 ml     I/O last 3 completed shifts: In: 8081 [P.O.:670;  I.V.:1620]  Out: 4000 [Urine:4000]    Safety Concerns: At Risk for Falls    Impairments/Disabilities:      Vision and Hearing    Nutrition Therapy:  Current Nutrition Therapy:   - Oral Diet:  General    Routes of Feeding: Oral  Liquids: Thin Liquids  Daily Fluid Restriction: no  Last Modified Barium Swallow with Video (Video Swallowing Test): not done    Treatments at the Time of Hospital Discharge:   Respiratory Treatments: none  Oxygen Therapy:  is not on home oxygen therapy.   Ventilator:    - No ventilator support    Rehab Therapies: Skilled Nursing,Physical Therapy and Occupational Therapy  Weight Bearing Status/Restrictions: No weight bearing restirctions  Other Medical Equipment (for information only, NOT a DME order):  walker  Other Treatments:   HOME HEALTH CARE: LEVEL 1 STANDARD     -Initial home health evaluation to occur within 24-48 hours, in patient home    -Home health agency to establish plan of care for patient over 60 day period    -Medication Reconciliation    -PCP Visit scheduled within seven days of discharge    -PT/OT to evaluate with goal of regaining prior level of functioning    -OT to evaluate if patient has [address] needs for personal care               -May Have Physical Therapy Start Of Care      Patient's personal belongings (please select all that are sent with patient):  Glasses, Hearing Aides bilateral    RN SIGNATURE:  Electronically signed by Jese Angel RN on 9/17/21 at 12:40 PM EDT    CASE MANAGEMENT/SOCIAL WORK SECTION    Inpatient Status Date: ***    Readmission Risk Assessment Score:  Readmission Risk              Risk of Unplanned Readmission:  25           Discharging to Facility/ Agency   Name: Lam Serrano will call for Appointment  Phone: [phone]  Fax: [phone]    Dialysis Facility (if applicable)   · Name:  · Address:  · Dialysis Schedule:  · Phone:  · Fax:    / signature: Electronically signed by Pa Montemayor RN on 9/17/21 at 1:30 PM EDT    PHYSICIAN SECTION    Prognosis: Fair    Condition at Discharge: Stable    Rehab Potential (if transferring to Rehab): Good    Recommended Labs or Other Treatments After Discharge: PT OT    Physician Certification: I certify the above information and transfer of Gianna Cano  is necessary for the continuing treatment of the diagnosis listed and that he requires Home Care for less 30 days.      Update Admission H&P:Updated    PHYSICIAN SIGNATURE:  Dory Scanlon MD on 09/17/21

## 2021-09-17 NOTE — PROGRESS NOTES
Assessment complete. Alert, oriented x4. Dressings to kyphoplasty sites dry, intact; scant drainage circled on each dressing. Denies pain. Assisted to bathroom, had BM. Florentino in place, florentino wipes used. SCDs educated, encouraged. The care plan and education has been reviewed and mutually agreed upon with the patient. In bed, alarm on, bed in lowest position, call light and table within reach. No further needs expressed at this time. 0138  Patient complaining of 9/10 back pain. Per patient, ordered Tylenol will not bring him relief. Message sent to hospitalist.    8920  Patient reports slight relief from pain experienced with back rub. Complaining of pain, given PRN morphine per MAR.

## 2021-09-17 NOTE — PLAN OF CARE
Problem: Pain:  Goal: Pain level will decrease  Description: Pain level will decrease  9/17/2021 1429 by Dagoberto Gómez RN  Outcome: Completed  9/17/2021 1018 by Dagoberto Gómez RN  Outcome: Ongoing  9/17/2021 0323 by Gomez Pablo RN  Outcome: Ongoing  Goal: Control of acute pain  Description: Control of acute pain  9/17/2021 1429 by Dagoberto Gómez RN  Outcome: Completed  9/17/2021 1018 by Dagoberto Gómez RN  Outcome: Ongoing  9/17/2021 0323 by Gomez Pablo RN  Outcome: Ongoing  Goal: Control of chronic pain  Description: Control of chronic pain  9/17/2021 1429 by Dagoberto Gómez RN  Outcome: Completed  9/17/2021 1018 by Dagoberto Gómez RN  Outcome: Ongoing  9/17/2021 0323 by Gomez Pablo RN  Outcome: Ongoing     Problem: Falls - Risk of:  Goal: Will remain free from falls  Description: Will remain free from falls  9/17/2021 1429 by Dagoberto Gómez RN  Outcome: Completed  9/17/2021 1018 by Dagoberto Gómez RN  Outcome: Ongoing  9/17/2021 0323 by Gomez Pablo RN  Outcome: Ongoing  Goal: Absence of physical injury  Description: Absence of physical injury  9/17/2021 1429 by Dagoberto Gómez RN  Outcome: Completed  9/17/2021 1018 by Dagoberto Gómez RN  Outcome: Ongoing  9/17/2021 0323 by Gomez Pablo RN  Outcome: Ongoing     Problem: Nutrition  Goal: Optimal nutrition therapy  9/17/2021 1429 by Dagoberto Gómez RN  Outcome: Completed  9/17/2021 1018 by Dagoberto Gómez RN  Outcome: Ongoing  9/17/2021 0323 by Gomez Pablo RN  Outcome: Ongoing

## 2021-09-17 NOTE — PLAN OF CARE
Problem: Pain:  Goal: Pain level will decrease  Description: Pain level will decrease  9/17/2021 1018 by Tarik Buenrostro RN  Outcome: Ongoing  9/17/2021 0323 by May Shearer RN  Outcome: Ongoing  Goal: Control of acute pain  Description: Control of acute pain  9/17/2021 1018 by Tarik Buenrostro RN  Outcome: Ongoing  9/17/2021 0323 by May Shearer RN  Outcome: Ongoing  Goal: Control of chronic pain  Description: Control of chronic pain  9/17/2021 1018 by Tarik Buenrostro RN  Outcome: Ongoing  9/17/2021 0323 by May Shearer RN  Outcome: Ongoing     Problem: Falls - Risk of:  Goal: Will remain free from falls  Description: Will remain free from falls  9/17/2021 1018 by Tarik Buenrostro RN  Outcome: Ongoing  9/17/2021 0323 by May Shearer RN  Outcome: Ongoing  Goal: Absence of physical injury  Description: Absence of physical injury  9/17/2021 1018 by Tarik Buenrostro RN  Outcome: Ongoing  9/17/2021 0323 by May Shearer RN  Outcome: Ongoing     Problem: Nutrition  Goal: Optimal nutrition therapy  9/17/2021 1018 by Tarik Buenrotsro RN  Outcome: Ongoing  9/17/2021 0323 by May Shearer RN  Outcome: Ongoing

## 2021-09-17 NOTE — PROGRESS NOTES
Pt is dc to go home. Pt take down via wheel chair. Meds sent to pharmacy. Couldn't do meds to bed due to ins issues. PICC removed. [place] in for dc. AVS signed and went over with pt and wife. Home care is set up.

## 2021-09-17 NOTE — PROGRESS NOTES
Infectious Diseases   Progress Note      Admission Date: 9/7/2021  Hospital Day: Hospital Day: 11   Attending: Jesi Peralta MD  Date of service: 9/17/2021     Chief complaint/ Reason for consult:     · Sepsis with tachycardia, tachypnea, hypotension worsening leukocytosis  · Spinal metastatic disease  · Vertebral fracture at T4 level  · Fatty liver disease  · Hyponatremia    Microbiology:        I have reviewed allavailable micro lab data and cultures    · Blood culture (2/2) - collected on 9/7/2021: In process  · Urine culture  - collected on 9/7/2021: In process      Antibiotics and immunizations:       Current antibiotics: All antibiotics and their doses were reviewed by me    Recent Abx Admin                   cefTRIAXone (ROCEPHIN) 2000 mg IVPB in D5W 50ml minibag (mg) 2,000 mg New Bag 09/17/21 1132     2,000 mg New Bag 09/16/21 1745    metronidazole (FLAGYL) 500 mg in NaCl 100 mL IVPB premix (mg) 500 mg New Bag 09/17/21 0630     500 mg New Bag 09/16/21 2153     500 mg New Bag  1429    ceFAZolin (ANCEF) 2,000 mg in dextrose 5 % 50 mL IVPB (mini-bag) (mg) 2,000 mg New Bag 09/16/21 1325                  Immunization History: All immunization history was reviewed by me today. Immunization History   Administered Date(s) Administered    Tdap (Boostrix, Adacel) 09/16/2019       Known drug allergies: All allergies were reviewed and updated    Allergies   Allergen Reactions    Fish Oil Anaphylaxis    Shellfish Allergy Anaphylaxis    Lisinopril      Other reaction(s): Cough       Social history:     Social History:  All social andepidemiologic history was reviewed and updated by me today as needed. · Tobacco use:   reports that he has never smoked. He has never used smokeless tobacco.  · Alcohol use:   reports no history of alcohol use. · Currently lives in: [address]  ·  reports no history of drug use.      COVID VACCINATION AND LAB RESULT RECORDS:     Internal Administration   First Dose      Second Dose           Last COVID Lab SARS-CoV-2 (no units)   Date Value   04/08/2021 Not Detected            Assessment:     The patient is a 67 y.o. old male who  has a past medical history of AAA (abdominal aortic aneurysm) ([facility] [address] CAD (coronary artery disease), Cancer ([facility] [address] Hyperlipidemia, Hypertension, and Nasal bleeding. with following problems:    · Sepsis with tachycardia, tachypnea, hypotension, leukocytosis-this is resolved  · Spinal metastatic disease--ongoing  · Vertebral fracture at T4 level-s/p kyphoplasty on 9/16/2021  · Fatty liver disease  · Hyponatremia -corrected  · Coronary artery disease-stable  · Essential hypertension-blood pressure is okay  · Abdominal aortic aneurysm  · Hyperlipidemia      Discussion:      The patient is afebrile. He remains on IV ceftriaxone and oral Flagyl. He is tolerating antibiotics okay. Serum creatinine 0.7. He has undergone a kyphoplasty yesterday    White cell count is 12,800. Slight elevation in white cell count is likely postprocedural        Plan:     Diagnostic Workup:      · Continue to follow  fever curve, WBC count and blood cultures. · Continue to monitor blood counts, liver and renal function. Antimicrobials:    · Plan to stop IV ceftriaxone at discharge   · Will plan to stop oral Flagyl at discharge  · I do not think you will need any antibiotics at discharge  · Pain control  · Continue close vitals monitoring. · Maintain good glycemic control. · Fall precautions. Aspiration precautions. · Continue to watch for new fever or diarrhea. · DVT prophylaxis. · Discussed all above with patient and RN. · Discussed with Dr. Raúl Powell and Dr. Ryan Lee         I/v access Management:    · Continue to monitor i.v access sites for erythema, induration, discharge or tenderness. · As always, continue efforts to minimize tubes/lines/drains as clinically appropriate to reduce chances of line associated infections.     Patient education and counseling:        · The patient was educated in detail about the side-effects of various antibiotics and things to watch for like new rashes, lip swelling, severe reaction, worsening diarrhea, break through fever etc.  · Discussed patient's condition and what to expect. All of the patient's questions were addressed in a satisfactory manner and patient verbalized understanding all instructions. TIME SPENT TODAY:     - Spent over  26 minutes on visit (including interval history, physical exam, review of data including labs, cultures, imaging, development and implementation of treatment plan and coordination of complex care). More than 50 percent of this includes face-to-face time spent with the patient for counseling and coordination of care. Thanks for allowing me to participate in your patient's care. I will sign off today, but will be available to answer any further questions or concerns that may arise during patient's stay in the hospital.        Subjective: Interval history: Interval history was obtained from chart review and patient/ RN. He is afebrile. He has undergone kyphoplasty yesterday. He is tolerating antibiotics okay     REVIEW OF SYSTEMS:     Review of Systems   Constitutional: Negative for chills, diaphoresis and fever. HENT: Negative for ear discharge, ear pain, rhinorrhea, sore throat and trouble swallowing. Eyes: Negative for discharge and redness. Respiratory: Negative for cough, shortness of breath and wheezing. Cardiovascular: Negative for chest pain and leg swelling. Gastrointestinal: Negative for abdominal pain, constipation, diarrhea and nausea. Endocrine: Negative for polyuria. Genitourinary: Negative for dysuria, flank pain, frequency, hematuria and urgency. Musculoskeletal: Negative for back pain and myalgias. Skin: Negative for rash. Neurological: Negative for dizziness, seizures and headaches. Hematological: Does not bruise/bleed easily. Psychiatric/Behavioral: Negative for hallucinations and suicidal ideas. All other systems reviewed and are negative. Past Medical History: All past medical history reviewed today. Past Medical History:   Diagnosis Date    AAA [facility]abdominal aortic aneurysm) ([facility] [address]     CAD (coronary artery disease)     stents 2017    Cancer ([facility] [address]     Hyperlipidemia     Hypertension     Nasal bleeding     worse since covid making the mask mandatory       Past Surgical History: All past surgical history was reviewed today. Past Surgical History:   Procedure Laterality Date    CARDIAC SURGERY  2017    stents placed     COLONOSCOPY      IR KYPHOPLASTY THORACIC FIRST LEVEL  9/16/2021    IR KYPHOPLASTY THORACIC FIRST LEVEL 9/16/2021 [facility] PROCED[facility]    SINUS ENDOSCOPY N/A 11/2/2020    FUNCTIONAL ENDOSCOPIC SINUS SURGERY performed by Ana Sanchez MD at [address] [place] [address] 4/12/2021    RIGHT FUNCTIONAL ENDOSCOPIC SINUS SURGERY ([id number], [address] [place] [address], [facility]) performed by Ana Sanchez MD at [place] [address] 9/8/2021    EGD GASTRIC BIOPSY performed by Zack Dyer MD at [address] [place] [address]       Family History: All family history was reviewed today. Problem Relation Age of Onset    Other Brother         [email or web address]       Objective:       PHYSICAL EXAM:      Vitals:   Vitals:    09/17/21 0005 09/17/21 0400 09/17/21 0922 09/17/21 1130   BP: (!) 146/77 (!) 145/70 (!) 153/77 133/88   Pulse:  67 80 94   Resp:  18 16 16   Temp:  98.3 °F (36.8 °C) 98.2 °F (36.8 °C) 98.1 °F (36.7 °C)   TempSrc:  Oral Oral Oral   SpO2:  93% 94% 98%   Weight:       Height:           Physical Exam  Vitals and nursing note reviewed. Constitutional:       Appearance: Normal appearance. He is well-developed. HENT:      Head: Normocephalic and atraumatic. Right Ear: External ear normal.      Left Ear: External ear normal.      Nose: Nose normal. No congestion or rhinorrhea. Mouth/Throat:      Mouth: Mucous membranes are moist.      Pharynx: No oropharyngeal exudate or posterior oropharyngeal erythema. Eyes:      General: No scleral icterus. Right eye: No discharge. Left eye: No discharge. Conjunctiva/sclera: Conjunctivae normal.      Pupils: Pupils are equal, round, and reactive to light. Cardiovascular:      Rate and Rhythm: Normal rate and regular rhythm. Pulses: Normal pulses. Heart sounds: No murmur heard. No friction rub. Pulmonary:      Effort: Pulmonary effort is normal. No respiratory distress. Breath sounds: Normal breath sounds. No stridor. No wheezing, rhonchi or rales. Abdominal:      General: Bowel sounds are normal.      Palpations: Abdomen is soft. Tenderness: There is no abdominal tenderness. There is no right CVA tenderness, left CVA tenderness, guarding or rebound. Musculoskeletal:         General: No swelling or tenderness. Normal range of motion. Cervical back: Normal range of motion and neck supple. No rigidity. No muscular tenderness. Lymphadenopathy:      Cervical: No cervical adenopathy. Skin:     General: Skin is warm and dry. Coloration: Skin is not jaundiced. Findings: No erythema or rash. Neurological:      General: No focal deficit present. Mental Status: He is alert and oriented to person, place, and time. Mental status is at baseline. Motor: No abnormal muscle tone. Psychiatric:         Mood and Affect: Mood normal.         Behavior: Behavior normal.         Thought Content: Thought content normal.               Lines and drains: All vascular access sites are healthy with no local erythema, discharge or tenderness. Intake and output:    I/O last 3 completed shifts: In: 9490 [P.O.:670; I.V.:1620]  Out: 4000 [Urine:4000]    Lab Data:   All available labs and old records have been reviewed by me.     CBC:  Recent Labs     09/15/21  0630 09/16/21  0630 09/17/21  0630 WBC 10.8 11.0 12.8*   RBC 3.19* 3.48* 3.75*   HGB 8.9* 9.7* 10.3*   HCT 26.7* 29.0* 31.4*    351 355   MCV 83.5 83.3 83.8   MCH 27.9 27.7 27.5   MCHC 33.4 33.3 32.9   RDW 13.4 13.4 13.6        BMP:  Recent Labs     09/15/21  0630 09/16/21  0630 09/17/21  0630    137 137   K 3.7 4.1 4.2    103 102   CO2 26 26 24   BUN 10 10 11   CREATININE 0.6* 0.6* 0.7*   CALCIUM 8.3 8.6 8.8   GLUCOSE 113* 125* 140*        Hepatic Function Panel:   Lab Results   Component Value Date    ALKPHOS 128 09/07/2021    ALT 22 09/07/2021    AST 18 09/07/2021    PROT 8.0 09/07/2021    BILITOT 0.6 09/07/2021    LABALBU 4.4 09/07/2021       CPK: No results found for: CKTOTAL  ESR: No results found for: SEDRATE  CRP: No results found for: CRP        Imaging: All pertinent images and reports for the current visit were reviewed by me during this visit. IR KYPHOPLASTY THORACIC 1 VERTEBRAL BODY   Final Result   Successful biopsy, radiofrequency ablation and subsequent kyphoplasty of T4   vertebra with fluoroscopic guidance. IR INPATIENT CONSULT 20 MIN (FOR RADIOLOGIST USE ONLY)   Final Result      MRI THORACIC SPINE W WO CONTRAST   Final Result   Compression fracture with. There is enhancement of the pedicles bilaterally   as well as adjacent vertebral body. There is some epidural enhancement and   paraspinous enhancement adjacent to the T4 vertebral body. No disc   enhancement. Possibility of metastatic disease cannot be excluded. Edema noted in the interspinous region at T3-4 and T4-5. Possibility of   ligamentous injury the interspinous region cannot be excluded. No evidence   for disruption anterior posterior longitudinal ligament.          IR INPATIENT CONSULT 20 MIN (FOR RADIOLOGIST USE ONLY)   Final Result      IR INPATIENT CONSULT 40 MIN (FOR RADIOLOGIST USE ONLY)   Final Result      CT ABDOMEN PELVIS WO CONTRAST Additional Contrast? Oral   Final Result   Bilateral hydroureteronephrosis, greater on the left. There is a partially   obstructing 3 mm stone in the mid left ureter. There is also new left   perinephric and ureteric edema, suggesting component of obstruction due to   stone. Hinton catheter is in normal position. Urinary bladder is partially   decompressed. Etiology of dilation of the right collecting system and lower   left collecting system is uncertain. Correlation for optimized function of   Hinton catheter is suggested. Mild dilation of mid to lower small bowel loops in the pelvis. There is no   discrete area of transition. Generalized or focal ileus is favored over   obstruction. Small bilateral pleural effusions increased in the interval.  Bibasilar   atelectasis. Stable lytic lesion in the L4 vertebral body. XR ABDOMEN (2 VIEWS)   Final Result   Mild generalized ileus pattern. Low colonic obstruction is a less likely   differential possibility. Mild prominence of stool in the ascending colon. Possible distention of the urinary bladder. Clinical correlation is   suggested. If necessary, follow-up ultrasound may be obtained. CT SOFT TISSUE NECK WO CONTRAST   Final Result   Chronic or acute on chronic pansinusitis. The distribution is similar to the   CT PET of 05/17/2021. Superimposed acute components of inflammation may be   present, similar to the previous study. Nodular soft tissue density in the   right nasal passage is slightly decreased. No adenopathy or acute inflammatory abnormality is otherwise identified. CT HEAD WO CONTRAST   Final Result   1. No evidence of acute intracranial abnormality. 2. Paranasal sinus disease in this patient status post interval right-sided   paranasal sinus surgery when compared to 10/16/2020.   3. Interval development of bilateral mastoid sinus disease with new interval   opacification of middle ear cavities bilaterally.          US GALLBLADDER RUQ   Final Result   Liver is heterogeneous in appearance and increased in echogenicity compatible   with diffuse hepatocellular disease, hepatic steatosis. Subtle findings of   the liver are limited due to body habitus. Please refer to CT performed same   day for further evaluation. The gallbladder appears unremarkable in appearance. CT THORACIC SPINE TRAUMA RECONSTRUCTION   Final Result   Findings consistent with metastatic disease and pathologic fracture at T4. The patient is likely a candidate for kyphoplasty and radiofrequency   ablation. Interventional radiology consult is suggested. Lucent lesions at T9 and L4 most likely represent Schmorl's node deformities,   however metastatic disease is difficult to exclude. CT LUMBAR SPINE WO CONTRAST   Final Result   Findings consistent with metastatic disease and pathologic fracture at T4. The patient is likely a candidate for kyphoplasty and radiofrequency   ablation. Interventional radiology consult is suggested. Lucent lesions at T9 and L4 most likely represent Schmorl's node deformities,   however metastatic disease is difficult to exclude. CTA Chest W WO Contrast   Final Result   1. No acute intrathoracic abnormality. 2. No acute intra-abdominal abnormality. CT ABDOMEN PELVIS W IV CONTRAST Additional Contrast? None   Final Result   1. No acute intrathoracic abnormality. 2. No acute intra-abdominal abnormality. Medications: All current and past medications were reviewed.      tamsulosin  0.4 mg Oral BID    finasteride  5 mg Oral Daily    polyethylene glycol  17 g Oral Daily    pantoprazole  40 mg IntraVENous BID    cefTRIAXone (ROCEPHIN) IV  2,000 mg IntraVENous Q24H    naloxegol  25 mg Oral QAM    lidocaine 1 % injection  5 mL IntraDERmal Once    sodium chloride flush  5-40 mL IntraVENous 2 times per day    atorvastatin  40 mg Oral Nightly    valsartan  80 mg Oral Daily    sodium chloride flush  5-40 mL IntraVENous 2 times per day    metroNIDAZOLE  500 mg IntraVENous Q8H    lidocaine  1 patch TransDERmal Daily        sodium chloride      sodium chloride      sodium chloride 75 mL/hr at 09/17/21 0730       morphine **OR** morphine, acetaminophen, sodium chloride flush, sodium chloride, promethazine, sodium chloride, melatonin, ondansetron, sodium chloride flush, sodium chloride, [DISCONTINUED] acetaminophen **OR** acetaminophen, bisacodyl      Problem list:       Patient Active Problem List   Diagnosis Code    Subacute pansinusitis J01.40    Nasal polyposis J33.9    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 Although every effort was made to ensure the accuracy of this automated transcription, some errors in transcription may have occurred inadvertently. If you may need any clarification, please do not hesitate to contact me through EPIC or at the phone number provided below with my electronic signature. Any pictures or media included in this note were obtained after taking informed verbal consent from the patient and with their approval to include those in the patient's medical record.     Oriana Auguste MD, MPH  9/17/2021 , 12:15 PM   [facility] Infectious Disease   [address] [place] [address], [address]  Office: [phone]  Fax: [phone]  Clinic days:  Tuesday & Thursday

## 2021-09-17 NOTE — PROGRESS NOTES
Retention issues. RN replaced Jamaal WISEMAN aware. Per urology was anticipating will need it again per their note.

## 2021-09-17 NOTE — CARE COORDINATION
Discharge Note:    Pt is DC home with [address] with Ritika Evangelista made aware, bedside RN made aware.     Praveen Mosqueda RN Case Manager

## 2021-09-17 NOTE — DISCHARGE SUMMARY
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 Hospital Course:     67 y. o. 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  Patient states that the pain has been there for the past several weeks.  Has been addressed with PMD who was concerned for cardiac etiology.  Patient was seen at [facility] on 8/31/2021 at which time he was evaluated with EKG troponins CT chest and nuclear stress test which did not reveal etiology.  Patient was also supposed to get gallbladder ultrasound to rule out biliary etiology of this pain however opted to do it outpatient. Zelalem Diaz presents today to [facility] ER again with intractable back pain.  Muscle relaxants prescribed at [facility] are not helping.  Per patient spouse have all supply of pain medications from back when he had surgeries and has been managing with taking 1 pill a day.  At the ED today CT chest shows metastatic disease and pathologic fracture of T4.  Lucent lesions at T9 and L4 are also noted.  Patient with marked leukocytosis of 21,000 with elevated lactate at 4.1 with resulting metabolic acidosis with anion gap with respiratory compensation. Intractable mid upper back pain  Secondary to T4 pathologic fracture with metastatic disease  Pain control  IR consulted status post kyphoplasty and biopsy of the lesion at T4  We will follow-up with oncology for biopsy results     Hematemesis  Resolved  S/p  EGD patchy gastritis coffee-ground material, epistaxis?     Urinary retention  Bilateral hydronephrosis  Small renal calculus  Continue following  Urology input appreciated  Voiding after catheter removal  We will follow up with urology     Severe sepsis with lactic acidosisresolved  Most likely source upper respiratory infection  Hospitalist broad-spectrum antibiotics including anaerobic coverage \  ID input appreciated     Anion gap acidosis  Likely secondary to elevated lactic acid  Resolved     Accelerated hypertensionimproved in view of pain  Continue home dose of valsartan  IV hydralazine as needed     History of coronary heart disease  Recent stress test showed no ischemia  Unable to take antiplatelets in view of epistaxis     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 HEENT:  Normal cephalic, atraumatic without obvious deformity. Pupils equal, round, and reactive to light. Extra ocular muscles intact. Conjunctivae/corneas clear. Neck: Supple, with full range of motion. No jugular venous distention. Trachea midline.   Respiratory:  Normal respiratory effort. Clear to auscultation, bilaterally without Rales/Wheezes/Rhonchi. Cardiovascular:  Regular rate and rhythm with normal S1/S2 without murmurs, rubs or gallops. Abdomen: Soft, non-tender, non-distended with normal bowel sounds. Musculoskeletal:  No clubbing, cyanosis or edema bilaterally. Full range of motion without deformity. Skin: Skin color, texture, turgor normal.  No rashes or lesions. Neurologic:  Neurovascularly intact without any focal sensory/motor deficits. Cranial nerves: II-XII intact, grossly non-focal.  Psychiatric:  Alert and oriented, thought content appropriate, normal insight  Capillary Refill: Brisk,< 3 seconds   Peripheral Pulses: +2 palpable, equal bilaterally       Labs: For convenience and continuity at follow-up the following most recent labs are provided:      CBC:    Lab Results   Component Value Date    WBC 12.8 09/17/2021    HGB 10.3 09/17/2021    HCT 31.4 09/17/2021     09/17/2021       Renal:    Lab Results   Component Value Date     09/17/2021    K 4.2 09/17/2021    K 4.2 09/07/2021     09/17/2021    CO2 24 09/17/2021    BUN 11 09/17/2021    CREATININE 0.7 09/17/2021    CREATININE 0.9 07/02/2020    CALCIUM 8.8 09/17/2021    PHOS 3.3 09/17/2021         Significant Diagnostic Studies    Radiology:   IR KYPHOPLASTY THORACIC 1 VERTEBRAL BODY   Final Result   Successful biopsy, radiofrequency ablation and subsequent kyphoplasty of T4   vertebra with fluoroscopic guidance. IR INPATIENT CONSULT 20 MIN (FOR RADIOLOGIST USE ONLY)   Final Result      MRI THORACIC SPINE W WO CONTRAST   Final Result   Compression fracture with. There is enhancement of the pedicles bilaterally   as well as adjacent vertebral body. There is some epidural enhancement and   paraspinous enhancement adjacent to the T4 vertebral body. No disc   enhancement. Possibility of metastatic disease cannot be excluded.       Edema noted in the interspinous region at T3-4 and T4-5.  Possibility of   ligamentous injury the interspinous region cannot be excluded. No evidence   for disruption anterior posterior longitudinal ligament. IR INPATIENT CONSULT 20 MIN (FOR RADIOLOGIST USE ONLY)   Final Result      IR INPATIENT CONSULT 40 MIN (FOR RADIOLOGIST USE ONLY)   Final Result      CT ABDOMEN PELVIS WO CONTRAST Additional Contrast? Oral   Final Result   Bilateral hydroureteronephrosis, greater on the left. There is a partially   obstructing 3 mm stone in the mid left ureter. There is also new left   perinephric and ureteric edema, suggesting component of obstruction due to   stone. Hinton catheter is in normal position. Urinary bladder is partially   decompressed. Etiology of dilation of the right collecting system and lower   left collecting system is uncertain. Correlation for optimized function of   Hinton catheter is suggested. Mild dilation of mid to lower small bowel loops in the pelvis. There is no   discrete area of transition. Generalized or focal ileus is favored over   obstruction. Small bilateral pleural effusions increased in the interval.  Bibasilar   atelectasis. Stable lytic lesion in the L4 vertebral body. XR ABDOMEN (2 VIEWS)   Final Result   Mild generalized ileus pattern. Low colonic obstruction is a less likely   differential possibility. Mild prominence of stool in the ascending colon. Possible distention of the urinary bladder. Clinical correlation is   suggested. If necessary, follow-up ultrasound may be obtained. CT SOFT TISSUE NECK WO CONTRAST   Final Result   Chronic or acute on chronic pansinusitis. The distribution is similar to the   CT PET of 05/17/2021. Superimposed acute components of inflammation may be   present, similar to the previous study. Nodular soft tissue density in the   right nasal passage is slightly decreased.       No adenopathy or acute inflammatory abnormality is otherwise identified. CT HEAD WO CONTRAST   Final Result   1. No evidence of acute intracranial abnormality. 2. Paranasal sinus disease in this patient status post interval right-sided   paranasal sinus surgery when compared to 10/16/2020.   3. Interval development of bilateral mastoid sinus disease with new interval   opacification of middle ear cavities bilaterally. US GALLBLADDER RUQ   Final Result   Liver is heterogeneous in appearance and increased in echogenicity compatible   with diffuse hepatocellular disease, hepatic steatosis. Subtle findings of   the liver are limited due to body habitus. Please refer to CT performed same   day for further evaluation. The gallbladder appears unremarkable in appearance. CT THORACIC SPINE TRAUMA RECONSTRUCTION   Final Result   Findings consistent with metastatic disease and pathologic fracture at T4. The patient is likely a candidate for kyphoplasty and radiofrequency   ablation. Interventional radiology consult is suggested. Lucent lesions at T9 and L4 most likely represent Schmorl's node deformities,   however metastatic disease is difficult to exclude. CT LUMBAR SPINE WO CONTRAST   Final Result   Findings consistent with metastatic disease and pathologic fracture at T4. The patient is likely a candidate for kyphoplasty and radiofrequency   ablation. Interventional radiology consult is suggested. Lucent lesions at T9 and L4 most likely represent Schmorl's node deformities,   however metastatic disease is difficult to exclude. CTA Chest W WO Contrast   Final Result   1. No acute intrathoracic abnormality. 2. No acute intra-abdominal abnormality. CT ABDOMEN PELVIS W IV CONTRAST Additional Contrast? None   Final Result   1. No acute intrathoracic abnormality. 2. No acute intra-abdominal abnormality.                 Consults:     PHARMACY TO DOSE VANCOMYCIN  IP CONSULT TO PALLIATIVE CARE  IP CONSULT TO OTOLARYNGOLOGY  IP CONSULT TO INTERVENTIONAL RADIOLOGY  IP CONSULT TO HEM/ONC  IP CONSULT TO GI  IP CONSULT TO INFECTIOUS DISEASES  IP CONSULT TO UROLOGY  IP CONSULT TO SPIRITUAL SERVICES    Disposition: Home    Condition at Discharge: Stable    Discharge Instructions/Follow-up: PCP  Heme-onc  ENT  Urology      Code Status:  Full Code     Activity: activity as tolerated    Diet: regular diet      Discharge Medications:     Current Discharge Medication List           Details   sodium chloride (OCEAN, BABY AYR) 0.65 % nasal spray 1 spray by Nasal route every 6 hours as needed (moisturization)  Qty: 1 each, Refills: 1      finasteride (PROSCAR) 5 MG tablet Take 1 tablet by mouth daily  Qty: 30 tablet, Refills: 3      tamsulosin (FLOMAX) 0.4 MG capsule Take 1 capsule by mouth 2 times daily  Qty: 30 capsule, Refills: 3      oxyCODONE-acetaminophen (PERCOCET)  MG per tablet Take 1 tablet by mouth every 8 hours as needed for Pain for up to 3 days. Intended supply: 30 days  Qty: 9 tablet, Refills: 0    Comments: Reduce doses taken as pain becomes manageable  Associated Diagnoses: Pathologic thoracic fracture, initial encounter              Details   atorvastatin (LIPITOR) 40 MG tablet Take 40 mg by mouth nightly       valsartan (DIOVAN) 80 MG tablet Take 80 mg by mouth daily      montelukast (SINGULAIR) 10 MG tablet TAKE 1 TABLET BY MOUTH EVERY NIGHT  Qty: 90 tablet, Refills: 3    Comments: **Patient requests 90 days supply**  Associated Diagnoses: Cancer of nasal cavity and sinus ([facility] [address] Allergic sinusitis      !! methylPREDNISolone (MEDROL, YANA,) 4 MG tablet Take 1 tablet by mouth See Admin Instructions Take by mouth. Qty: 1 kit, Refills: 0      !! methylPREDNISolone (MEDROL, YANA,) 4 MG tablet Take 1 tablet by mouth See Admin Instructions Take by mouth.   Qty: 1 kit, Refills: 0      Multiple Vitamin (MULTIVITAMIN) tablet Take 1 tablet by mouth daily       Magnesium 400 MG CAPS Take 400 mg by mouth daily       vitamin D-3 (CHOLECALCIFEROL) 125 MCG (5000 UT) TABS Take by mouth      vitamin C (ASCORBIC ACID) 500 MG tablet Take 500 mg by mouth daily        ! ! - Potential duplicate medications found. Please discuss with provider. Time Spent on discharge is more than 30 minutes in the examination, evaluation, counseling and review of medications and discharge plan.       Signed:    Electronically signed by Jhon Tony MD on 9/17/2021 at 1:27 PM

## 2021-09-17 NOTE — PROGRESS NOTES
[facility] home care referral. Spoke with pt and re: home care plan of care/services. Agreeable. Demographic's verified. Aware of discharge with home care. Home care orders sent to [facility]. Discharge planner notified.

## 2021-09-17 NOTE — PROGRESS NOTES
Occupational Therapy  Facility/Department: [address] ORTHO/NEURO NURSING  Daily Treatment Note  NAME: Mariana Devries  : 1945  MRN: [id number]    Date of Service: 2021    Discharge Recommendations: Mariana Devries scored a 21/24 on the AM-PAC ADL Inpatient form. Current research shows that an AM-PAC score of 18 or greater is typically associated with a discharge to the patient's home setting. Based on the patient's AM-PAC score, and their current ADL deficits, it is recommended that the patient have 2-3 sessions per week of Occupational Therapy at d/c to increase the patient's independence. At this time, this patient demonstrates the endurance and safety to discharge home with home OT  and a follow up treatment frequency of 2-3x/wk. Please see assessment section for further patient specific details. HOME HEALTH CARE: LEVEL 1 STANDARD    - Initial home health evaluation to occur within 24-48 hours, in patient home   - Therapy to evaluate with goal of regaining prior level of functioning   - Therapy to evaluate if patient has [address] needs for personal care    If patient discharges prior to next session this note will serve as a discharge summary. Please see below for the latest assessment towards goals. OT Equipment Recommendations  Equipment Needed: No    Assessment   Performance deficits / Impairments: Decreased functional mobility ; Decreased balance;Decreased ADL status; Decreased high-level IADLs;Decreased endurance  Assessment: Patient presents slightly below baseline d/t above deficits; OT indicated to maximize safety/independence with ADL and IADL  Treatment Diagnosis: Above deficits associated with septicemia  Prognosis: Good  OT Education: OT Role;Plan of Care  Patient Education: Functional mobility, ADLs-- patinet verbalized understanding  Barriers to Learning: Hearing  REQUIRES OT FOLLOW UP: Yes  Activity Tolerance  Activity Tolerance: Patient Tolerated treatment well  Activity Tolerance: Patient reports feeling much better after kyphoplasty on 9/16/21  Safety Devices  Type of devices: All fall risk precautions in place;Nurse notified;Call light within reach; Chair alarm in place; Left in chair (called nurse asking for pain meds.)  Restraints  Initially in place: No         Patient Diagnosis(es): The primary encounter diagnosis was Pathologic thoracic fracture, initial encounter. Diagnoses of Septicemia ([facility] [address] and Hypertensive urgency were also pertinent to this visit. has a past medical history of AAA (abdominal aortic aneurysm) ([facility] [address] [facility] CAD (coronary artery disease), Cancer ([facility] [address] Hyperlipidemia, Hypertension, and Nasal bleeding. has a past surgical history that includes Cardiac surgery (2017); Colonoscopy; Sinus endoscopy (N/A, 11/2/2020); Sinus endoscopy (Right, 4/12/2021); Upper gastrointestinal endoscopy (N/A, 9/8/2021); and IR KYPHOPLASTY THORACIC 1 VERTEBRAL BODY (9/16/2021). Restrictions  Restrictions/Precautions  Restrictions/Precautions: Fall Risk, Modified Diet, Surgical Protocols  Required Braces or Orthoses?: No  Position Activity Restriction  Spinal Precautions: S/p T4 kyphoplasty on 9/16/21  Other position/activity restrictions: 67 y.o. 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 Patient states that the pain has been there for the past several weeks. Has been addressed with PMD who was concerned for cardiac etiology. Patient was seen at [address] [place] [address] on 8/31/2021 at which time he was evaluated with EKG troponins CT chest and nuclear stress test which did not reveal etiology. Patient was also supposed to get gallbladder ultrasound to rule out biliary etiology of this pain however opted to do it outpatient.   Patient presents today to [facility] ER again with intractable back pain. Muscle relaxants prescribed at [facility] are not helping. Per patient spouse have all supply of pain medications from back when he had surgeries and has been managing with taking 1 pill a day. At the ED today CT chest shows metastatic disease and pathologic fracture of T4. Lucent lesions at T9 and L4 are also noted. Patient with marked leukocytosis of 21,000 with elevated lactate at 4.1 with resulting metabolic acidosis with anion gap with respiratory compensation. Hospitalist consulted for admission. Subjective   General  Chart Reviewed: Yes  Response to previous treatment: Patient with no complaints from previous session  Family / Caregiver Present: No  Diagnosis: Septicemia  Subjective  Subjective: Patient supine in bed, reports no pain at rest. Hoping to go home today. General Comment  Comments: T4 kyphoplasty performed on 9/16/21. No pain at rest, 5-6 pain at end of session-- called nurse for pain meds, placed warm blanket across shoulders when sitting in chair  Pain Assessment  Pain Level: 6  Pain Type: Surgical pain  Pain Location: Back  Pain Orientation: Upper  Pain Descriptors: Throbbing  Pain Frequency: Intermittent  Functional Pain Assessment: Activities are not prevented  Vital Signs  Patient Currently in Pain: Yes   Orientation  Orientation  Overall Orientation Status: Within Normal Limits  Objective    ADL  Feeding: Independent  Grooming: Independent  UE Bathing: Setup  LE Bathing: Minimal assistance  UE Dressing: Setup  LE Dressing: Setup;Supervision  Toileting: Supervision  Additional Comments: Ambulated with CGA and RW into bathroom. Performed sponge bathing, new gown change, donned pullup brief sitting on commode. Washed hands at sink.         Balance  Sitting Balance: Independent  Standing Balance: Stand by assistance  Standing Balance  Time: @ 10 minutes  Activity: Toileting, grooming at sink ambulation x 300 feet with RW and SBA  Functional Mobility  Functional - Mobility Device: Rolling Walker  Activity: To/from bathroom  Assist Level: Stand by assistance  Functional Mobility Comments: Patient reports feeling better getting up and moving this date. No LOB  Toilet Transfers  Toilet - Technique: Ambulating  Equipment Used: Standard toilet  Toilet Transfer: Supervision  Bed mobility  Supine to Sit: Independent  Scooting: Independent                          Cognition  Overall Cognitive Status: WFL     Perception  Overall Perceptual Status: WFL                                   Plan   Plan  Times per week: 3-5  Times per day: Daily  Current Treatment Recommendations: Strengthening, Balance Training, Functional Mobility Training, Safety Education & Training, Home Management Training, Equipment Evaluation, Education, & procurement, Self-Care / ADL, Patient/Caregiver Education & Training  G-Code     OutComes Score                                                  AM-PAC Score        AM-PAC Inpatient Daily Activity Raw Score: 21 (09/17/21 1123)  AM-PAC Inpatient ADL T-Scale Score : 44.27 (09/17/21 1123)  ADL Inpatient CMS 0-100% Score: 32.79 (09/17/21 1123)  ADL Inpatient CMS G-Code Modifier : Kevan Epstein (09/17/21 1123)    Goals  Short term goals  Time Frame for Short term goals: discharge  Short term goal 1: UB ADL mod I-- setup up 9/17/21  Short term goal 2: LB ADL mod I-- CGA 9/17/21  Short term goal 3: Fxl transfers mod I-- supervision with RW 9/17/21  Short term goal 4: Fxl mob mod I-- supervision 9/17/21  Short term goal 5:  Toileting mod I-- supervision 9/17/21  Long term goals  Time Frame for Long term goals : LTG=STG       Therapy Time   Individual Concurrent Group Co-treatment   Time In 1057         Time Out 1123         Minutes 26              Timed Code Treatment Minutes:  26 Minutes    Total Treatment Minutes:  [address], OTR/L [address]

## 2021-09-17 NOTE — PROGRESS NOTES
Urology Progress Note  [facility]    Provider: PRANAV Rios CNP  Patient ID:  Admission Date: 2021 Name: Efren Atkins Date: 2021 MRN: [id number]   Patient Location: [id number] : 1945  Attending: Jessie Nguyen MD Date of Service: 2021  PCP: Varghese Narayan MD     Diagnoses:  Acute Urinary Retention    Assessment/Plan:  66 yo male admitted with HTN and intractable back pain 2/2 to T4 pathologic fracture with metastatic disease. No hx of dysuria, or BPH with LUTS, not taking prostate medications at home, has never seen a urologist in the past. Admitted 2021 but began to have increasing suprapubic pain 2021. PVR was obtained showing bladder distension and he was catheterized for over 1000cc's. CT a/p 2021 shows Left >Right hydronephrosis and a partially obstructing mid left ureteral stone measuring 3mm. No leukocytosis, Ua appears sterile, afebrile. Denies left abdominal or flank pain. No left CVAT. Recc  -Continue Flomax BID and finasteride outpatient  -s/p kyphoplasty yesterday  -VT this morning, obtain PVR after voiding spontaneously, will need to replace florentino for PVR >400cc's and f/u in office  -He will follow up to discuss incomplete bladder emptying and repeat imaging for left ureteral stone- requested    The patient had a chance to ask questions which were answered. he understands the above plan. Subjective: Matteo Franklin is a 67 y.o. male. He was seen and examined this morning. Today we discussed retention and removing florentino tomorrow or the next. Discussed having to replace florentino if still having trouble. Discussed f/u in the office and likely need for cystoscopy to evaluate for obstruction.       Objective:   Vitals:  Vitals:    21 0400   BP: (!) 145/70   Pulse: 67   Resp: 18   Temp: 98.3 °F (36.8 °C)   SpO2: 93%       Intake/Output Summary (Last 24 hours) at 2021 0915  Last data filed at 2021

## 2021-09-20 NOTE — TELEPHONE ENCOUNTER
Spoke to Brittany([facility]), she states patient was recently released from hospital and Dr Hand Later is listed as the ordering provider.

## 2021-09-20 NOTE — TELEPHONE ENCOUNTER
Representative from [facility], calling to confirm provider would sign orders for home care and ot/pt. She was also wanting to confirm a couple medications for the patient. Please call and advise.

## 2021-09-23 NOTE — TELEPHONE ENCOUNTER
Pt. Wife is calling asking for the results from his lesion she is asking for a call back and does he need to make another apt.      Office visit 8/23

## 2021-09-23 NOTE — TELEPHONE ENCOUNTER
Patients wife called by way of the [facility]-- stating patient was in the hospital at this time due to cardiac issues. Stated she wanted to follow up with the clinical staff regarding advise on what treatment steps they should be seeking.

## 2021-09-24 NOTE — PROGRESS NOTES
Physician Progress Note      PATIENT:               Vladimir Giordano  [facility] #:                  [id number]  :                       1945  ADMIT DATE:       2021 11:47 AM  DISCH DATE:        2021 3:56 PM  RESPONDING  PROVIDER #:        Omar Lazar MD          QUERY TEXT:    Patient admitted with Hematemesis/coffee ground emesis. Noted documentation of   Mild Erosive Esophagitis in EGD on 2021. In order to support the   diagnosis of Erosive Esophagitis with bleeding, please include additional   clinical indicators in your documentation, or please document if the diagnosis   of Erosive Esophagitis with bleeding has been ruled out after further study. The medical record reflects the following:  Risk Factors: Patient presented with anemia and  Hematemesis with coffee   ground emesis  Clinical Indicators: EGD 2021 showed mild erosive esophagitis hiatal   hernia and patchy gastritis. ? swallowed blood from epistaxis? No recent   epistaxis. Treatment: GI consult; EGD; IV PPI  Options provided:  -- Mild Erosive ulcer of Esophagus present as evidenced by, Please document   evidence and EGD findings. -- Mild Erosive ulcer of Esophagus was ruled out as source of bleeding  -- Other - I will add my own diagnosis  -- Disagree - Not applicable / Not valid  -- Disagree - Clinically unable to determine / Unknown  -- Refer to Clinical Documentation Reviewer    PROVIDER RESPONSE TEXT:    Provider is clinically unable to determine a response to this query.     Query created by: Asif Santos on 2021 8:10 AM      Electronically signed by:  Omar Lazar MD 2021 8:17 AM

## 2021-09-24 NOTE — TELEPHONE ENCOUNTER
Spoke to patient's wife, scheduled follow up visit for 10/5/21. Patient's wife will drop off paperwork that needs filled out for insurance.

## 2021-09-28 NOTE — TELEPHONE ENCOUNTER
Completed form based on information in [facility] from hospitalization records. Needs MD signature. Please scan completed form to chart and call patient's wife to .  Also OK to fax to claim fax # [phone]

## 2021-10-04 NOTE — PROGRESS NOTES
[address] VISIT      Patient Name: Diana Kaiser  Medical Record Number:  [id number]  Primary Care Physician:  Michelle Hein MD    ChiefComplaint     Chief Complaint   Patient presents with    Sinus Problem     recheck nasal cavity, using sinus rinse therapy with antibiotic/steroid irrigation. History of Present Illness     Diana Kaiser is an 67 y.o. male previously seen for right sinonasal squamous cell carcinoma. Status post IMRT radiation completed July 14 2021. Functional endoscopic sinus surgery bilateral with primarily right side, on the left side it was anterior ethmoid and maxillary sinus, on the right side it was anterior and posterior ethmoidectomies, sphenoid sinusotomies, maxillary antrostomy, frontal sinus ostium enlargement and removal of large polypoid tissue by Dr. Margaret Contreras on 11/2/2020.     Right-sided functional endoscopic sinus surgery including removal of tumor from anterior and posterior ethmoids, sphenoid sinus opening, and maxillary sinus by Dr. Margaret Contreras on 4/12/2021. Interval History:   Back pain is somewhat improved after undergoing kyphoplasty. He has been utilizing medicated sinonasal saline irrigations up until yesterday. No nosebleeds. No more emesis or bloody bowel movements. Dr. Lissette Hackett is requesting PET scan. Hearing test at [facility] on Oct 15th. Feels like his hearing is gotten worse recently, especially on the right.     Past Medical History     Past Medical History:   Diagnosis Date    AAA [facility]abdominal aortic aneurysm) [facility] [place] [address]     CAD (coronary artery disease)     stents 2017    Cancer ([facility] [place] [address] [facility]     Hyperlipidemia     Hypertension     Nasal bleeding     worse since covid making the mask mandatory       Past Surgical History     Past Surgical History:   Procedure Laterality Date    CARDIAC SURGERY  2017    stents placed     COLONOSCOPY      IR KYPHOPLASTY THORACIC FIRST LEVEL  9/16/2021 IR KYPHOPLASTY THORACIC FIRST LEVEL 9/16/2021 [facility] SPECIAL PROCEDURES    SINUS ENDOSCOPY N/A 11/2/2020    FUNCTIONAL ENDOSCOPIC SINUS SURGERY performed by Andrew Maurer MD at [address] [place] [address] Right 4/12/2021    RIGHT FUNCTIONAL ENDOSCOPIC SINUS SURGERY (24805, 10917, [address]126) performed by Andrew [facility]urer MD at [facility] N/A 9/8/2021    EGD GASTRIC BIOPSY performed by Erik Romero MD at [address] ENDOSCOPY       Family History     Family History   Problem Relation Age of Onset    Other Brother         anurysim       Social History     Social History     Tobacco Use    Smoking status: Never Smoker    Smokeless tobacco: Never Used   Vaping Use    Vaping Use: Never used   Substance Use Topics    Alcohol use: Never    Drug use: Never        Allergies     Allergies   Allergen Reactions    Fish Oil Anaphylaxis    Shellfish Allergy Anaphylaxis    Lisinopril      Other reaction(s): Cough       Medications     Current Outpatient Medications   Medication Sig Dispense Refill    oxyCODONE-acetaminophen (PERCOCET)  MG per tablet 1 tablet: TAKE 1 TABLET EVERY 8 HOURS AS NEEDED FOR PAIN      sodium chloride (OCEAN, BABY AYR) 0.65 % nasal spray 1 spray by Nasal route every 6 hours as needed (moisturization) 1 each 1    finasteride (PROSCAR) 5 MG tablet Take 1 tablet by mouth daily 30 tablet 3    tamsulosin (FLOMAX) 0.4 MG capsule Take 1 capsule by mouth 2 times daily 30 capsule 3    montelukast (SINGULAIR) 10 MG tablet TAKE 1 TABLET BY MOUTH EVERY NIGHT 90 tablet 3    atorvastatin (LIPITOR) 40 MG tablet Take 40 mg by mouth nightly       valsartan (DIOVAN) 80 MG tablet Take 80 mg by mouth daily      Multiple Vitamin (MULTIVITAMIN) tablet Take 1 tablet by mouth daily       Magnesium 400 MG CAPS Take 400 mg by mouth daily       vitamin D-3 (CHOLECALCIFEROL) 125 MCG (5000 UT) TABS Take by mouth      vitamin C (ASCORBIC ACID) 500 MG tablet Take 500 mg by mouth daily       methylPREDNISolone (MEDROL, YANA,) 4 MG tablet Take 1 tablet by mouth See Admin Instructions Take by mouth. (Patient not taking: Reported on 6/1/2021) 1 kit 0    methylPREDNISolone (MEDROL, YANA,) 4 MG tablet Take 1 tablet by mouth See Admin Instructions Take by mouth. (Patient not taking: Reported on 5/25/2021) 1 kit 0     No current facility-administered medications for this visit. Review of Systems     REVIEW OF SYSTEMS  The following systems were reviewed and revealed the following in addition to any already discussed in the HPI:    CONSTITUTIONAL: no weight loss, no fever, no night sweats, no chills  EYES: no vision changes, no blurry vision  EARS: no hearing loss, no otalgia  NOSE: no epistaxis, no rhinorrhea  THROAT: No voice changes, no sore throat, no dysphagia    PhysicalExam     Vitals:    10/04/21 1309   BP: 113/73   Site: Left Upper Arm   Position: Sitting   Cuff Size: Medium Adult   Pulse: 68   Temp: 98.9 °F (37.2 °C)   TempSrc: Temporal   Weight: 173 lb (78.5 kg)       PHYSICAL EXAM  /73 (Site: Left Upper Arm, Position: Sitting, Cuff Size: Medium Adult)   Pulse 68   Temp 98.9 °F (37.2 °C) (Temporal)   Wt 173 lb (78.5 kg)   BMI 25.55 kg/m²     GENERAL: No acute distress, alert and oriented, no hoarseness  EYES: EOMI, Anti-icteric  NOSE: On anterior rhinoscopy there is crusting within the right nasal passage. See procedure note below. EARS: Normal external appearance; on portable otomicroscopy:     -Ad: External auditory canal without stenosis, tympanic membrane thickened, unable to visualize middle ear space. Tympanic membrane with decreased mobility on pneumatic otoscopy.      -As: External auditory canal without stenosis, tympanic membrane clear, no middle ear effusions or retractions  FACE: HB 1/6 bilaterally, symmetric appearing, sensation equal bilaterally  ORAL CAVITY: No masses or lesions visualized or palpated, uvula is midline, moist mucous membranes  NECK: Normal range of motion, no thyromegaly, trachea is midline, no palpable lymphadenopathy or neck masses, no crepitus  CHEST: Normal respiratory effort, breathing comfortably, no retractions  SKIN: No rashes, normal appearing skin, no evidence of skin lesions/tumors  NEURO: Cranial Nerves 2, 3, 4, 5, 6, 7, 11, 12 intact bilaterally     I have performed a head and neck physical exam personally or was physically present during the key or critical portions of the service. Procedure     Nasal Endoscopy, Diagnostic (55341)     Pre-op: Squamous cell carcinoma nasal cavity and sinus  Post-op: Same  Procedure: Nasal endoscopy     After obtaining verbal consent from the patient 2% lidocaine with afrin was sprayed into the nasal cavities. After allowing a time for anesthesia, a 0 rigid nasal endoscope was used to examine the nasal septum, turbinates, and mucosal tissue on the right. The middle meatus and sphenoethmoid recess was examined. There were no complications.      Pertinent findings include: There was dense hard mucoid debris in the right nasal passage which was removed with #10 Stateless suction and alligator forceps. Underlying this hard debris was some mucopurulent drainage which was suctioned. Right middle turbinate small. Nasal septum deviated to the left. After complete suctioning of debris and purulent rhinorrhea there was noted to be a pedunculated area of the posterior lateral nasal wall that is similar in size to prior endoscopic examination, see picture below.         [address] [address] [address] [place], [place]  [address]                                        Fax [phone]   [phone]   Department of Pathology   FINAL SURGICAL PATHOLOGY REPORT   Patient Name: Minh Hanna            Accession No:  [id number]    Age Sex:   1945    75 Y / M       Location:      [place] [address] Account No:   [de-identified]                  Collected:     09/16/2021   Med Rec No:    [id number]                 Received:      09/16/2021   Attend Phys:   Maribel Dejesus MD            Completed:     09/17/2021   Perform Phys: Maribel Dejesus MD     FINAL DIAGNOSIS:     T4 bone biopsy:      - Bone tissue with marked necrosis and rare atypical cells with        degeneration- See Comment. COMMENT: The patient's history of sinus poorly differentiated carcinoma   is noted. Cytokeratin AE1/3C stain shows high background staining within   necrosis. GMS and AFB stains show no microorganisms. It is unclear   whether this presents tumor necrosis or an infectious/ inflammatory   process; clinical correlation is necessary. Assessment and Plan     1. Cancer of nasal cavity and sinus ([address]  2. S/P radiation therapy  3. Nose septum deviation  Posterior lateral nasal lesion on endoscopic examination concerning for recurrence of sinonasal squamous cell carcinoma. He does have a PET scan ordered per Dr. Susy Cason, radiation oncology. I recommend that he obtains this as soon as possible and follow-up with me soon after. Given history of recent pathological fracture concerning for possible metastatic spread of cancer, will need to evaluate and see if there is any other potential sites of metastasis. If CT PET scan clear will proceed with biopsy of right posterior lateral nasal lesion in the operating room. 4. Bilateral hearing loss, unspecified hearing loss type  Possible right middle ear effusion noted on examination. He is set to have a repeat hearing test at [facility] later this month. After obtaining this he will send the results or bring them with him to be addressed at his follow-up visit. Follow-Up     No follow-ups on file. Yandy Peck   Department of Otolaryngology/Head and Neck Surgery  10/4/21    Medical Decision Making:   The following items were considered in medical decision making:  Independent review of images  Review / order clinical lab tests  Review / order radiology tests  Decision to obtain old records    Portions of this note were dictated using Dragon.  There may be linguistic errors secondary to the use of this program.

## 2021-10-04 NOTE — Clinical Note
To me and looks like he had could have a possible recurrence of his cancer in his right posterior lateral nasal cavity. He said that you ordered a PET scan. I think this is a good idea to see if there is any other distant metastasis, especially in light of his recent pathological fracture. If PET scan otherwise clear will do a biopsy of this nasal lesion to see if there is recurrence.

## 2021-10-05 NOTE — PROGRESS NOTES
Post-Discharge Transitional Care Management Services or Hospital Follow Up      [address]   YOB: 1945    Date of Office Visit:  10/5/2021  Date of Hospital Admission: 9/7/21  Date of Hospital Discharge: 9/17/21  Readmission Risk Score(high >=14%.  Medium >=10%):Readmission Risk Score: 26      Care management risk score Rising risk (score 2-5) and Complex Care (Scores >=6): 0     Non face to face  following discharge, date last encounter closed (first attempt may have been earlier): telephone contact made 09/23/2021     Call initiated 2 business days of discharge:      Patient Active Problem List   Diagnosis    Subacute pansinusitis    Nasal polyposis    Cancer of nasal cavity and sinus ([facility] [facility]    Abdominal aortic aneurysm (AAA) without rupture ([address]    Uncontrolled pain    Pathologic thoracic fracture, initial encounter    Hypertensive urgency    Spine metastasis ([facility] [address] [facility]    Bandemia    Hyponatremia    Coronary artery disease due to lipid rich plaque    Fatty liver disease, nonalcoholic    Essential hypertension    Hyperlipidemia       Allergies   Allergen Reactions    Fish Oil Anaphylaxis    No Known Allergies Anaphylaxis    Shellfish Allergy Anaphylaxis    Lisinopril      Other reaction(s): Cough       Medications listed as ordered at the time of discharge from [facility] Medication Instructions MACY:    Printed on:10/06/21 1202   Medication Information                      atorvastatin (LIPITOR) 40 MG tablet  Take 40 mg by mouth nightly              finasteride (PROSCAR) 5 MG tablet  Take 1 tablet by mouth daily             Magnesium 400 MG CAPS  Take 400 mg by mouth daily              methylPREDNISolone (MEDROL, YANA,) 4 MG tablet  Take 1 tablet by mouth See Admin Instructions Take by mouth.             methylPREDNISolone (MEDROL, YANA,) 4 MG tablet  Take 1 tablet by mouth See Admin Instructions Take by mouth.             montelukast (SINGULAIR) 10 MG tablet  TAKE 1 TABLET BY MOUTH EVERY NIGHT             Multiple Vitamin (MULTIVITAMIN) tablet  Take 1 tablet by mouth daily              oxyCODONE-acetaminophen (PERCOCET)  MG per tablet  1 tablet: TAKE 1 TABLET EVERY 8 HOURS AS NEEDED FOR PAIN             sodium chloride (OCEAN, BABY AYR) 0.65 % nasal spray  1 spray by Nasal route every 6 hours as needed (moisturization)             tamsulosin (FLOMAX) 0.4 MG capsule  Take 1 capsule by mouth 2 times daily             valsartan (DIOVAN) 80 MG tablet  Take 80 mg by mouth daily             vitamin C (ASCORBIC ACID) 500 MG tablet  Take 500 mg by mouth daily              vitamin D-3 (CHOLECALCIFEROL) 125 MCG (5000 UT) TABS  Take by mouth                   Medications marked \"taking\" at this time  Outpatient Medications Marked as Taking for the 10/5/21 encounter (Office Visit) with Cheryl Sahni MD   Medication Sig Dispense Refill    oxyCODONE-acetaminophen (PERCOCET)  MG per tablet 1 tablet: TAKE 1 TABLET EVERY 8 HOURS AS NEEDED FOR PAIN      sodium chloride (OCEAN, BABY AYR) 0.65 % nasal spray 1 spray by Nasal route every 6 hours as needed (moisturization) 1 each 1    finasteride (PROSCAR) 5 MG tablet Take 1 tablet by mouth daily 30 tablet 3    tamsulosin (FLOMAX) 0.4 MG capsule Take 1 capsule by mouth 2 times daily 30 capsule 3    montelukast (SINGULAIR) 10 MG tablet TAKE 1 TABLET BY MOUTH EVERY NIGHT 90 tablet 3    atorvastatin (LIPITOR) 40 MG tablet Take 40 mg by mouth nightly       valsartan (DIOVAN) 80 MG tablet Take 80 mg by mouth daily      Multiple Vitamin (MULTIVITAMIN) tablet Take 1 tablet by mouth daily       Magnesium 400 MG CAPS Take 400 mg by mouth daily       vitamin D-3 (CHOLECALCIFEROL) 125 MCG (5000 UT) TABS Take by mouth      vitamin C (ASCORBIC ACID) 500 MG tablet Take 500 mg by mouth daily           Medications patient taking as of now reconciled against medications ordered at time of hospital discharge: Yes    Chief Complaint   Patient presents with    Follow-Up from Hospital       HPI    Inpatient course: Discharge summary reviewed- see chart. Interval history/Current status:     Patient went to ER as directed at last office visit secondary to severe R shoulder pain and and had a cardiac workup done which was Negartive for MI or ischemia but did have a CT scan spine done which showed metastatic disease with a pathologic fracture T4 and pt underwent kyphoplasty with improvement and was Rx opioid medication as needed    Pt did see ENT yesterday and had an laryngoscopy done which showed a new nasal cavity lesion     In the hospital pt was started on flomax and proscar medications for BPH and noted kidney stones    Of note pt did recently complete 30 radiation treatments for his nasal sinus cancer. Pt is currently on a saline/steroid/antibiotic spray in his nose twice a day     Patient has a past medical history significant for dyslipidemia, HTN and CAD s/p MI with stent placement 2017 and is followed by Cardiology and is on lipitor and diovan medication.      Pt denies any hx of asthma or strokes     FHx unknown; Pt refuses to get COVID vaccine     Pt is an exsmoker quit 2001 and denies alcohol [facility] discharge summary 09/07/2021  67 y. o. 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  Patient states that the pain has been there for the past several weeks.  Has been addressed with PMD who was concerned for cardiac etiology.  Patient was seen at [facility] on 8/31/2021 at which time he was evaluated with EKG troponins CT chest and nuclear stress test which did not reveal etiology.  Patient was also supposed to get gallbladder ultrasound to rule out biliary etiology of this pain however opted to do it outpatient. Sathish Reed presents today to [facility] ER again with intractable back pain.  Muscle relaxants prescribed at [facility] are not helping.  Per patient spouse have all supply of pain medications from back when he had surgeries and has been managing with taking 1 pill a day.  At the ED today CT chest shows metastatic disease and pathologic fracture of T4.  Lucent lesions at T9 and L4 are also noted.  Patient with marked leukocytosis of 21,000 with elevated lactate at 4.1 with resulting metabolic acidosis with anion gap with respiratory compensation.      Intractable mid upper back pain  Secondary to T4 pathologic fracture with metastatic disease  Pain control  IR consulted status post kyphoplasty and biopsy of the lesion at T4  We will follow-up with oncology for biopsy results     Hematemesis  Resolved  S/p  EGD patchy gastritis coffee-ground material, epistaxis?     Urinary retention  Bilateral hydronephrosis  Small renal calculus  Continue following  Urology input appreciated  Voiding after catheter removal  We will follow up with urology     Severe sepsis with lactic acidosisresolved  Most likely source upper respiratory infection  Hospitalist broad-spectrum antibiotics including anaerobic coverage   ID input appreciated     Anion gap acidosis  Likely secondary to elevated lactic acid  Resolved     Accelerated hypertensionimproved in view of pain  Continue home dose of valsartan  IV hydralazine as needed     History of coronary heart disease  Recent stress test showed no ischemia  Unable to take antiplatelets in view of epistaxis     Squamous cell carcinoma of the sinus  Metastatic  ENT and heme-onc consulted     Bilateral hydronephrosis with small renal calculus believed to be obstructing  We will update urology       Echo - normal, EF 55-60  Radha Myoview - EF 56%, no rev ischemia, small fixed apical defect likely not clinically significant. Low risk.         Review of Systems   Constitutional: Palpations: Abdomen is soft. Tenderness: There is no abdominal tenderness. Musculoskeletal:      Comments: No leg edema noted B/L;  Back : positive mild tendernous to palpation noted over thoracic spine   Lymphadenopathy:      Cervical: No cervical adenopathy. Skin:     Findings: No rash. Neurological:      Mental Status: He is alert. Comments: Cranial nerves 2 - 12 grossly intact; Muscle strength 5/5 throughout   Psychiatric:         Mood and Affect: Mood normal.             Assessment/Plan:  1. Spine metastasis (HCC)Pathologic thoracic fracture  Pt clinically improved s/p kyphoplasty and is taking OTC tylenol as needed for pain  - NM DISCHARGE MEDS RECONCILED W/ CURRENT OUTPATIENT MED LIST    2. Cancer of sinus ([address]  Pt is being managed by ENT and Oncology but is reluctant to pursue any treatment with further radiation or chemotherapy     3. Coronary artery disease involving native coronary artery of native heart without angina pectoris  Patient clinically stable on present medications and had recent cardiac stress test done which showed a small fixed defect in the apical septal segment which may represent a small scar/infarct but no signs of reversible ischemia     4. Essential hypertension  Patient clinically stable on present medications    5. Urinary retention  Pt is followed by Urology and is clinically improved on flomax and proscar medications           Return in about 2 months (around 12/5/2021).       Medical Decision Making: moderate complexity

## 2021-10-12 NOTE — TELEPHONE ENCOUNTER
Received telephone call from Dr. Bobbi Rubin of Radiation Oncology who recommends that patient undergo radiation therapy of his metastatic T4 spine lesion ASAP to prevent future pain/paralysis but patient refused. Spoke to patient and his wife and they do not want any additional radiation therapy are are going to make an appointment with [facility] for a second opinion.  They are also asking for a refill of percocet pain medication

## 2021-10-15 NOTE — PROGRESS NOTES
[facility] 12 VISIT      Patient Name: Luisa Nair  Medical Record Number:  [id number]  Primary Care Physician:  Nadya Hummel MD    ChiefComplaint     Chief Complaint   Patient presents with    Other     Patient states he recieved pet scan on monday, Dr. Keshia Schwartz was filling for Dr. Jorge Liz and went over pet results with patient, recheck sinuses and nasal cavity, going to [facility] for hearing test.        History of Present Illness     Luisa Nair is an 67 y.o. male previously seen for right sinonasal squamous cell carcinoma.  Status post IMRT radiation completed July 14 2021. Functional endoscopic sinus surgery bilateral with primarily right side, on the left side it was anterior ethmoid and maxillary sinus, on the right side it was anterior and posterior ethmoidectomies, sphenoid sinusotomies, maxillary antrostomy, frontal sinus ostium enlargement and removal of large polypoid tissue by Dr. Sasha Smith on 11/2/2020.     Right-sided functional endoscopic sinus surgery including removal of tumor from anterior and posterior ethmoids, sphenoid sinus opening, and maxillary sinus by Dr. Sasha Smith on 4/12/2021. Interval History:   Has been utilizing sinonasal saline rinses on a daily basis. No nosebleeds. No mucopurulent drainage. PET scan demonstrated hypermetabolic lesion at T4 concerning for metastasis. He states that he has been called by both his primary care doctor and the radiation oncologist multiple times for this, telling him that he needs radiation therapy. He is very adamant that he does not want to have any type of further intervention other than may be a simple surgery. The radiation he had in the past was very draining to him and is adamant that he will not undergo that in the future. He states that he just wants to be made comfortable.     Past Medical History     Past Medical History:   Diagnosis Date    [facility]ab[facility] [address] [facility]     CAD (coronary artery disease)     stents 2017    Cancer ([facility] [address]     Hyperlipidemia     Hypertension     Nasal bleeding     worse since covid making the mask mandatory       Past Surgical History     Past Surgical History:   Procedure Laterality Date    CARDIAC SURGERY  2017    stents placed     COLONOSCOPY      IR KYPHOPLASTY THORACIC FIRST LEVEL  9/16/2021    IR KYPHOPLASTY THORACIC FIRST LEVEL 9/16/2021 [facility]    SINUS ENDOSCOPY N/A 11/2/2020    FUNCTIONAL ENDOSCOPIC SINUS SURGERY performed by Rayray Smith MD at [address] [place] [address] Right 4/12/2021    RIGHT FUNCTIONAL ENDOSCOPIC SINUS SURGERY (04039, [id number], [id number]) performed by Rayray Smith MD at [address] N/A 9/8/2021    EGD GASTRIC BIOPSY performed by Janette Subramanian MD at [address] [facility] ENDOSCOPY       Family History     Family History   Problem Relation Age of Onset    Other Brother         anurysim       Social History     Social History     Tobacco Use    Smoking status: Never Smoker    Smokeless tobacco: Never Used   Vaping Use    Vaping Use: Never used   Substance Use Topics    Alcohol use: Never    Drug use: Never        Allergies     Allergies   Allergen Reactions    Fish Oil Anaphylaxis    No Known Allergies Anaphylaxis    Shellfish Allergy Anaphylaxis    Lisinopril      Other reaction(s): Cough       Medications     Current Outpatient Medications   Medication Sig Dispense Refill    oxyCODONE-acetaminophen (PERCOCET)  MG per tablet Take 1 tablet by mouth daily as needed for Pain (severe) for up to 30 days.  Intended supply: 30 days 30 tablet 0    oxyCODONE-acetaminophen (PERCOCET)  MG per tablet 1 tablet: TAKE 1 TABLET EVERY 8 HOURS AS NEEDED FOR PAIN      sodium chloride (OCEAN, BABY AYR) 0.65 % nasal spray 1 spray by Nasal route every 6 hours as needed (moisturization) 1 each 1    finasteride (PROSCAR) 5 MG tablet Take 1 tablet by mouth daily 30 tablet 3    tamsulosin (FLOMAX) 0.4 MG capsule Take 1 capsule by mouth 2 times daily 30 capsule 3    montelukast (SINGULAIR) 10 MG tablet TAKE 1 TABLET BY MOUTH EVERY NIGHT 90 tablet 3    atorvastatin (LIPITOR) 40 MG tablet Take 40 mg by mouth nightly       valsartan (DIOVAN) 80 MG tablet Take 80 mg by mouth daily      Multiple Vitamin (MULTIVITAMIN) tablet Take 1 tablet by mouth daily       Magnesium 400 MG CAPS Take 400 mg by mouth daily       vitamin D-3 (CHOLECALCIFEROL) 125 MCG (5000 UT) TABS Take by mouth      vitamin C (ASCORBIC ACID) 500 MG tablet Take 500 mg by mouth daily       methylPREDNISolone (MEDROL, YANA,) 4 MG tablet Take 1 tablet by mouth See Admin Instructions Take by mouth. (Patient not taking: Reported on 6/1/2021) 1 kit 0    methylPREDNISolone (MEDROL, YANA,) 4 MG tablet Take 1 tablet by mouth See Admin Instructions Take by mouth. (Patient not taking: Reported on 5/25/2021) 1 kit 0     No current facility-administered medications for this visit.        Review of Systems     REVIEW OF SYSTEMS  The following systems were reviewed and revealed the following in addition to any already discussed in the HPI:    CONSTITUTIONAL: no weight loss, no fever, no night sweats, no chills  EYES: no vision changes, no blurry vision  EARS: no hearing loss, no otalgia  NOSE: no epistaxis, no rhinorrhea  THROAT: No voice changes, no sore throat, no dysphagia    PhysicalExam     Vitals:    10/15/21 1011   BP: 123/67   Site: Left Upper Arm   Position: Sitting   Cuff Size: Medium Adult   Pulse: 58   Temp: 98.1 °F (36.7 °C)   TempSrc: Temporal   Weight: 175 lb (79.4 kg)       PHYSICAL EXAM  /67 (Site: Left Upper Arm, Position: Sitting, Cuff Size: Medium Adult)   Pulse 58   Temp 98.1 °F (36.7 °C) (Temporal)   Wt 175 lb (79.4 kg)   BMI 25.84 kg/m²     GENERAL: No acute distress, alert and oriented, no hoarseness  EYES: EOMI, Anti-icteric  NOSE: On anterior rhinoscopy there is crusting within the right nasal passage. See procedure note below. EARS: Normal external appearance; on portable otomicroscopy:     -Ad: External auditory canal without stenosis, tympanic membrane thickened, unable to visualize middle ear space. Tympanic membrane with decreased mobility on pneumatic otoscopy. -As: External auditory canal without stenosis, tympanic membrane clear, no middle ear effusions or retractions  FACE: HB 1/6 bilaterally, symmetric appearing, sensation equal bilaterally  ORAL CAVITY: No masses or lesions visualized or palpated, uvula is midline, moist mucous membranes  NECK: Normal range of motion, no thyromegaly, trachea is midline, no palpable lymphadenopathy or neck masses, no crepitus  CHEST: Normal respiratory effort, breathing comfortably, no retractions  SKIN: No rashes, normal appearing skin, no evidence of skin lesions/tumors  NEURO: Cranial Nerves 2, 3, 4, 5, 6, 7, 11, 12 intact bilaterally     I have performed a head and neck physical exam personally or was physically present during the key or critical portions of the service. Procedure     Nasal Endoscopy, Diagnostic (72549)     Pre-op: History of squamous cell carcinoma nasal cavity and sinus  Post-op: Same  Procedure: Nasal endoscopy     After obtaining verbal consent from the patient 2% lidocaine with afrin was sprayed into the nasal cavities.  After allowing a time for anesthesia, a 0 rigid nasal endoscope was used to examine the nasal septum, turbinates, and mucosal tissue on the right.  The middle meatus and sphenoethmoid recess was examined. Louanna Rios were no complications.      Pertinent findings include: Small amount of overlying yellow crusting in the posterior nasal passage which was removed with #10 Japanese suction and alligator forceps. There is an area of pedunculated pale appearing mucosa on the posterior lateral nasal wall similar size to prior endoscopic examination, essentially unchanged from prior examination.   No evidence of purulence. Data/Imaging Review     PET scan performed on 10/11/2021 independently reviewed by myself which demonstrates intense hypermetabolic uptake at the level of T4 suspicious for extraosseous compression of the spinal cord. Within the posterior lateral nasal cavity there is more mild hypermetabolic uptake at the site of visualized abnormality on endoscopic examination. Assessment and Plan     1. Cancer of nasal cavity and sinus ([address]  2. Pathological fracture of vertebra due to other disease, initial encounter  Had a long conversation with the patient concerning his PET scan and prognosis. PET scan is very concerning for metastatic process of his T4 spine which could certainly represent metastasis from his primary sinonasal carcinoma. Pertaining to the residual abnormal mucosa in the posterior lateral right nasal cavity my recommendation would be for biopsy. I did tell him that if we do a biopsy and it does come back as residual cancer we would need further adjuvant therapy in the form of possible radiation or chemotherapy. The patient is very adamant that he does not want any further treatment for cancer including radiation therapy, major surgery, chemo. Rather he states that he would like to be made comfortable for whatever time he has left. Given this decision by the patient I have placed referral to palliative care. I will see him back in 3 months for reevaluation of his nasal cavity to see if there is any growth or change of his posterior lateral nasal lesion at that time. Follow-Up     Return in about 3 months (around 1/15/2022). [facility]   Department of Otolaryngology/Head and Neck Surgery  10/18/21    Medical Decision Making:   The following items were considered in medical decision making:  Independent review of images  Review / order clinical lab tests  Review / order radiology tests  Decision to obtain old records      This note was generated completely or in part utilizing Dragon dictation speech recognition software. Occasionally, words are mistranscribed and despite editing, the text may contain inaccuracies due to incorrect word recognition. If further clarification is needed please contact the office at [phone].

## 2021-10-19 NOTE — TELEPHONE ENCOUNTER
Orders scanned to Dayton Dill on PCP desk for signing.      PCP: Please route message back to department pool and return original paperwork to MA to process / fax

## 2021-10-22 NOTE — TELEPHONE ENCOUNTER
Patients wife Rudy Irvin called in regards to request office notes from his PCP relating to his current diagnosis so that they could pursue additional means of treatment. Please call patient's wife to discuss required actions for this to take place.

## 2021-10-25 NOTE — TELEPHONE ENCOUNTER
Please print off the following OV notes:   10/5/2021  8/31/2021  06/01/2021  03/29/2021    Patient's wife will come to the office to  these notes. Please have her sign a MANINDER (Release of Information) and scan that to the chart to release those notes to her. Okay to sign encounter once task has been completed.

## 2021-11-04 PROBLEM — R07.9 CHEST PAIN: Status: ACTIVE | Noted: 2021-01-01

## 2021-11-04 NOTE — PRE SEDATION
Brief Pre-Op Note/Sedation Assessment      Laura Rose  1945  [id number]  12:53 PM    Planned Procedure: Cardiac Catheterization Procedure  Post Procedure Plan: Return to same level of care  Consent: I have discussed with the patient and/or the patient representative the indication, alternatives, and the possible risks and/or complications of the planned procedure and the anesthesia methods. The patient and/or patient representative appear to understand and agree to proceed. Chief Complaint:   Chest Pain/Pressure  Anginal Equivalent  NSTEMI      Indications for Cath Procedure:  1. Presentation:  ACS <= 24 hrs, New Onset Angina <= 2 months, Worsening Angina and Suspected CAD  2. Anginal Classification within 2 weeks:  CCS IV - Inability to perform any activity without angina or angina at rest, i.e., severe limitation  3. Angina Symptoms Assessment:  Typical Chest Pain  4. Heart Failure Class within last 2 weeks:  No symptoms  5. Cardiovascular Instability:  Yes:  Persistent ischemic symptoms (CP, ST Elevation)    Prior Ischemic Workup/Eval:  1. Pre-Procedural Medications: Yes: Aspirin, Beta Blockers and STATIN  2. Stress Test Completed? No    Does Patient need surgery? Cath Valve Surgery:  No    Pre-Procedure Medical History:  Vital Signs:  BP (!) 154/82   Pulse 55   Temp 97.9 °F (36.6 °C) (Oral)   Resp 8   Ht 5' 9\" (1.753 m)   Wt 167 lb 14.4 oz (76.2 kg)   SpO2 96%   BMI 24.79 kg/m²     Allergies:     Allergies   Allergen Reactions    Fish Oil Anaphylaxis    No Known Allergies Anaphylaxis    Shellfish Allergy Anaphylaxis    Lisinopril      Other reaction(s): Cough     Medications:    Current Facility-Administered Medications   Medication Dose Route Frequency Provider Last Rate Last Admin    atorvastatin (LIPITOR) tablet 40 mg  40 mg Oral Nightly Salvador RIGOBERTO Haider-GOSIA        finasteride (PROSCAR) tablet 5 mg  5 mg Oral Daily SalvadorRIGOBERTO Richard-C   5 mg at 11/04/21 1004    montelukast (SINGULAIR) tablet 10 mg  10 mg Oral Nightly RIGOBERTO Vale-GOSIA        oxyCODONE-acetaminophen (PERCOCET)  MG per tablet 1 tablet  1 tablet Oral Daily PRN Nic Salinas PA-C   1 tablet at 11/04/21 1004    tamsulosin (FLOMAX) capsule 0.4 mg  0.4 mg Oral BID RIGOBERTO Vale-C   0.4 mg at 11/04/21 1004    valsartan (DIOVAN) tablet 80 mg  80 mg Oral Daily RIGOBERTO Vale-C   80 mg at 11/04/21 1004    sodium chloride flush 0.9 % injection 10 mL  10 mL IntraVENous 2 times per day RIGOBERTO Vale-C   10 mL at 11/04/21 1004    sodium chloride flush 0.9 % injection 10 mL  10 mL IntraVENous PRN RIGOBERTO Vale-C        0.9 % sodium chloride infusion  25 mL IntraVENous PRN Nic Salinas PA-C        acetaminophen (TYLENOL) tablet 650 mg  650 mg Oral Q6H PRN Nic Salinas PA-C        Or    acetaminophen (TYLENOL) suppository 650 mg  650 mg Rectal Q6H PRN RIGOBERTO Vale-GOSIA        magnesium hydroxide (MILK OF MAGNESIA) 400 MG/5ML suspension 30 mL  30 mL Oral Daily PRN Nic Salinas PA-C        [START ON 11/5/2021] aspirin chewable tablet 81 mg  81 mg Oral Daily Bharati Damian PA-C        nitroGLYCERIN (NITROSTAT) SL tablet 0.4 mg  0.4 mg SubLINGual Q5 Min PRN Nic Salinas PA-C        ondansetron [facility]) injection 4 mg  4 mg IntraVENous Q6H PRN ORLIN ValeC        heparin (porcine) injection 4,000 Units  4,000 Units IntraVENous PRN Ileana Tomlin MD        heparin (porcine) injection 2,000 Units  2,000 Units IntraVENous PRN Ileana Tomlin MD           Past Medical History:    Past Medical History:   Diagnosis Date    AAA (abdominal aortic aneurysm) ([facility] [facility]     CAD (coronary artery disease)     stents 2017    Cancer ([facility] [address]     Hyperlipidemia     Hypertension     Nasal bleeding     worse since covid making the mask mandatory       Surgical History:    Past Surgical History:   Procedure Laterality Date    CARDIAC SURGERY  2017    stents placed     COLONOSCOPY      IR KYPHOPLASTY THORACIC FIRST LEVEL  9/16/2021    IR KYPHOPLASTY THORACIC FIRST LEVEL 9/16/2021 [facility]ED[facility]    SINUS ENDOSCOPY N/A 11/2/2020    FUNCTIONAL ENDOSCOPIC SINUS SURGERY performed by Kaitlyn Rodriguez MD at [address] [place] [address] 4/12/2021    RIGHT FUNCTIONAL ENDOSCOPIC SINUS SURGERY ([id number], [id number], [id number]) performed by Kaitlyn Rodriguez MD at [facility] 9/8/2021    EGD GASTRIC BIOPSY performed by Monica Clay MD at [address] [place] [address]             Pre-Sedation:  Pre-Sedation Documentation and Exam:  I have assessed the patient and reviewed the H&P on the chart. Prior History of Anesthesia Complications:   none    Modified Mallampati:  II (soft palate, uvula, fauces visible)    ASA Classification:  Class 2 - A normal healthy patient with mild systemic disease    Margaret Scale: Activity:  2 - Able to move 4 extremities voluntarily on command  Respiration:  2 - Able to breathe deeply and cough freely  Circulation:  2 - BP+/- 20mmHg of normal  Consciousness:  2 - Fully awake  Oxygen Saturation (color):  2 - Able to maintain oxygen saturation >92% on room air    Sedation/Anesthesia Plan:  Guard the patient's safety and welfare. Minimize physical discomfort and pain. Minimize negative psychological responses to treatment by providing sedation and analgesia and maximize the potential amnesia. Patient to meet pre-procedure discharge plan.     Medication Planned:  midazolam intravenously and fentanyl intravenously    Patient is an appropriate candidate for plan of sedation:   yes      Electronically signed by Osmar Delgadillo MD on 11/4/2021 at 12:53 PM

## 2021-11-04 NOTE — H&P
[facility] Medicine History & Physical      Patient Name: Hamlet Vargas    : 1945    PCP: Katie Lopez MD    Date of Service:  Patient seen and examined on 2021     Chief Complaint:  Chest pain    History Of Present Illness:    Hamlet Vargas is a 67 y.o. male who presented to ED with complaint of midsternal, nonradiating chest pain. Patient describes pain as pressure that gets worse when he walks. He reports pain has been ongoing for a few days but got worse yesterday evening. He reports pain was an 8/10 at its worse. Patient has a history of CAD s/p 2 stents. He denies fever, dizziness, shortness of breath, cough, edema, abdominal pain, nausea, vomiting, diarrhea, constipation, urinary symptoms. Past Medical History:    Patient has a past medical history of AAA (abdominal aortic aneurysm) ([facility] [address] CAD (coronary artery disease), Cancer ([facility] [address] Hyperlipidemia, Hypertension, and Nasal bleeding. Past Surgical History:    Patient has a past surgical history that includes Cardiac surgery (); Colonoscopy; Sinus endoscopy (N/A, 2020); Sinus endoscopy (Right, 2021); Upper gastrointestinal endoscopy (N/A, 2021); and IR KYPHOPLASTY THORACIC 1 VERTEBRAL BODY (2021). Medications Prior to Admission:      Prior to Admission medications    Medication Sig Start Date End Date Taking? Authorizing Provider   oxyCODONE-acetaminophen (PERCOCET)  MG per tablet Take 1 tablet by mouth daily as needed for Pain (severe) for up to 30 days.  Intended supply: 30 days 10/12/21 11/11/21 Yes Ruddy Ramsay MD   oxyCODONE-acetaminophen (PERCOCET)  MG per tablet 1 tablet: TAKE 1 TABLET EVERY 8 HOURS AS NEEDED FOR PAIN 21  Yes Historical Provider, MD   finasteride (PROSCAR) 5 MG tablet Take 1 tablet by mouth daily 21  Yes Raymundo VILLASENOR MD   tamsulosin (FLOMAX) 0.4 MG capsule Take 1 capsule by mouth 2 times daily 21  Yes MD isaiah King (SINGULAIR) 10 MG tablet TAKE 1 TABLET BY MOUTH EVERY NIGHT 9/7/21  Yes Syeda Zapata DO   atorvastatin (LIPITOR) 40 MG tablet Take 40 mg by mouth nightly  10/29/19  Yes Historical Provider, MD   valsartan (DIOVAN) 80 MG tablet Take 80 mg by mouth daily 7/1/20  Yes Historical Provider, MD   Multiple Vitamin (MULTIVITAMIN) tablet Take 1 tablet by mouth daily    Yes Historical Provider, MD   Magnesium 400 MG CAPS Take 400 mg by mouth daily    Yes Historical Provider, MD   vitamin D-3 (CHOLECALCIFEROL) 125 MCG (5000 UT) TABS Take by mouth   Yes Historical Provider, MD   vitamin C (ASCORBIC ACID) 500 MG tablet Take 500 mg by mouth daily    Yes Historical Provider, MD   sodium chloride (OCEAN, BABY AYR) 0.65 % nasal spray 1 spray by Nasal route every 6 hours as needed (moisturization) 9/17/21   Raymundofeliz VILLASENOR MD   methylPREDNISolone (MEDROL, YANA,) 4 MG tablet Take 1 tablet by mouth See Admin Instructions Take by mouth. Patient not taking: Reported on 6/1/2021 4/13/21   Radha Juarez MD   methylPREDNISolone (MEDROL, YANA,) 4 MG tablet Take 1 tablet by mouth See Admin Instructions Take by mouth. Patient not taking: Reported on 5/25/2021 4/12/21   Radha Juarez MD       Allergies:  Fish oil, No known allergies, Shellfish allergy, and Lisinopril    Social History:      TOBACCO:   reports that he has never smoked. He has never used smokeless tobacco.  ETOH:   reports no history of alcohol use. DRUGS:  reports no history of drug use. Family History:      Reviewed in detail positive as follows:        Problem Relation Age of Onset    Other Brother         anurysim       REVIEW OF SYSTEMS:   Pertinent positives as noted in the HPI. All other systems reviewed and negative. PHYSICAL EXAM PERFORMED:    BP (!) 173/93   Pulse 61   Temp 98 °F (36.7 °C)   Resp 16   SpO2 97%     General appearance:  Awake, alert, no apparent distress  HEENT:  Normocephalic, atraumatic without obvious deformity. PERRL.  EOM intact. Conjunctivae/corneas clear. Neck: Supple, with full range of motion. No JVD. Trachea midline. Respiratory:  Clear to auscultation bilaterally without rales, wheezes, or rhonchi. Normal respiratory effort. Cardiovascular:  Regular rate and rhythm without murmurs, rubs or gallops. Abdomen: Soft, NT, ND, without rebound or guarding. Normal bowel sounds. Extremities:  No clubbing, cyanosis, or edema bilaterally. Full range of motion without deformity. +2 palpable pulses, equal bilaterally. Capillary refill brisk,< 3 seconds   Skin: No rashes or lesions. Warm/dry. Neurologic:  Neurovascularly intact without any focal sensory/motor deficits. Cranial nerves: II-XII intact, grossly non-focal. Alert and oriented x 3. Normal speech. Psychiatric:  Thought content appropriate, normal insight. Labs:   CBC   Recent Labs     11/03/21  2341   WBC 9.6   HGB 11.5*   HCT 35.1*           RENAL  Recent Labs     11/03/21  2341      K 4.1      CO2 21   BUN 16   CREATININE 0.6*       LFTS  Recent Labs     11/03/21  2341   AST 20   ALT 28   BILITOT 0.3   ALKPHOS 95       COAG  No results for input(s): INR in the last 72 hours. CARDIAC ENZYMES  Recent Labs     11/03/21  2341   TROPONINI <0.01       LIPIDS  Cholesterol, Total   Date/Time Value Ref Range Status   03/29/2021 12:21  0 - 199 mg/dL Final     Triglycerides   Date/Time Value Ref Range Status   03/29/2021 12:21  0 - 150 mg/dL Final     HDL   Date/Time Value Ref Range Status   03/29/2021 12:21 PM 31 (L) 40 - 60 mg/dL Final     LDL Calculated   Date/Time Value Ref Range Status   03/29/2021 12:21 PM 93 <100 mg/dL Final         Radiology:     CT CHEST PULMONARY EMBOLISM W CONTRAST   Final Result   1. No pulmonary embolism. 2. The lungs are clear. 3. Mild mediastinal and hilar lymph node enlargement of uncertain   significance. 4. Interval T4 kyphoplasty.          XR CHEST PORTABLE   Final Result   Probable pattern of mild chronic interstitial change in the chest.  No acute   pulmonary finding. 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 Recurrent disease noted 2/2021 and excision 4/2021. Completed post op XRT 7/2021  Recently diagnosed with metastasis to spine  Continue home percocet for pain    DVT prophylaxis: Lovenox  Probiotic if on abx: N/A    Diet: Diet NPO Exceptions are: Sips of Water with Meds  Code Status: Full Code    Consults:  IP CONSULT TO HOSPITALIST    Disposition: Place in observation  ELOS: Less than two midnights    Yariel Paiz PA-C    Thank you Leoonr Centeno MD for the opportunity to be involved in this patient's care. If you have any questions or concerns please feel free to contact me at [phone].

## 2021-11-04 NOTE — CONSULTS
[facility]                     [facility], [address]                                  CONSULTATION    PATIENT NAME: Baltazar Casarez                   :        1945  MED REC NO:   [id number]                          ROOM:       1652  ACCOUNT NO:   [de-identified]                           ADMIT DATE: 2021  PROVIDER:     Rafiq Davis MD    CONSULT DATE:  2021    REASON FOR CONSULTATION:  Evaluate patient with chest pain and elevated  troponin. HISTORY OF PRESENT ILLNESS:  The patient is a pleasant 69-year-old  gentleman. He has enjoyed reasonably good health most of his life. He  was active. He did present approximately 4 years ago with acute  coronary syndrome, at that time he had drug-eluting stent placed in both  his left anterior descending and marginal vessel. He has done well from  a cardiac standpoint since. He had been followed by [facility], but due to ongoing followup he prefers to switch to [facility] for his care, in fact he had an appointment arranged  with Dr. Jeanne Russell next week. 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 Radiation therapy was extremely difficult for him. There is now concern about metastatic lesion to T4. He had a  kyphoplasty done last month. Radiation has been considered. He had a  bone biopsy which was nondiagnostic for metastatic disease. He declined  any further radiation therapy. He also has noted that with any activity  he becomes short of breath and develops chest discomfort. This has  progressively gotten more severe. For that reason, he came to the  hospital.  In the hospital, EKG was unremarkable. Troponin has risen  from admission of less than 0.01 to 0.18 and then 0.22. He currently  does not experience any chest discomfort.   He states he feels fine as  long as he lays at rest.  Medications prior to admission are Percocet  p.r.n., Proscar daily, Flomax 0.4 mg b.i.d., Singulair 10 mg daily,  Lipitor 40 mg daily, Diovan 80 mg daily, multivitamin, magnesium  capsules, vitamin D3, vitamin C. In the hospital, he is also now  receiving aspirin therapy. Also is started on Lipitor. PAST MEDICAL HISTORY:  Notable for coronary artery disease with  drug-eluting stent x2, one placed in left anterior descending, one  placed in marginal vessel; cardiac catheterization report is in [facility]. He has a history of hypertension, maxillary sinus squamous  cell with concern for metastatic disease to T4. He has nonalcoholic  liver disease, history of hypertension, hyperlipidemia. PAST SURGICAL HISTORY:  Includes coronary stenting procedure in 2017. Kyphoplasties and numerous sinus procedures. MEDICATIONS:  As listed above. ALLERGIES:  FISH OIL, LISINOPRIL, APPARENTLY THERE IS A SHELLFISH  ALLERGY. SOCIAL HISTORY:  Never smoked. No alcohol use. 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 PHYSICAL EXAMINATION:  VITAL SIGNS:  Blood pressure 154/80, appears comfortable at rest, pulse  rate 63 regular, afebrile. HEENT:  Sclerae are clear. Posterior pharynx clear. NECK:  Neck is supple. There are no carotid bruits. LUNGS:  Lung fields are clear. HEART:  Cardiac sounds are normal.  ABDOMEN:  Soft, nontender. EXTREMITIES:  Without edema. He has good peripheral pulses. NEUROLOGIC:  Moves all extremities well. Cranial nerves II through XII  intact. SKIN:  Warm and dry. LABORATORY DATA:  Lab work shows BUN of 14, creatinine 0.6, glucose 237  with previous glucoses being mildly elevated. Troponin less than 0.01  on admission, then 0.18 then 0.22. Hemoglobin 11.7, hematocrit 35.2,  white blood cell count 10,100.     DIAGNOSTIC DATA:  Also of note is that in 2019 the patient had abdominal  ultrasound which showed a 3 cm aorta so it was diagnosed as an abdominal  aortic aneurysm. However, he had CT scan last month which did not  comment on any aortic findings. IMPRESSION:  This patient has evidence for acute coronary syndrome with  elevated troponin. He has known coronary artery disease with stenting  procedures in the past.      He also has subacute back pain from a spontaneous T4 fracture concerning for metastatic disease to his spine  Had XRT of maxillary sinus squamous cell         RECOMMENDATIONS:  The patient will be started on intravenous fluids. Risk, benefits, options of cardiac catheterization were explained to the  patient in detail. He will proceed with that. He did make it known  that he does not want to have any further radiation therapy under any  circumstance. Please note 70 minutes time is spent with majority of the time with  direct patient contact and performing this consultation.         Shira Contreras MD    D: 11/04/2021 12:32:10       T: 11/04/2021 12:37:22     [email or web address]  Job#: [id number]     Doc#: [id number]    CC:

## 2021-11-04 NOTE — PLAN OF CARE
Nutrition Problem #1: Unintended weight loss  Intervention: Food and/or Nutrient Delivery: Continue NPO  Nutritional Goals: PO and Supplement intake 50% or greater

## 2021-11-04 NOTE — PROGRESS NOTES
Pt return from cath to room 3321 at 1615. Right radial Cath site CDI, pulses +2 bilaterally, wriist compression device. One stent placed  In OM 11 ml air at transfer to start removing per orders at 1515. Vitals stable,vitals  to be taken per order. Pt sleeping he may d/c tomorrow, f/u in two weeks. Report from nurse Sosa. IVF to run until tomorrow. MT verified telemetry Will continue to monitor.

## 2021-11-04 NOTE — ED NOTES
Bed: 23  Expected date:   Expected time:   Means of arrival:   Comments:  [id number] Teddy Serrano RN  11/04/21 0573

## 2021-11-04 NOTE — ED PROVIDER NOTES
[address] [facility]        Pt Name: Anthony Guallpa  MRN: [id number]  [place] 1945  Date of evaluation: 11/3/2021  Provider: Jamaal Pulido PA-C  PCP: Lauren Boss MD  Note Started: 11:10 PM EDT        I have seen and evaluated this patient with my supervising physician Stephane Nur DO.    CHIEF COMPLAINT       Chief Complaint   Patient presents with    Chest Pain     pt states that he has had sharp chest pain for the past two days feels like his heart is closing up. pt states he has been coughing and nauseous        HISTORY OF PRESENT ILLNESS   (Location, Timing/Onset, Context/Setting, Quality, Duration, Modifying Factors, Severity, Associated Signs and Symptoms)  Note limiting factors. Chief Complaint: Chest pain    Anthony Guallpa is a 67 y.o. male who presents to the emergency department with a chief complaint of some chest pain that he describes as a pressure that gets worse when he walks. This got worse tonight. He states it has been ongoing for a few days but he is unable to quantify how many days has been ongoing for. Recently discharged after having thoracic fracture that was pathologic and spinal metastasis. Had CT of the chest and nuclear stress test which did not reveal any etiology. Does have history of coronary artery disease with 2 stents, hyperlipidemia, hypertension and AAA. He denies nausea, vomiting, cough, fevers, abdominal pain, urinary or bowel symptoms. Rates the pain an 8 out of 10. Denies radiation of the pain. Denies any other pain at this time. Nursing Notes were all reviewed and agreed with or any disagreements were addressed in the HPI. REVIEW OF SYSTEMS    (2-9 systems for level 4, 10 or more for level 5)     Review of Systems    Positives and Pertinent negatives as per HPI. Except as noted above in the ROS, all other systems were reviewed and negative.        PAST MEDICAL HISTORY Past Medical History:   Diagnosis Date    [facility] [address]     CAD (coronary artery disease)     stents 2017    Cancer ([facility] [address]     Hyperlipidemia     Hypertension     Nasal bleeding     worse since covid making the mask mandatory         SURGICAL HISTORY     Past Surgical History:   Procedure Laterality Date    CARDIAC SURGERY  2017    stents placed     COLONOSCOPY      IR KYPHOPLASTY THORACIC FIRST LEVEL  9/16/2021    IR KYPHOPLASTY THORACIC FIRST LEVEL 9/16/2021 [facility] SPECIAL PROCEDURES    SINUS ENDOSCOPY N/A 11/2/2020    FUNCTIONAL ENDOSCOPIC SINUS SURGERY performed by Willie Davis MD at [facility] Right 4/12/2021    RIGHT FUNCTIONAL ENDOSCOPIC SINUS SURGERY (29949, [id number], [id number]) performed by Willie Davis MD at [address] 9/8/2021    EGD GASTRIC BIOPSY performed by Aretha Elizabeth MD at [address] [facility]       Previous Medications    ATORVASTATIN (LIPITOR) 40 MG TABLET    Take 40 mg by mouth nightly     FINASTERIDE (PROSCAR) 5 MG TABLET    Take 1 tablet by mouth daily    MAGNESIUM 400 MG CAPS    Take 400 mg by mouth daily     METHYLPREDNISOLONE (MEDROL, YANA,) 4 MG TABLET    Take 1 tablet by mouth See Admin Instructions Take by mouth. METHYLPREDNISOLONE (MEDROL, YANA,) 4 MG TABLET    Take 1 tablet by mouth See Admin Instructions Take by mouth. MONTELUKAST (SINGULAIR) 10 MG TABLET    TAKE 1 TABLET BY MOUTH EVERY NIGHT    MULTIPLE VITAMIN (MULTIVITAMIN) TABLET    Take 1 tablet by mouth daily     OXYCODONE-ACETAMINOPHEN (PERCOCET)  MG PER TABLET    1 tablet: TAKE 1 TABLET EVERY 8 HOURS AS NEEDED FOR PAIN    OXYCODONE-ACETAMINOPHEN (PERCOCET)  MG PER TABLET    Take 1 tablet by mouth daily as needed for Pain (severe) for up to 30 days.  Intended supply: 30 days    SODIUM CHLORIDE (OCEAN, BABY AYR) 0.65 % NASAL SPRAY    1 spray by Nasal route every 6 hours as needed (moisturization)    TAMSULOSIN (FLOMAX) 0.4 MG CAPSULE    Take 1 capsule by mouth 2 times daily    VALSARTAN (DIOVAN) 80 MG TABLET    Take 80 mg by mouth daily    VITAMIN C (ASCORBIC ACID) 500 MG TABLET    Take 500 mg by mouth daily     VITAMIN D-3 (CHOLECALCIFEROL) 125 MCG (5000 UT) TABS    Take by mouth         ALLERGIES     Fish oil, No known allergies, Shellfish allergy, and Lisinopril    FAMILYHISTORY       Family History   Problem Relation Age of Onset    Other Brother         anurysim          SOCIAL HISTORY       Social History     Tobacco Use    Smoking status: Never Smoker    Smokeless tobacco: Never Used   Vaping Use    Vaping Use: Never used   Substance Use Topics    Alcohol use: Never    Drug use: Never       SCREENINGS             PHYSICAL EXAM    (up to 7 for level 4, 8 or more for level 5)     ED Triage Vitals   BP Temp Temp src Pulse Resp SpO2 Height Weight   -- -- -- -- -- -- -- --       Physical Exam  Vitals and nursing note reviewed. Constitutional:       Appearance: He is well-developed. He is not diaphoretic. HENT:      Head: Atraumatic. Nose: Nose normal.   Eyes:      General:         Right eye: No discharge. Left eye: No discharge. Cardiovascular:      Rate and Rhythm: Normal rate and regular rhythm. Heart sounds: No murmur heard. No friction rub. No gallop. Pulmonary:      Effort: Pulmonary effort is normal. No respiratory distress. Breath sounds: No stridor. No wheezing, rhonchi or rales. Abdominal:      General: Bowel sounds are normal. There is no distension. Palpations: Abdomen is soft. There is no mass. Tenderness: There is no abdominal tenderness. There is no guarding or rebound. Hernia: No hernia is present. Musculoskeletal:         General: No swelling. Normal range of motion. Cervical back: Normal range of motion. Right lower leg: No edema. Left lower leg: No edema. Skin:     General: Skin is warm and dry. Findings: No erythema or rash. Neurological:      Mental Status: He is alert and oriented to person, place, and time. Cranial Nerves: No cranial nerve deficit. Psychiatric:         Behavior: Behavior normal.         DIAGNOSTIC RESULTS   LABS:    Labs Reviewed   CBC WITH AUTO DIFFERENTIAL - Abnormal; Notable for the following components:       Result Value    RBC 4.17 (*)     Hemoglobin 11.5 (*)     Hematocrit 35.1 (*)     All other components within normal limits    Narrative:     Performed at:  [facility]  [place]   Phone [phone]   COMPREHENSIVE METABOLIC PANEL W/ REFLEX TO MG FOR LOW K - Abnormal; Notable for the following components:    Glucose 125 (*)     CREATININE 0.6 (*)     All other components within normal limits    Narrative:     Performed at:  [facility]  [place]   Phone [phone]   LIPASE    Narrative:     Performed at:  [facility]  [place]   Phone [phone]   TROPONIN    Narrative:     Performed at:  [facility]  [place]   Phone [phone]   BRAIN NATRIURETIC PEPTIDE    Narrative:     Performed at:  [facility]  [place]   Phone [phone]       When ordered only abnormal lab results are displayed. All other labs were within normal range or not returned as of this dictation. EKG: When ordered, EKG's are interpreted by the Emergency Department Physician in the absence of a cardiologist.  Please see their note for interpretation of EKG.     RADIOLOGY:   Non-plain film images such as CT, Ultrasound and MRI are read by the radiologist. Plain radiographic images are visualized and preliminarily interpreted by the ED Provider with the below findings:        Interpretation per the Radiologist below, if available at the time of this note:    CT CHEST PULMONARY EMBOLISM W CONTRAST   Final Result   1. No pulmonary embolism. 2. The lungs are clear. 3. Mild mediastinal and hilar lymph node enlargement of uncertain   significance. 4. Interval T4 kyphoplasty. XR CHEST PORTABLE   Final Result   Probable pattern of mild chronic interstitial change in the chest.  No acute   pulmonary finding. PROCEDURES   Unless otherwise noted below, none     Procedures    CRITICAL CARE TIME   N/A    CONSULTS:  IP CONSULT TO HOSPITALIST      EMERGENCY DEPARTMENT COURSE and DIFFERENTIAL DIAGNOSIS/MDM:   Vitals:    Vitals:    11/03/21 2325 11/03/21 2327   BP: (!) 173/93    Pulse: 61    Resp: 16    Temp:  98 °F (36.7 °C)   TempSrc: Oral    SpO2: 97%        Patient was given the following medications:  Medications   aluminum & magnesium hydroxide-simethicone (MAALOX) 30 mL, lidocaine viscous hcl (XYLOCAINE) 5 mL (GI COCKTAIL) ( Oral Given 11/4/21 0132)   morphine injection 4 mg (4 mg IntraVENous Given 11/4/21 0131)   ondansetron (ZOFRAN) injection 4 mg (4 mg IntraVENous Given 11/4/21 0129)   aspirin chewable tablet 243 mg (243 mg Oral Given 11/4/21 0127)   iopamidol (ISOVUE-370) 76 % injection 75 mL (75 mLs IntraVENous Given 11/4/21 0143)           Patient presented with some chest discomfort that is worse with movement. Has history of hyperlipidemia, hypertension and coronary artery disease. Did recently have a stress test that was unremarkable. Patient does have history of AAA listed on his past medical history but CT just done 2 months ago has no evidence or mention of any kind of aneurysm. CT was obtained due to this which reveals no obvious evidence of aneurysm or PE. Low suspicion for aortic dissection, AAA, acute pancreatitis, perforated viscus or other emergent etiology.  Did discuss this with hospitalist for admission given his chest pain and multiple risk factors. Patient was stable time of admission. FINAL IMPRESSION      1. Acute chest pain          DISPOSITION/PLAN   DISPOSITION Decision To Admit 11/04/2021 02:09:02 AM      PATIENT REFERRED TO:  No follow-up provider specified.     DISCHARGE MEDICATIONS:  New Prescriptions    No medications on file       DISCONTINUED MEDICATIONS:  Discontinued Medications    No medications on file              (Please note that portions of this note were completed with a voice recognition program.  Efforts were made to edit the dictations but occasionally words are mis-transcribed.)    Rory Dancer, PA-C (electronically signed)            Rory Dancer, PA-C  11/04/21 0230

## 2021-11-04 NOTE — PROGRESS NOTES
Comprehensive Nutrition Assessment    Type and Reason for Visit:  Initial    Nutrition Recommendations/Plan:   Ensure Enlive TID when diet advances  Monitor PO and Supplement intake while in hospital    Nutrition Assessment:  MST triggered for weight loss and poor PO intake. Pt has lost 26 pounds in 1 year (16% weight loss in 1 year and an 18% weight loss in 6 months). Pt states that weight loss started when he was getting radiation. He is no longer getting radiation tx. He feels the radiation changed his appetite and taste buds. He follows a ieunt-jfo-vkutaluzyu diet because he can no longer tolerate the taste of meat. I suggested Ensure Enlive and he accepted. He drinks Ensure at home to supplement meals. He is currently NPO. Will start Ensure TID when diet advances. Will continue to monitor PO/Supplement intake while in hospital.    Malnutrition Assessment:  Malnutrition Status: At risk for malnutrition (Comment)    Context:  Chronic Illness     Findings of the 6 clinical characteristics of malnutrition:  Energy Intake:  Mild decrease in energy intake (Comment)  Weight Loss:  7 - Greater than 10% over 6 months     Body Fat Loss:  No significant body fat loss     Muscle Mass Loss:  No significant muscle mass loss    Fluid Accumulation:  No significant fluid accumulation     Strength:  Not Performed    Estimated Daily Nutrient Needs:  Energy (kcal):  1109-5404 (25-30 kcal/76.2 kg); Weight Used for Energy Requirements:  Current     Protein (g):   (1.2-2.0g/76.2 kg);  Weight Used for Protein Requirements:  Current        Fluid (ml/day):   ; Method Used for Fluid Requirements:  1 ml/kcal      Nutrition Related Findings:  , Glucose 237,  last BM 11/3      Wounds:  None       Current Nutrition Therapies:    Diet NPO Exceptions are: Sips of Water with Meds    Anthropometric Measures:  · Height: 5' 9\" (175.3 cm)  · Current Body Weight: 167 lb (75.8 kg)   · Ideal Body Weight: 160 lbs; % Ideal Body Weight 104.4 %   · BMI: 24.7  · BMI Categories: Normal Weight (BMI 18.5-24. 9)       Nutrition Diagnosis:   · Unintended weight loss related to altered taste perception (Radiation) as evidenced by poor intake prior to admission, weight loss greater than or equal to 10% in 6 months      Nutrition Interventions:   Food and/or Nutrient Delivery:  Continue NPO  Nutrition Education/Counseling:  No recommendation at this time   Coordination of Nutrition Care:  Continue to monitor while inpatient    Goals:  PO and Supplement intake 50% or greater       Nutrition Monitoring and Evaluation:   Behavioral-Environmental Outcomes:  None Identified   Food/Nutrient Intake Outcomes:  Food and Nutrient Intake, Supplement Intake  Physical Signs/Symptoms Outcomes:  Weight     Discharge Planning:     Too soon to determine     Electronically signed by Judson Maloney RD, LD on 11/4/21 at 12:30 PM EDT    Contact: [phone]

## 2021-11-04 NOTE — PROGRESS NOTES
[facility]    11/4/2021 5:45 PM        Name: Marii Bee . Admitted: 11/3/2021  Primary Care Provider: Dixie Aguilar MD (Tel: [phone])      Subjective:  Pt denies any chest pain,left arm pain or sob at time of exam.He reports good appetite and no vomiting or diarrhea.       Reviewed interval ancillary notes    Current Medications  atorvastatin (LIPITOR) tablet 40 mg, Nightly  finasteride (PROSCAR) tablet 5 mg, Daily  montelukast (SINGULAIR) tablet 10 mg, Nightly  oxyCODONE-acetaminophen (PERCOCET)  MG per tablet 1 tablet, Daily PRN  tamsulosin (FLOMAX) capsule 0.4 mg, BID  valsartan (DIOVAN) tablet 80 mg, Daily  sodium chloride flush 0.9 % injection 10 mL, 2 times per day  sodium chloride flush 0.9 % injection 10 mL, PRN  0.9 % sodium chloride infusion, PRN  acetaminophen (TYLENOL) tablet 650 mg, Q6H PRN   Or  acetaminophen (TYLENOL) suppository 650 mg, Q6H PRN  magnesium hydroxide (MILK OF MAGNESIA) 400 MG/5ML suspension 30 mL, Daily PRN  nitroGLYCERIN (NITROSTAT) SL tablet 0.4 mg, Q5 Min PRN  ondansetron (ZOFRAN) injection 4 mg, Q6H PRN  heparin (porcine) injection 4,000 Units, PRN  heparin (porcine) injection 2,000 Units, PRN  0.9 % sodium chloride infusion, Continuous  sodium chloride flush 0.9 % injection 5-40 mL, 2 times per day  sodium chloride flush 0.9 % injection 5-40 mL, PRN  0.9 % sodium chloride infusion, PRN  perflutren lipid microspheres (DEFINITY) injection 1.65 mg, ONCE PRN  0.9 % sodium chloride infusion, Continuous  sodium chloride flush 0.9 % injection 5-40 mL, 2 times per day  sodium chloride flush 0.9 % injection 5-40 mL, PRN  0.9 % sodium chloride infusion, PRN  morphine (PF) injection 2 mg, Q3H PRN  metoprolol succinate (TOPROL XL) extended release tablet 25 mg, Daily  [START ON 11/5/2021] aspirin EC tablet 81 mg, Daily  [START ON 11/5/2021] clopidogrel (PLAVIX) tablet 75 mg, Daily        Objective:  /70   Pulse 64   Temp 97.3 °F (36.3 °C) (Axillary)   Resp 16   Ht 5' 9\" (1.753 m)   Wt 167 lb 14.4 oz (76.2 kg)   SpO2 96%   BMI 24.79 kg/m²     Intake/Output Summary (Last 24 hours) at 11/4/2021 1745  Last data filed at 11/4/2021 1200  Gross per 24 hour   Intake 240 ml   Output    Net 240 ml      Wt Readings from Last 3 Encounters:   11/04/21 167 lb 14.4 oz (76.2 kg)   10/15/21 175 lb (79.4 kg)   10/05/21 176 lb (79.8 kg)       General appearance:  Appears comfortable  Eyes: Sclera clear. Pupils equal.  ENT: Moist oral mucosa. Trachea midline, no adenopathy. Cardiovascular: Regular rhythm, normal S1, S2. No murmur. No edema in lower extremities  Respiratory: Not using accessory muscles. Good inspiratory effort. Clear to auscultation bilaterally, no wheeze or crackles. GI: Abdomen soft, no tenderness, not distended, normal bowel sounds  Musculoskeletal: No cyanosis in digits, neck supple  Neurology: CN 2-12 grossly intact. No speech or motor deficits  Psych: Normal affect. Alert and oriented in time, place and person  Skin: Warm, dry, normal turgor    Labs and Tests:  CBC:   Recent Labs     11/03/21 2341 11/04/21  0420   WBC 9.6 10.1   HGB 11.5* 11.7*    293     BMP:    Recent Labs     11/03/21 2341 11/04/21  0420    135*   K 4.1 5.0    100   CO2 21 22   BUN 16 14   CREATININE 0.6* 0.6*   GLUCOSE 125* 237*     Hepatic:   Recent Labs     11/03/21 2341   AST 20   ALT 28   BILITOT 0.3   ALKPHOS 95       Discussed care with patient. Problem List  Active Problems:    Acute chest pain    NSTEMI (non-ST elevated myocardial infarction) ([facility] [address]  Resolved Problems:    * No resolved hospital problems. *       Assessment & Plan:   1. Pt reports his chest pain and back pain were relieved with his percocet medication. He was seen by cardiology (Claudene Dance) earlier and will have a cardiac cath sometime today. Diet: ADULT DIET; Regular;  Low Fat/Low Chol/High Fiber/ROLY  Code:Full Code  DVT PPX:HEPARIN      DEJON NUÑEZ DO   11/4/2021 5:45 PM

## 2021-11-05 NOTE — ED PROVIDER NOTES
I independently performed a history and physical on Giovanni Wick. All diagnostic, treatment, and disposition decisions were made by myself in conjunction with the advanced practice provider. Briefly, this is a 67 y.o. male here for central chest pain ongoing for the past 2 to 3 days. Patient describes the pain to me as \"a hole filling in in my heart. \"  Worsened with exertion, nothing seems to make it better. On exam, patient afebrile and nontoxic. No distress. Heart RRR. Lungs CTAB. Abdomen soft, nondistended, nontender to palpation in all quadrants. EKG  EKG was reviewed by emergency department physician in the absence of a cardiologist    Narrow complex sinus rhythm, rate 63, normal axis, normal OK and QRS intervals, normal Qtc, no ST elevations or depressions, normal t-wave morphology, impression NSR with solitary PAC, no STEMI      Screenings   Jaden Coma Scale  Eye Opening: Spontaneous  Best Verbal Response: Confused  Best Motor Response: Obeys commands  McNeal Coma Scale Score: 14        MDM    Patient afebrile and nontoxic. No distress. EKG is no STEMI, initial troponin normal, ACS is not immediately evident. No malignant dysrhythmia. Given history of metastatic nasopharyngeal cancer, did proceed with CTA imaging which showed no evidence of pulmonary embolism or other acute process. Patient reportedly has history of dissection, however on imaging review this is not apparent. Regardless patient's presentation is clinically not indicative of aneurysm rupture or aortic dissection and my suspicion is extremely low. Patient with multiple cardiac risk factors including history of CAD, given his chest pain will plan for admission for further evaluation and care. Aspirin was given. Patient remained alert, hemodynamically stable and in no distress over his emergency department course. Patient Referrals:  Ngozi West MD  [facility].  [address] [facility] [address] [id number]  [phone]    Call  make a 2 week follow up appointment    Matteo Jimenez, [address] [facility]  [phone]    Call  ask your pcp if he would like a follow up appointment      Discharge Medications:  Current Discharge Medication List          FINAL IMPRESSION  1. Acute chest pain        Blood pressure 131/70, pulse 78, temperature 97.7 °F (36.5 °C), temperature source Oral, resp. rate 16, height 5' 9\" (1.753 m), weight 167 lb 14.4 oz (76.2 kg), SpO2 98 %. For further details of Donna Liu's emergency department encounter, please see documentation by advanced practice provider, RIGOBERTO Guevara.     Tami Leonard DO (electronically signed)  Attending Emergency Physician       Tami Leonard DO  11/04/21 2040

## 2021-11-05 NOTE — PROGRESS NOTES
[facility]    11/5/2021 6:40 PM        Name: Carlita Maradiaga . Admitted: 11/3/2021  Primary Care Provider: Liana Gotti MD (Tel: [phone])      Subjective:  Day 2: Pt reports mid back pain over last 24 hours. He had a cardiac cathether done yesterday and had a stent placed. Cardiology request pt to be kept one more day to evaluate his pain symptoms-cardiac pain versus back pain from his spinal metastasis. Pt eating well and taking fluids. He denies any sob or arm pain.         Reviewed interval ancillary notes    Current Medications  calcium carbonate (TUMS) chewable tablet 500 mg, TID PRN  atorvastatin (LIPITOR) tablet 40 mg, Nightly  finasteride (PROSCAR) tablet 5 mg, Daily  montelukast (SINGULAIR) tablet 10 mg, Nightly  oxyCODONE-acetaminophen (PERCOCET)  MG per tablet 1 tablet, Daily PRN  tamsulosin (FLOMAX) capsule 0.4 mg, BID  valsartan (DIOVAN) tablet 80 mg, Daily  sodium chloride flush 0.9 % injection 10 mL, 2 times per day  sodium chloride flush 0.9 % injection 10 mL, PRN  0.9 % sodium chloride infusion, PRN  acetaminophen (TYLENOL) tablet 650 mg, Q6H PRN   Or  acetaminophen (TYLENOL) suppository 650 mg, Q6H PRN  magnesium hydroxide (MILK OF MAGNESIA) 400 MG/5ML suspension 30 mL, Daily PRN  nitroGLYCERIN (NITROSTAT) SL tablet 0.4 mg, Q5 Min PRN  ondansetron (ZOFRAN) injection 4 mg, Q6H PRN  0.9 % sodium chloride infusion, Continuous  sodium chloride flush 0.9 % injection 5-40 mL, 2 times per day  sodium chloride flush 0.9 % injection 5-40 mL, PRN  0.9 % sodium chloride infusion, PRN  perflutren lipid microspheres (DEFINITY) injection 1.65 mg, ONCE PRN  0.9 % sodium chloride infusion, Continuous  sodium chloride flush 0.9 % injection 5-40 mL, 2 times per day  sodium chloride flush 0.9 % injection 5-40 mL, PRN  0.9 % sodium chloride infusion, PRN  morphine (PF) injection 2 mg, Q3H PRN  metoprolol succinate (TOPROL XL) extended release tablet 25 mg, Daily  aspirin EC tablet 81 mg, Daily  clopidogrel (PLAVIX) tablet 75 mg, Daily        Objective:  /79   Pulse 54   Temp 97.6 °F (36.4 °C) (Oral)   Resp 18   Ht 5' 9\" (1.753 m)   Wt 168 lb 6.4 oz (76.4 kg)   SpO2 96%   BMI 24.87 kg/m²     Intake/Output Summary (Last 24 hours) at 11/5/2021 1840  Last data filed at 11/5/2021 0921  Gross per 24 hour   Intake    Output 1100 ml   Net -1100 ml      Wt Readings from Last 3 Encounters:   11/05/21 168 lb 6.4 oz (76.4 kg)   10/15/21 175 lb (79.4 kg)   10/05/21 176 lb (79.8 kg)       General appearance:  Appears comfortable  Eyes: Sclera clear. Pupils equal.  ENT: Moist oral mucosa. Trachea midline, no adenopathy. Cardiovascular: Regular rhythm, normal S1, S2. No murmur. No edema in lower extremities  Respiratory: Not using accessory muscles. Good inspiratory effort. Clear to auscultation bilaterally, no wheeze or crackles. GI: Abdomen soft, no tenderness, not distended, normal bowel sounds  Musculoskeletal: Tender at mid thoracic back with palpation. Neurology: CN 2-12 grossly intact. No speech or motor deficits  Psych: Normal affect. Alert and oriented in time, place and person  Skin: Warm, dry, normal turgor    Labs and Tests:  CBC:   Recent Labs     11/03/21 2341 11/04/21 0420   WBC 9.6 10.1   HGB 11.5* 11.7*    293     BMP:    Recent Labs     11/03/21 2341 11/04/21  0420 11/05/21  0515    135* 136   K 4.1 5.0 5.0    100 103   CO2 21 22 21   BUN 16 14 15   CREATININE 0.6* 0.6* 0.7*   GLUCOSE 125* 237* 155*     Hepatic:   Recent Labs     11/03/21 2341   AST 20   ALT 28   BILITOT 0.3   ALKPHOS 95       Discussed cardiac cath results with  with Dr. Kishor Dominguez    I  Problem List  Active Problems:    Acute chest pain    NSTEMI (non-ST elevated myocardial infarction) [facility])  Resolved Problems:    * No resolved hospital problems. *       Assessment & Plan:   1.  Non STEMI myocardial infarction  2. Spinal pain from metastatic disease. Will treat metastatic  back pain with with steroids. Decadron 8mg IV q8 hours. Pt to be d/kendy to home tomorrow. Diet: ADULT DIET; Regular; Low Fat/Low Chol/High Fiber/ROLY  ADULT ORAL NUTRITION SUPPLEMENT; Breakfast, Lunch, Dinner; Standard High Calorie/High Protein Oral Supplement  Code:Full Code  DVT PPX:plavix and mechanical stockings.       [address] [place] [address]   11/5/2021 6:40 PM

## 2021-11-05 NOTE — PROGRESS NOTES
[address]   Progress Note  Cardiology      [address]   Admission date:  11/3/2021  CC-f/up PCI of marginal  Subjective:  He had coughing then terrible back pain.  Discussed T4 findings with PET suggesting metastatic lesion but biopsy negative    Objective:  Medications/Labs all Reviewed     atorvastatin  40 mg Oral Nightly    finasteride  5 mg Oral Daily    montelukast  10 mg Oral Nightly    tamsulosin  0.4 mg Oral BID    valsartan  80 mg Oral Daily    sodium chloride flush  10 mL IntraVENous 2 times per day    sodium chloride flush  5-40 mL IntraVENous 2 times per day    sodium chloride flush  5-40 mL IntraVENous 2 times per day    metoprolol succinate  25 mg Oral Daily    aspirin  81 mg Oral Daily    clopidogrel  75 mg Oral Daily       BMP:   Lab Results   Component Value Date     11/05/2021    K 5.0 11/05/2021    K 5.0 11/04/2021     11/05/2021    CO2 21 11/05/2021    BUN 15 11/05/2021    CREATININE 0.7 11/05/2021    CREATININE 0.9 07/02/2020    MG 2.10 09/17/2021     CBC:    Lab Results   Component Value Date    WBC 10.1 11/04/2021    RBC 4.27 11/04/2021    HGB 11.7 11/04/2021    HCT 35.2 11/04/2021    MCV 82.4 11/04/2021    RDW 14.2 11/04/2021     11/04/2021      PT/INR:    Lab Results   Component Value Date    INR 1.20 (H) 09/15/2021    PROTIME 13.7 (H) 09/15/2021     Cardiac Enzymes:  No results found for: CKTOTAL, CKMB, CKMBINDEX  Lab Results   Component Value Date    TROPONINI 0.22 (H) 11/04/2021    TROPONINI 0.18 (H) 11/04/2021    TROPONINI <0.01 11/03/2021     BNP:  No results found for: BNP  FASTING LIPID PANEL:    Lab Results   Component Value Date    CHOL 152 03/29/2021    HDL 31 03/29/2021    TRIG 142 03/29/2021       Physical Examination:    /85   Pulse 67   Temp 97.6 °F (36.4 °C) (Oral)   Resp 18   Ht 5' 9\" (1.753 m)   Wt 168 lb 6.4 oz (76.4 kg)   SpO2 97%   BMI 24.87 kg/m²      Respiratory:  · Resp Assessment: Normal respiratory effort  · Resp Auscultation: Clear to auscultation bilaterally   Cardiovascular:  · Auscultation: regular rate and rhythm, normal S1S2, no murmur, rub or gallop  · Palpation:  Nl PMI  · JVP:  normal  · Extremities: No Edema  Abdomen:  · Soft, non-tender  · Normal bowel sounds  Extremities:  ·  No Cyanosis or Clubbing  Neurological/Psychiatric:  · Oriented to time, place, and person  · Non-anxious  Skin Warm and dry    Assessment:    Active Problems:    Acute chest pain      NSTEMI (non-ST elevated myocardial infarction) ([facility] [address]  Plan: cath with severe marginal stenosis ,also small nondominant RCA occluded    Echo reviewed      Plan:  1. Discussed with Dr Refugia Najjar need pain management for T4 compression  2. Cardiac wise will increase toprol XL to 50 mg daily and use nitro sl prn. He has appointment with Dr Isabella Guerra next week. 3. Also discussed with Dr Bella Hopper for oncology f/up. Dr Bella Hopper will arrange  4.  Likely home in am  I have spent  35  minutes of face to face time with the patient with more than 50%  spent  counseling and coordinating care for Sasha Bhatti

## 2021-11-05 NOTE — DISCHARGE INSTR - COC
Continuity of Care Form    Patient Name: Shira Brooks   :  1945  MRN:  [id number]    Admit date:  11/3/2021  Discharge date:  ***    Code Status Order: Full Code   Advance Directives:      Admitting Physician:  Shelly Ramirez MD  PCP: Aurelia Rubin MD    Discharging Nurse: [facility] Unit/Room#: 3AN-3321/3321-01  Discharging Unit Phone Number: ***    Emergency Contact:   Extended Emergency Contact Information  Primary Emergency Contact: Gorgeice Zheng  Home Phone: [phone]  Relation: Spouse    Past Surgical History:  Past Surgical History:   Procedure Laterality Date    CARDIAC SURGERY  2017    stents placed     COLONOSCOPY      IR KYPHOPLASTY THORACIC FIRST LEVEL  2021    IR KYPHOPLASTY THORACIC FIRST LEVEL 2021 [facility] SPECIAL PROCEDURES    SINUS ENDOSCOPY N/A 2020    FUNCTIONAL ENDOSCOPIC SINUS SURGERY performed by Man Mathews MD at [facility] 2021    RIGHT FUNCTIONAL ENDOSCOPIC SINUS SURGERY (99151, 28156, 88243) performed by Man Mathews MD at [address] [facility] N/A 2021    EGD GASTRIC BIOPSY performed by Tawny Smalls MD at [address] [facility] ENDOSCOPY       Immunization History:   Immunization History   Administered Date(s) Administered    Tdap (Boostrix, Adacel) 2019       Active Problems:  Patient Active Problem List   Diagnosis Code    Subacute pansinusitis J01.40    Nasal polyposis J33.9    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, C31.9    Abdominal aortic aneurysm (AAA) without rupture (HCC) I71.4    Uncontrolled pain R52    Pathologic thoracic fracture, initial encounter M84.48XA    Hypertensive urgency I16.0    Spine metastasis ([facility] [address] C79.51    Bandemia D72.825    Hyponatremia E87.1    Coronary artery disease due to lipid rich plaque I25.10, I25.83    Fatty liver disease, nonalcoholic S16.1    Essential hypertension I10    Hyperlipidemia E78.5    Acute chest pain R07.9    NSTEMI (non-ST elevated myocardial infarction) (HCC) I21.4       Isolation/Infection:   Isolation            No Isolation          Patient Infection Status       None to display            Nurse Assessment:  Last Vital Signs: /85   Pulse 67   Temp 97.6 °F (36.4 °C) (Oral)   Resp 18   Ht 5' 9\" (1.753 m)   Wt 168 lb 6.4 oz (76.4 kg)   SpO2 97%   BMI 24.87 kg/m²     Last documented pain score (0-10 scale): Pain Level: 7  Last Weight:   Wt Readings from Last 1 Encounters:   11/05/21 168 lb 6.4 oz (76.4 kg)     Mental Status:  {IP PT MENTAL STATUS:20030}    IV Access:  { STEVIE IV ACCESS:294900855}    Nursing Mobility/ADLs:  Walking   {P DME GNAE:510511248}  Transfer  {P DME TWHH:390111680}  Bathing  {P DME CYTL:515984638}  Dressing  {P DME LKBE:661605532}  Toileting  {P DME SBTW:785106161}  Feeding  {[facility] DME ZRRY:706553935}  Med Admin  {[facility] DME JTBB:171880185}  Med Delivery   { STEVIE MED Delivery:443529800}    Wound Care Documentation and Therapy:  Wound 09/16/21 Back Right;Medial;Upper (Active)   Number of days: 49        Elimination:  Continence: Bowel: {YES / UP:17110}  Bladder: {YES / ZV:59080}  Urinary Catheter: {Urinary Catheter:954248417}   Colostomy/Ileostomy/Ileal Conduit: {YES / XS:55831}       Date of Last BM: ***    Intake/Output Summary (Last 24 hours) at 11/5/2021 1238  Last data filed at 11/5/2021 0921  Gross per 24 hour   Intake    Output 1500 ml   Net -1500 ml     I/O last 3 completed shifts:   In: 240 [P.O.:240]  Out: 1350 [Urine:1350]    Safety Concerns:     [address] Safety Concerns:664390261}    Impairments/Disabilities:      [address] Impairments/Disabilities:792886869}    Nutrition Therapy:  Current Nutrition Therapy:   [address] Diet List:214885467}    Routes of Feeding: {CHP DME Other Feedings:307580603}  Liquids: {Slp liquid thickness:21524}  Daily Fluid Restriction: {CHP DME Yes amt example:374856158}  Last Modified Barium Swallow with Video (Video Swallowing Test): {Done Not Done GLFZ:991377700}    Treatments at the Time of Hospital Discharge:   Respiratory Treatments: ***  Oxygen Therapy:  {Therapy; copd oxygen:03625}  Ventilator:    { CC Vent YQUM:095627666}    Rehab Therapies: SN,PT,OT,HHA  Weight Bearing Status/Restrictions: { CC Weight Bearin}  Other Medical Equipment (for information only, NOT a DME order):  {EQUIPMENT:589789807}  Other Treatments: ***    Patient's personal belongings (please select all that are sent with patient):  {CHP DME Belongings:888382514}    RN SIGNATURE:  {Esignature:211972056}    CASE MANAGEMENT/SOCIAL WORK SECTION    Inpatient Status Date: ***    Readmission Risk Assessment Score:  Readmission Risk              Risk of Unplanned Readmission:  0           Discharging to Facility/ Agency   Name: Zeke Morillo will call for Appointment  Phone: [phone]  Fax: [phone]     Dialysis Facility (if applicable)   Name:  Address:  Dialysis Schedule:  Phone:  Fax:    / signature: {Esignature:926543427}    PHYSICIAN SECTION    Prognosis:good    Condition at Discharge: stable    Rehab Potential (if transferring to Rehab):good    Recommended Labs or Other Treatments After Discharge: ***    Physician Certification: I certify the above information and transfer of Luisa Nair  is necessary for the continuing treatment of the diagnosis listed and that he requires home care for  30 days.      Update Admission H&P:updated    PHYSICIAN SIGNATURE:  Rodney Liz MD on 21

## 2021-11-05 NOTE — PROGRESS NOTES
Pt states he is having acute 10/10 mid chest sharp pain radiates to back after coughing. 2 L NC applied. STAT ekg ordered. Cardiology notified.

## 2021-11-06 NOTE — PROGRESS NOTES
[facility]    11/6/2021 8:50 AM        Name: Porter Lundborg . Admitted: 11/3/2021  Primary Care Provider: Alexandria Swanson MD (Tel: [phone])      Subjective:  Pt reports still having back and chest pain. He says decadron did not help much. Appetite good denies cough,sob or vomiting.         Reviewed interval ancillary notes    Current Medications  [START ON 11/7/2021] carvedilol (COREG) tablet 6.25 mg, BID WC  calcium carbonate (TUMS) chewable tablet 500 mg, TID PRN  oxyCODONE-acetaminophen (PERCOCET)  MG per tablet 1 tablet, BID PRN  atorvastatin (LIPITOR) tablet 40 mg, Nightly  finasteride (PROSCAR) tablet 5 mg, Daily  montelukast (SINGULAIR) tablet 10 mg, Nightly  tamsulosin (FLOMAX) capsule 0.4 mg, BID  valsartan (DIOVAN) tablet 80 mg, Daily  sodium chloride flush 0.9 % injection 10 mL, 2 times per day  sodium chloride flush 0.9 % injection 10 mL, PRN  0.9 % sodium chloride infusion, PRN  acetaminophen (TYLENOL) tablet 650 mg, Q6H PRN   Or  acetaminophen (TYLENOL) suppository 650 mg, Q6H PRN  magnesium hydroxide (MILK OF MAGNESIA) 400 MG/5ML suspension 30 mL, Daily PRN  nitroGLYCERIN (NITROSTAT) SL tablet 0.4 mg, Q5 Min PRN  ondansetron (ZOFRAN) injection 4 mg, Q6H PRN  0.9 % sodium chloride infusion, Continuous  sodium chloride flush 0.9 % injection 5-40 mL, 2 times per day  sodium chloride flush 0.9 % injection 5-40 mL, PRN  0.9 % sodium chloride infusion, PRN  perflutren lipid microspheres (DEFINITY) injection 1.65 mg, ONCE PRN  0.9 % sodium chloride infusion, Continuous  sodium chloride flush 0.9 % injection 5-40 mL, 2 times per day  sodium chloride flush 0.9 % injection 5-40 mL, PRN  0.9 % sodium chloride infusion, PRN  morphine (PF) injection 2 mg, Q3H PRN  aspirin EC tablet 81 mg, Daily  clopidogrel (PLAVIX) tablet 75 mg, Daily        Objective:  BP (!) 169/89   Pulse 76   Temp 98 °F (36.7 °C) (Oral)   Resp 18   Ht 5' 9\" (1.753 m)   Wt 168 lb 6.4 oz (76.4 kg)   SpO2 96%   BMI 24.87 kg/m²     Intake/Output Summary (Last 24 hours) at 11/6/2021 0850  Last data filed at 11/6/2021 0550  Gross per 24 hour   Intake    Output 1250 ml   Net -1250 ml      Wt Readings from Last 3 Encounters:   11/05/21 168 lb 6.4 oz (76.4 kg)   10/15/21 175 lb (79.4 kg)   10/05/21 176 lb (79.8 kg)       General appearance:  Appears comfortable  Eyes: Sclera clear. Pupils equal.  ENT: Moist oral mucosa. Trachea midline, no adenopathy. Cardiovascular: Regular rhythm, normal S1, S2. No murmur. No edema in lower extremities  Respiratory: Not using accessory muscles. Good inspiratory effort. Clear to auscultation bilaterally, no wheeze or crackles. GI: Abdomen soft, no tenderness, not distended, normal bowel sounds  Musculoskeletal:tender at thoracic spine  Neurology: CN 2-12 grossly intact. No speech or motor deficits  Psych: Normal affect. Alert and oriented in time, place and person  Skin: Warm, dry, normal turgor    Labs and Tests:  CBC:   Recent Labs     11/03/21 2341 11/04/21  0420 11/06/21  0443   WBC 9.6 10.1 18.3*   HGB 11.5* 11.7* 11.9*    293 299     BMP:    Recent Labs     11/03/21  2341 11/04/21  0420 11/05/21  0515    135* 136   K 4.1 5.0 5.0    100 103   CO2 21 22 21   BUN 16 14 15   CREATININE 0.6* 0.6* 0.7*   GLUCOSE 125* 237* 155*     Hepatic:   Recent Labs     11/03/21  2341   AST 20   ALT 28   BILITOT 0.3   ALKPHOS 95           Active Problems:    Acute chest pain    NSTEMI (non-ST elevated myocardial infarction) ([facility] [address]  Resolved Problems:    * No resolved hospital problems. *       Assessment & Plan:   1. Non STEMI MI  2. leukocystosis  3. Back pain from metastatic disease    PLAN: I spoke with cardiology NP and discussed with her about starting pt on daily nitrites to see if this will              ease his chest pain and back pain since he has does have proven CAD.  She was ordered nitro patch for his chest pain. Pt will be d/kendy home tomorrow after nitro patch started to see how he responds. Pt has leukocytosis today which I think is from his decadron injections for his metastatic bone pain. Pt requests lidoderm patches for his back pain. He says this has helped in back pain int eh past.    Diet: ADULT DIET;  Regular; Low Fat/Low Chol/High Fiber/ROLY  ADULT ORAL NUTRITION SUPPLEMENT; Breakfast, Lunch, Dinner; Standard High Calorie/High Protein Oral Supplement  Code:Full Code  DVT PPX: on plavix      DEJON NUÑEZ DO   11/6/2021 8:50 AM

## 2021-11-06 NOTE — PROGRESS NOTES
[address]   Cardiology Progress Note   Date: 11/6/2021  Admit Date: 11/3/2021     Reason for follow up: CP, NSTEMI    Chief Complaint:   Chief Complaint   Patient presents with    Chest Pain     pt states that he has had sharp chest pain for the past two days feels like his heart is closing up. pt states he has been coughing and nauseous      History of Present Illness: History obtained from patient and medical record. Vivian Schumacher is a 67 y.o. male with a past medical history of hypertension, hyperlipidemia, AAA, CAD s/p stents in 2017 who presented with CP. S/p [facility] 11/4/2021 with PCI to OM. Interval Hx: Today, he is being seen for follow up. He was kept overnight due to complaints of chest and back pain. He continues with significant pain in his back. He has difficult time describing pain stating it hurts all over, however does admit to radiating CP. No significant SOB. No new complaints today. No major events overnight. Denies having  palpitations, shortness of breath, orthopnea or dizziness. Patient seen and examined. Clinical notes reviewed. Telemetry reviewed. Assessment and Plan:  CAD/NSTEMI   - S/p [facility] 11/4/2021 with PCI to OM   - Continue BB, DAPT and statin   - Continues with generalized pain and also chest discomfort.   - Repeat trop, ECG   Hypertension   - Uncontrolled, likely exacerbated by acute pain   - Switch to Coreg   Hyperlipidemia   - Statin   - LFT OK , needs repeat lipids as OP   Spinal Pain   - Evidence of metastatic disease    - On steroids per primary team    Troponin/ECG today; Possibly home later today if ECG and troponin improved  Switch to Coreg for better BP control  Scheduled OP follow up 11/10 with Dr. Malik Kovacs    All pertinent information and plan of care discussed with the rounding/attending cardiologist.    Multiple medical conditions with risk of decompensation. All questions and concerns were addressed to the patient.  Alternatives to my treatment were discussed. I have discussed the above stated plan with patient and the nurse. The patient verbalized understanding and agreed with the plan. Thank you for allowing to us to participate in the care of [facility]. 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[facility] [address]     Bandemia     Hyponatremia     Coronary artery disease due to lipid rich plaque     Fatty liver disease, nonalcoholic     Essential hypertension     Hyperlipidemia     Uncontrolled pain 09/07/2021    Abdominal aortic aneurysm (AAA) without rupture ([facility] [address] 06/01/2021    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 Authorizing Provider   oxyCODONE-acetaminophen (PERCOCET)  MG per tablet Take 1 tablet by mouth daily as needed for Pain (severe) for up to 30 days.  Intended supply: 30 days Yes Jannie Reyes MD   oxyCODONE-acetaminophen (PERCOCET)  MG per tablet 1 tablet: TAKE 1 TABLET EVERY 8 HOURS AS NEEDED FOR PAIN Yes Historical Provider, MD   finasteride (PROSCAR) 5 MG tablet Take 1 tablet by mouth daily Yes Raymundo VILLASENOR MD   tamsulosin (FLOMAX) 0.4 MG capsule Take 1 capsule by mouth 2 times daily Yes Raymundo VILLASENOR MD   montelukast (SINGULAIR) 10 MG tablet TAKE 1 TABLET BY MOUTH EVERY NIGHT Yes Attila Alvarado,    atorvastatin (LIPITOR) 40 MG tablet Take 40 mg by mouth nightly  Yes Historical Provider, MD   valsartan (DIOVAN) 80 MG tablet Take 80 mg by mouth daily Yes Historical Provider, MD Multiple Vitamin (MULTIVITAMIN) tablet Take 1 tablet by mouth daily  Yes Historical Provider, MD   Magnesium 400 MG CAPS Take 400 mg by mouth daily  Yes Historical Provider, MD   vitamin D-3 (CHOLECALCIFEROL) 125 MCG (5000 UT) TABS Take by mouth Yes Historical Provider, MD   vitamin C (ASCORBIC ACID) 500 MG tablet Take 500 mg by mouth daily  Yes Historical Provider, MD   sodium chloride (OCEAN, BABY AYR) 0.65 % nasal spray 1 spray by Nasal route every 6 hours as needed (moisturization)  Raymundo VILLASENOR MD   methylPREDNISolone (MEDROL, YANA,) 4 MG tablet Take 1 tablet by mouth See Admin Instructions Take by mouth. Patient not taking: Reported on 6/1/2021  Angelito Stafford MD   methylPREDNISolone (MEDROL, YANA,) 4 MG tablet Take 1 tablet by mouth See Admin Instructions Take by mouth.   Patient not taking: Reported on 5/25/2021  Angelito Stafford MD      Scheduled Meds:  [facility] atorvastatin  40 mg Oral Nightly    finasteride  5 mg Oral Daily    montelukast  10 mg Oral Nightly    tamsulosin  0.4 mg Oral BID    valsartan  80 mg Oral Daily    sodium chloride flush  10 mL IntraVENous 2 times per day    sodium chloride flush  5-40 mL IntraVENous 2 times per day    sodium chloride flush  5-40 mL IntraVENous 2 times per day    metoprolol succinate  25 mg Oral Daily    aspirin  81 mg Oral Daily    clopidogrel  75 mg Oral Daily     Continuous Infusions:   sodium chloride      sodium chloride Stopped (11/04/21 1702)    sodium chloride      sodium chloride 75 mL/hr at 11/06/21 1741    sodium chloride       PRN Meds:calcium carbonate, oxyCODONE-acetaminophen, sodium chloride flush, sodium chloride, acetaminophen **OR** acetaminophen, magnesium hydroxide, nitroGLYCERIN, ondansetron, sodium chloride flush, sodium chloride, perflutren lipid microspheres, sodium chloride flush, sodium chloride, morphine     Past Medical History:  Past Medical History:   Diagnosis Date    AAA (ab[facility] aneurysm) ([facility] [address]     CAD (coronary artery disease)     stents 2017    Cancer ([facility] [address]     Hyperlipidemia     Hypertension     Nasal bleeding     worse since covid making the mask mandatory        Past Surgical History:    has a past surgical history that includes Cardiac surgery (2017); Colonoscopy; Sinus endoscopy (N/A, 11/2/2020); Sinus endoscopy (Right, 4/12/2021); Upper gastrointestinal endoscopy (N/A, 9/8/2021); and IR KYPHOPLASTY THORACIC 1 VERTEBRAL BODY (9/16/2021). Social History:  Reviewed. reports that he has never smoked. He has never used smokeless tobacco. He reports current drug use. Drug: Marijuana Estil Catena). He reports that he does not drink alcohol. Family History:  Reviewed. family history includes Other in his brother. Denies family history of sudden cardiac death, arrhythmia, premature CAD    Review of Systems:  · Constitutional: Negative for fever, weight changes, or weakness  · Skin: Negative for bruising, bleeding, blood clots, or changes in skin pigment  · HEENT: Negative for vision changes or dysphagia  · Respiratory: Reviewed in HPI  · Cardiovascular: Reviewed in HPI  · Gastrointestinal: Negative for abdominal pain or black/tarry stools  · Genito-Urinary: Negative for hematuria  · Musculoskeletal: No focal weakness  · Neurological/Psych: Negative for confusion or TIA-like symptoms.       Physical Examination:  Vitals:    11/06/21 0805   BP: (!) 169/89   Pulse: 76   Resp: 18   Temp: 98 °F (36.7 °C)   SpO2: 96%      In: -   Out: 900    Wt Readings from Last 3 Encounters:   11/05/21 168 lb 6.4 oz (76.4 kg)   10/15/21 175 lb (79.4 kg)   10/05/21 176 lb (79.8 kg)       Intake/Output Summary (Last 24 hours) at 11/6/2021 0817  Last data filed at 11/6/2021 0550  Gross per 24 hour   Intake    Output 1250 ml   Net -1250 ml     Telemetry: Personally Reviewed Normal sinus rhythm  · Constitutional: Cooperative and in no apparent distress, and appears ill  · Skin: Warm and pink; no cyanosis, bruising, or clubbing  · HEENT: Symmetric and normocephalic. Conjunctiva pink with clear sclera. Mucus membranes pink and moist.   · Cardiovascular: regular and rhythm. S1 & S2, negative for murmurs. Peripheral pulses 2+, capillary refill < 3 seconds. negative elevation of JVP. No edema  · Respiratory: Respirations symmetric and unlabored. Lungs clear to auscultation bilaterally, no wheezing, crackles, or rhonchi  · Gastrointestinal: Abdomen soft and round. Bowel sounds normoactive in all quadrants. · Musculoskeletal: No focal weakness. · Neurologic/Psych: Awake and orientated to person, place and time. Appears uncomfortable and agitated    Pertinent labs, diagnostic, device, and imaging results reviewed as a part of this visit    Labs:    BMP:   Recent Labs     21  0515    135* 136   K 4.1 5.0 5.0    100 103   CO2 21 22 21   BUN 16 14 15   CREATININE 0.6* 0.6* 0.7*     Estimated Creatinine Clearance: 90 mL/min (A) (based on SCr of 0.7 mg/dL (L)).    CBC:   Recent Labs     21  0443   WBC 9.6 10.1 18.3*   HGB 11.5* 11.7* 11.9*   HCT 35.1* 35.2* 35.4*   MCV 84.1 82.4 81.5    293 299     Thyroid: No results found for: TSH, K1TESUB, U0UIGMQ, THYROIDAB  Lipids:   Lab Results   Component Value Date    CHOL 152 2021    HDL 31 2021    TRIG 142 2021     LFTS:   Lab Results   Component Value Date    ALT 28 2021    AST 20 2021    ALKPHOS 95 2021    PROT 6.6 2021    AGRATIO 1.4 2021    BILITOT 0.3 2021     Cardiac Enzymes:   Lab Results   Component Value Date    TROPONINI 0.22 2021    TROPONINI 0.18 2021    TROPONINI <0.01 2021     Coags:   Lab Results   Component Value Date    PROTIME 13.7 09/15/2021    INR 1.20 09/15/2021     EC2021: Normal sinus rhythm  Left axis deviation  ST & T wave abnormality, consider inferior ischemia  ST & T wave abnormality, consider anterolateral ischemia  Prolonged QT    ECHO:  2021  Summary   -Left ventricular cavity size is normal.   -Ejection fraction is visually estimated to be 45-50%. -The apical septum and basal inferoseptum and apical inferior wall appear   akinetic.   -Grade I diastolic dysfunction with normal LV filling pressures. E/e' = 9.0.   -Normal function of all valves. Stress Test: none this admission    Cardiac Cath LV2021  Anatomy:   LM-distal 20%  LAD-mid stent patent distal 60%  Cx-dominant, mid 50%  OM- prox 99% ISR  RCA-small non dom  LPDA- nml  LVEF- 45%  LVG- inferoapical hypokinesis  LVEDP- 2     Intervention  ~Successful PCI to OM with 2.75x12 MARIANNA to 20atm.  Excellent Result.      Contrast: 76  Flouro Time: 5.3  Access: R radial a     Impression  ~Coronary Angiography w/ severe single vessel CAD  ~LVG with LVEF of 45 and inferoapical regional wall motion abnormalities  ~Successful complex angioplasty and stenting of OM    Shine Haider APRN-CNP  [facility]   Office: [phone]

## 2021-11-07 NOTE — DISCHARGE SUMMARY
Physician Discharge Summary     Patient ID:  Antonio Rubin  [id number]  87 y.o.  1945    Admit date: 11/3/2021    Discharge date and time: 11/7/2021  2:10 PM     Admitting Physician: Kayla Bennett MD     Discharge Physician: Sherrie Penaloza MD    Admission Diagnoses: Chest pain [R07.9]  Acute chest pain [R07.9]    Discharge Diagnoses: CAD/NSTEMI S/p [facility] 11/4/2021 with PCI to OM    Admission Condition: fair    Discharged Condition: good    Indication for Admission: Chest pain     Hospital Course: Antonio Rubin is a 67 y.o. male with a past medical history of hypertension, hyperlipidemia, AAA, CAD s/p stents in 2017 who presented with CP. S/p [facility] 11/4/2021 with PCI to OM. Pt had leukocytosis which thought to be from his decadron injections for his metastatic bone pain. Discussed T4 findings with PET suggesting metastatic lesion but biopsy negative. Scheduled OP follow up 11/10 with Dr. Sherie Mueller. Discharged on po decadron for pain control. Follows up with Dr. Manju Blackwood. [facility] orders placed.      Consults: cardiology    Discharge Exam:  /61   Pulse 62   Temp 97.4 °F (36.3 °C) (Oral)   Resp 18   Ht 5' 9\" (1.753 m)   Wt 168 lb 6.4 oz (76.4 kg)   SpO2 94%   BMI 24.87 kg/m²     General Appearance:    Alert, cooperative, no distress, appears stated age   Head:    Normocephalic, without obvious abnormality, atraumatic   Eyes:    PERRL, conjunctiva/corneas clear, EOM's intact, fundi     benign, both eyes        Ears:    Normal TM's and external ear canals, both ears   Nose:   Nares normal, septum midline, mucosa normal, no drainage    or sinus tenderness   Throat:   Lips, mucosa, and tongue normal; teeth and gums normal   Neck:   Supple, symmetrical, trachea midline, no adenopathy;        thyroid:  No enlargement/tenderness/nodules; no carotid    bruit or JVD   Back:     Symmetric, no curvature, ROM normal, no CVA tenderness   Lungs:     Clear to auscultation bilaterally, respirations unlabored   Chest wall:    No tenderness or deformity   Heart:    Regular rate and rhythm, S1 and S2 normal, no murmur, rub   or gallop   Abdomen:     Soft, non-tender, bowel sounds active all four quadrants,     no masses, no organomegaly   Genitalia:    Normal male without lesion, discharge or tenderness   Rectal:    Normal tone, normal prostate, no masses or tenderness;    guaiac negative stool   Extremities:   Extremities normal, atraumatic, no cyanosis or edema   Pulses:   2+ and symmetric all extremities   Skin:   Skin color, texture, turgor normal, no rashes or lesions   Lymph nodes:   Cervical, supraclavicular, and axillary nodes normal   Neurologic:   CNII-XII intact. Normal strength, sensation and reflexes       throughout       Disposition: home    In process/preliminary results:  Outstanding Order Results     No orders found from 10/5/2021 to 11/4/2021. Patient Instructions:   Discharge Medication List as of 11/7/2021  1:08 PM      START taking these medications    Details   aspirin 81 MG EC tablet Take 1 tablet by mouth daily, Disp-30 tablet, R-3Normal      nitroGLYCERIN (NITROSTAT) 0.4 MG SL tablet up to max of 3 total doses.  If no relief after 1 dose, call 911., Disp-25 tablet, R-3Normal      calcium carbonate (TUMS) 500 MG chewable tablet Take 1 tablet by mouth 3 times daily as needed for Heartburn, Disp-60 tablet, R-1Normal      carvedilol (COREG) 6.25 MG tablet Take 1 tablet by mouth 2 times daily (with meals), Disp-60 tablet, R-3Normal      lidocaine 4 % external patch Place 1 patch onto the skin daily for 4 doses, TransDERmal, DAILY Starting Mon 11/8/2021, Until Fri 11/12/2021, For 4 doses, Disp-10 patch, R-5, Normal      clopidogrel (PLAVIX) 75 MG tablet Take 1 tablet by mouth daily, Disp-30 tablet, R-3Normal      dexamethasone (DECADRON) 6 MG tablet Take 1 tablet by mouth 2 times daily (with meals) for 14 days, Disp-28 tablet, R-1Normal         CONTINUE these medications which have NOT CHANGED Details   !! oxyCODONE-acetaminophen (PERCOCET)  MG per tablet Take 1 tablet by mouth daily as needed for Pain (severe) for up to 30 days. Intended supply: 30 days, Disp-30 tablet, R-0Normal      !! oxyCODONE-acetaminophen (PERCOCET)  MG per tablet 1 tablet: TAKE 1 TABLET EVERY 8 HOURS AS NEEDED FOR PAINHistorical Med      finasteride (PROSCAR) 5 MG tablet Take 1 tablet by mouth daily, Disp-30 tablet, R-3Normal      tamsulosin (FLOMAX) 0.4 MG capsule Take 1 capsule by mouth 2 times daily, Disp-30 capsule, R-3Normal      sodium chloride (OCEAN, BABY AYR) 0.65 % nasal spray 1 spray by Nasal route every 6 hours as needed (moisturization), Disp-1 each, R-1Normal      montelukast (SINGULAIR) 10 MG tablet TAKE 1 TABLET BY MOUTH EVERY NIGHT, Disp-90 tablet, R-3**Patient requests 90 days supply**Normal      atorvastatin (LIPITOR) 40 MG tablet Take 40 mg by mouth nightly Historical Med      valsartan (DIOVAN) 80 MG tablet Take 80 mg by mouth dailyHistorical Med      Multiple Vitamin (MULTIVITAMIN) tablet Take 1 tablet by mouth daily Historical Med      Magnesium 400 MG CAPS Take 400 mg by mouth daily Historical Med      vitamin D-3 (CHOLECALCIFEROL) 125 MCG (5000 UT) TABS Take by mouthHistorical Med      vitamin C (ASCORBIC ACID) 500 MG tablet Take 500 mg by mouth daily Historical Med       !! - Potential duplicate medications found. Please discuss with provider. STOP taking these medications       methylPREDNISolone (MEDROL, YANA,) 4 MG tablet Comments:   Reason for Stopping:         methylPREDNISolone (MEDROL, YANA,) 4 MG tablet Comments:   Reason for Stopping:             Activity: activity as tolerated  Diet: cardiac diet  Wound Care: none needed    Follow-up with pcp, cardiology, Oncology in 2 weeks.     Signed:  Sp Chapman MD  Time spent > 35 mins  11/7/2021  3:30 PM

## 2021-11-07 NOTE — CARE COORDINATION
Home Care Order noted. Patient active with [facility] prior to admission for Skilled Nursing only. DIANE faxed discharge information to [facility].      Electronically signed by MARYURI Khan on 11/7/2021 at 1:26 PM

## 2021-11-07 NOTE — PROGRESS NOTES
This RN reviewed discharge paperwork with pt and his wife, all questions answered. Pt satisfied. Both PIV's taken out. Tele taken off.

## 2021-11-07 NOTE — PROGRESS NOTES
[facility]   Cardiology Progress Note   Date: 11/7/2021  Admit Date: 11/3/2021     Reason for follow up: CP, NSTEMI    Chief Complaint:   Chief Complaint   Patient presents with    Chest Pain     pt states that he has had sharp chest pain for the past two days feels like his heart is closing up. pt states he has been coughing and nauseous      History of Present Illness: History obtained from patient and medical record. Rosendo Penn is a 67 y.o. male with a past medical history of hypertension, hyperlipidemia, AAA, CAD s/p stents in 2017 who presented with CP. S/p [facility] 11/4/2021 with PCI to OM. He was kept overnight due to complaints of chest and back pain. He continues with significant pain in his back. He has difficult time describing pain stating it hurts all over, however does admit to radiating CP. Repeat troponin was significantly improved and ECG was done. Interval Hx: Today, he is being seen for follow up. Admits to sternal constant throbbing pain that is worse when coughing or deep breaths. It does not worsen with exertion or ambulation. Accompanied by spinal pain. No significant change with nitro paste overnight. No new complaints today. No major events overnight. Denies having palpitations, shortness of breath, orthopnea, or dizziness. Patient seen and examined. Clinical notes reviewed. Telemetry reviewed. Assessment and Plan:  CAD/NSTEMI   - S/p [facility] 11/4/2021 with PCI to OM   - Continue BB, DAPT and statin   - Atypical constant sternal pain not worsened with exertion, improved with nitroglycerin, and worsened by cough and deep breaths   - Repeat trop were improved.   ECG was stable, reviewed with Dr. Edwin Rodriguez   Hypertension   - Controlled on Coreg  Hyperlipidemia   - Statin   - LFT OK , needs repeat lipids as OP   Spinal Pain   - Evidence of metastatic disease    - On steroids per primary team    Troponin significantly improved, nitropaste did not significantly help his CP, reviewed ECG with Dr. Itzel Bergeron and he is OK for discharge from CV perspective  BP is more controlled  Scheduled OP follow up 11/10 with Dr. Jc Andrea    All pertinent information and plan of care discussed with the rounding/attending cardiologist.    Multiple medical conditions with risk of decompensation. All questions and concerns were addressed to the patient. Alternatives to my treatment were discussed. I have discussed the above stated plan with patient and the nurse. The patient verbalized understanding and agreed with the plan. Thank you for allowing to us to participate in the care of Yusef Hernandez. 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[facility] [address]     Bandemia     Hyponatremia     Coronary artery disease due to lipid rich plaque     Fatty liver disease, nonalcoholic     Essential hypertension     Hyperlipidemia     Uncontrolled pain 09/07/2021    Abdominal aortic aneurysm (AAA) without rupture ([facility] [address] 06/01/2021    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 Authorizing Provider   oxyCODONE-acetaminophen (PERCOCET)  MG per tablet Take 1 tablet by mouth daily as needed for Pain (severe) for up to 30 days.  Intended supply: 30 days Yes Armando Hobbs MD   oxyCODONE-acetaminophen (PERCOCET)  MG per tablet 1 tablet: TAKE 1 TABLET EVERY 8 HOURS AS NEEDED FOR PAIN Yes Historical Provider, MD   finasteride (PROSCAR) 5 MG tablet Take 1 tablet by mouth daily Yes Ramyundo Jose Angel Ram MD   tamsulosin (FLOMAX) 0.4 MG capsule Take 1 capsule by mouth 2 times daily Yes Raymundo VILLASENOR MD   montelukast (SINGULAIR) 10 MG tablet TAKE 1 TABLET BY MOUTH EVERY NIGHT Yes Attila Alvarado,    atorvastatin (LIPITOR) 40 MG tablet Take 40 mg by mouth nightly  Yes Historical Provider, MD   valsartan (DIOVAN) 80 MG tablet Take 80 mg by mouth daily Yes Historical Provider, MD   Multiple Vitamin (MULTIVITAMIN) tablet Take 1 tablet by mouth daily  Yes Historical Provider, MD   Magnesium 400 MG CAPS Take 400 mg by mouth daily  Yes Historical Provider, MD   vitamin D-3 (CHOLECALCIFEROL) 125 MCG (5000 UT) TABS Take by mouth Yes Historical Provider, MD   vitamin C (ASCORBIC ACID) 500 MG tablet Take 500 mg by mouth daily  Yes Historical Provider, MD   sodium chloride (OCEAN, BABY AYR) 0.65 % nasal spray 1 spray by Nasal route every 6 hours as needed (moisturization)  Raymundo VILLASENOR MD   methylPREDNISolone (MEDROL, YANA,) 4 MG tablet Take 1 tablet by mouth See Admin Instructions Take by mouth. Patient not taking: Reported on 6/1/2021  Ángela Sanchez MD   methylPREDNISolone (MEDROL, YANA,) 4 MG tablet Take 1 tablet by mouth See Admin Instructions Take by mouth.   Patient not taking: Reported on 5/25/2021  Ángela Sanchez MD      Scheduled Meds:   carvedilol  6.25 mg Oral BID WC    nitroglycerin  0.5 inch Topical 4 times per day    lidocaine  1 patch TransDERmal Daily    atorvastatin  40 mg Oral Nightly    finasteride  5 mg Oral Daily    montelukast  10 mg Oral Nightly    tamsulosin  0.4 mg Oral BID    valsartan  80 mg Oral Daily    sodium chloride flush  10 mL IntraVENous 2 times per day    sodium chloride flush  5-40 mL IntraVENous 2 times per day    sodium chloride flush  5-40 mL IntraVENous 2 times per day    aspirin  81 mg Oral Daily    clopidogrel  75 mg Oral Daily     Continuous Infusions:   sodium chloride      sodium chloride Stopped (11/04/21 1702)    sodium chloride      sodium chloride 75 mL/hr at 11/06/21 2026    sodium chloride       PRN Meds:calcium carbonate, oxyCODONE-acetaminophen, sodium chloride flush, sodium chloride, acetaminophen **OR** acetaminophen, magnesium hydroxide, nitroGLYCERIN, ondansetron, sodium chloride flush, sodium chloride, perflutren lipid microspheres, sodium chloride flush, sodium chloride, morphine     Past Medical History:  Past Medical History:   Diagnosis Date    AAA [facility]) [facility] [address]     CAD (coronary artery disease)     stents 2017    Cancer ([facility] [address]     Hyperlipidemia     Hypertension     Nasal bleeding     worse since covid making the mask mandatory        Past Surgical History:    has a past surgical history that includes Cardiac surgery (2017); Colonoscopy; Sinus endoscopy (N/A, 11/2/2020); Sinus endoscopy (Right, 4/12/2021); Upper gastrointestinal endoscopy (N/A, 9/8/2021); and IR KYPHOPLASTY THORACIC 1 VERTEBRAL BODY (9/16/2021). Social History:  Reviewed. reports that he has never smoked. He has never used smokeless tobacco. He reports current drug use. Drug: Marijuana Jesi Sigifredo). He reports that he does not drink alcohol. Family History:  Reviewed. family history includes Other in his brother. Denies family history of sudden cardiac death, arrhythmia, premature CAD    Review of Systems:  · Constitutional: Negative for fever, weight changes, or weakness  · Skin: Negative for bruising, bleeding, blood clots, or changes in skin pigment  · HEENT: Negative for vision changes or dysphagia  · Respiratory: Reviewed in HPI  · Cardiovascular: Reviewed in HPI  · Gastrointestinal: Negative for abdominal pain or black/tarry stools  · Genito-Urinary: Negative for hematuria  · Musculoskeletal: No focal weakness  · Neurological/Psych: Negative for confusion or TIA-like symptoms.       Physical Examination:  Vitals:    11/07/21 0429   BP: 110/67   Pulse: 65   Resp: 17   Temp: 97.1 °F (36.2 °C)   SpO2: 93%      In: -   Out: 1175    Wt Readings from Last 3 Encounters:   11/05/21 168 lb 6.4 oz (76.4 kg)   10/15/21 175 lb (79.4 kg)   10/05/21 176 lb (79.8 kg)       Intake/Output Summary (Last 24 hours) at 11/7/2021 0754  Last data filed at 11/7/2021 0600  Gross per 24 hour   Intake    Output 1775 ml   Net -1775 ml     Telemetry: Personally Reviewed Normal sinus rhythm  · Constitutional: Cooperative and in no apparent distress, and appears ill  · Skin: Warm and pink; no cyanosis, bruising, or clubbing  · HEENT: Symmetric and normocephalic. Conjunctiva pink with clear sclera. Mucus membranes pink and moist.   · Cardiovascular: regular and rhythm. S1 & S2, negative for murmurs. Peripheral pulses 2+, capillary refill < 3 seconds. negative elevation of JVP. No edema  · Respiratory: Respirations symmetric and unlabored. Lungs clear to auscultation bilaterally, no wheezing, crackles, or rhonchi  · Gastrointestinal: Abdomen soft and round. Bowel sounds normoactive in all quadrants. · Musculoskeletal: No focal weakness. · Neurologic/Psych: Awake and orientated to person, place and time. Appears uncomfortable and agitated    Pertinent labs, diagnostic, device, and imaging results reviewed as a part of this visit    Labs:    BMP:   Recent Labs     11/05/21  0515      K 5.0      CO2 21   BUN 15   CREATININE 0.7*     Estimated Creatinine Clearance: 90 mL/min (A) (based on SCr of 0.7 mg/dL (L)).    CBC:   Recent Labs     11/06/21  0443   WBC 18.3*   HGB 11.9*   HCT 35.4*   MCV 81.5        Thyroid: No results found for: TSH, B3XQXLS, A3QAWQS, THYROIDAB  Lipids:   Lab Results   Component Value Date    CHOL 142 11/05/2021    HDL 41 11/05/2021    TRIG 78 11/05/2021     LFTS:   Lab Results   Component Value Date    ALT 28 11/03/2021    AST 20 11/03/2021    ALKPHOS 95 11/03/2021    PROT 6.6 11/03/2021    AGRATIO 1.4 11/03/2021    BILITOT 0.3 11/03/2021     Cardiac Enzymes:   Lab Results   Component Value Date    TROPONINI 0.04 11/06/2021 TROPONINI 0.03 2021    TROPONINI 0.22 2021     Coags:   Lab Results   Component Value Date    PROTIME 13.7 09/15/2021    INR 1.20 09/15/2021     EC2021: Normal sinus rhythm  Left axis deviation  ST & T wave abnormality, consider inferior ischemia  ST & T wave abnormality, consider anterolateral ischemia  Prolonged QT    ECHO:  2021  Summary   -Left ventricular cavity size is normal.   -Ejection fraction is visually estimated to be 45-50%. -The apical septum and basal inferoseptum and apical inferior wall appear   akinetic.   -Grade I diastolic dysfunction with normal LV filling pressures. E/e' = 9.0.   -Normal function of all valves. Stress Test: none this admission    Cardiac Cath LV2021  Anatomy:   LM-distal 20%  LAD-mid stent patent distal 60%  Cx-dominant, mid 50%  OM- prox 99% ISR  RCA-small non dom  LPDA- nml  LVEF- 45%  LVG- inferoapical hypokinesis  LVEDP- 2     Intervention  ~Successful PCI to OM with 2.75x12 MARIANNA to 20atm.  Excellent Result.      Contrast: 76  Flouro Time: 5.3  Access: R radial a     Impression  ~Coronary Angiography w/ severe single vessel CAD  ~LVG with LVEF of 45 and inferoapical regional wall motion abnormalities  ~Successful complex angioplasty and stenting of OM    PRANAV Ruth-CNP  [address]   Office: [phone]

## 2021-11-08 NOTE — PROGRESS NOTES
Aware of discharge with home care. Home care orders sent to [facility]. Discharge planner notified.

## 2021-11-08 NOTE — PROGRESS NOTES
[address] [phone] was active with this pt prior to admit. Discharge planner notified. Will follow.

## 2021-11-09 NOTE — PROGRESS NOTES
[address]    11/10/2021    Cesar Liu (:  1945) is a 67 y.o. male is here establish care and management of CAD and modifiable risk factors. Referring Provider: Nori Holt MD    HISTORY:  Mr. Tamy Smith is here to establish cardiac care in our office. He previously followed by [address] [place] [address]. He has a history of coronary artery disease s/p MI with stent placement to the LAD and marginal vessel in 2017 ; hypertension ; hyperlipidemia ; and AAA. 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 He was treated with radiation therapy. He was hospitalized at [facility] on 11/3/21 with chest pain and elevated troponin of 0.22.  21 [facility] showed in-stent restenosis of OM (99%), revascularized with MARIANNA x 1. Today, he has not had recurrence of anginal chest discomfort. He does note chest discomfort, indigestion like, worse with laying down and coughing. No shortness of breath, light headedness or dizziness. REVIEW OF SYSTEMS:  A complete review of systems was reviewed and is negative except as noted in the history of present illness. Prior to Visit Medications    Medication Sig Taking? Authorizing Provider   nitroGLYCERIN (MINITRAN) 0.2 MG/HR Place 1 patch onto the skin daily And remove at night Yes Nkechi Lopez MD   pantoprazole (PROTONIX) 40 MG tablet Take 1 tablet by mouth every morning (before breakfast) Yes Nkechi Lopez MD   aspirin 81 MG EC tablet Take 1 tablet by mouth daily Yes Coretta Cordero MD   nitroGLYCERIN (NITROSTAT) 0.4 MG SL tablet up to max of 3 total doses. If no relief after 1 dose, call 911.  Yes Coretta Cordero MD   calcium carbonate (TUMS) 500 MG chewable tablet Take 1 tablet by mouth 3 times daily as needed for Heartburn Yes Coretta Cordero MD   carvedilol (COREG) 6.25 MG tablet Take 1 tablet by mouth 2 times daily (with meals) Yes Marisol Posada MD   clopidogrel (PLAVIX) 75 MG tablet Take 1 tablet by mouth daily Yes Marisol Posada MD   dexamethasone (DECADRON) 6 MG tablet Take 1 tablet by mouth 2 times daily (with meals) for 14 days Yes Marisol Posada MD   sodium chloride (OCEAN, BABY AYR) 0.65 % nasal spray 1 spray by Nasal route every 6 hours as needed (moisturization) Yes Marisol Posada MD   oxyCODONE-acetaminophen (PERCOCET)  MG per tablet 1 tablet: TAKE 1 TABLET EVERY 8 HOURS AS NEEDED FOR PAIN Yes Historical Provider, MD   finasteride (PROSCAR) 5 MG tablet Take 1 tablet by mouth daily Yes Raymundo VILLASENOR MD   tamsulosin (FLOMAX) 0.4 MG capsule Take 1 capsule by mouth 2 times daily Yes Raymundo VILLASENOR MD   atorvastatin (LIPITOR) 40 MG tablet Take 40 mg by mouth nightly  Yes Historical Provider, MD   valsartan (DIOVAN) 80 MG tablet Take 80 mg by mouth daily Yes Historical Provider, MD   Multiple Vitamin (MULTIVITAMIN) tablet Take 1 tablet by mouth daily  Yes Historical Provider, MD   Magnesium 400 MG CAPS Take 400 mg by mouth daily  Yes Historical Provider, MD   vitamin D-3 (CHOLECALCIFEROL) 125 MCG (5000 UT) TABS Take by mouth Yes Historical Provider, MD   vitamin C (ASCORBIC ACID) 500 MG tablet Take 500 mg by mouth daily  Yes Historical Provider, MD   lidocaine 4 % external patch Place 1 patch onto the skin daily for 4 doses  Marisol Posada MD   montelukast (SINGULAIR) 10 MG tablet TAKE 1 TABLET BY MOUTH EVERY NIGHT  Cornelio Calvin DO        Allergies   Allergen Reactions    Fish Oil Anaphylaxis    No Known Allergies Anaphylaxis    Shellfish Allergy Anaphylaxis    Lisinopril      Other reaction(s): Cough       Past Medical History:   Diagnosis Date    [facility] [address]     CAD (coronary artery disease)     stents 2017    Cancer ([facility] [address]     Hyperlipidemia     Hypertension     Nasal bleeding     worse since covid making the mask mandatory       Past Surgical History:   Procedure Laterality Date    COLONOSCOPY      CORONARY ANGIOPLASTY WITH STENT PLACEMENT  2017 2017 and 2021    IR KYPHOPLASTY THORACIC FIRST LEVEL  09/16/2021    IR KYPHOPLASTY THORACIC FIRST LEVEL 9/16/2021 [facility] SPECIAL PROCEDURES    SINUS ENDOSCOPY N/A 11/02/2020    FUNCTIONAL ENDOSCOPIC SINUS SURGERY performed by Rivka Hart MD at [facility] Right 04/12/2021    RIGHT FUNCTIONAL ENDOSCOPIC SINUS SURGERY (43250, [id number], [id number]) performed by Rivka Hart MD at [address] [place] [address] N/A 09/08/2021    EGD GASTRIC BIOPSY performed [facility] Viv [facility]neros MD [facility] [address] [facility] [employer] History     Tobacco Use    Smoking status: Never Smoker    Smokeless tobacco: Never Used   Substance Use Topics    Alcohol use: Never        Family History   Problem Relation Age of Onset    Other Brother         anurysim       PHYSICAL EXAMINATION:  Vitals:    11/10/21 1404   BP: 130/72   Pulse: 50   SpO2: 97%   Weight: 168 lb (76.2 kg)  Comment: per wife   Height: 5' 9\" (1.753 m)     Estimated body mass index is 24.81 kg/m² as calculated from the following:    Height as of this encounter: 5' 9\" (1.753 m). Weight as of this encounter: 168 lb (76.2 kg). Physical Exam  Constitutional:       Appearance: He is well-developed. He is not diaphoretic. HENT:      Head: Normocephalic and atraumatic. Eyes:      General: No scleral icterus. Extraocular Movements: Extraocular movements intact. Conjunctiva/sclera: Conjunctivae normal.   Neck:      Vascular: No JVD. Cardiovascular:      Rate and Rhythm: Normal rate and regular rhythm. Heart sounds: Normal heart sounds. No murmur heard. No friction rub. No gallop. Pulmonary:      Effort: Pulmonary effort is normal. No respiratory distress. Breath sounds: Normal breath sounds. No wheezing or rales. Abdominal:      General: Bowel sounds are normal.      Palpations: Abdomen is soft. Tenderness: There is no abdominal tenderness. Musculoskeletal:         General: Normal range of motion. Cervical back: Normal range of motion and neck supple. Skin:     General: Skin is warm and dry. Findings: No rash. Neurological:      General: No focal deficit present. Mental Status: He is alert and oriented to person, place, and time. Psychiatric:         Mood and Affect: Mood normal.         Behavior: Behavior normal.         Thought Content: Thought content normal.         Judgment: Judgment normal.           I have reviewed all pertinent lab results and diagnostic testing. LABS:  CBC:   Lab Results   Component Value Date    WBC 8.8 11/07/2021    RBC 3.91 11/07/2021    HGB 10.9 11/07/2021    HCT 33.0 11/07/2021    MCV 84.4 11/07/2021    RDW 14.7 11/07/2021     11/07/2021     CMP:   Lab Results   Component Value Date     11/05/2021    K 5.0 11/05/2021    K 5.0 11/04/2021     11/05/2021    CO2 21 11/05/2021    BUN 15 11/05/2021    CREATININE 0.7 11/05/2021    CREATININE 0.9 07/02/2020    GFRAA >60 11/05/2021    AGRATIO 1.4 11/03/2021    LABGLOM >60 11/05/2021    GLUCOSE 155 11/05/2021    PROT 6.6 11/03/2021    CALCIUM 9.5 11/05/2021    BILITOT 0.3 11/03/2021    ALKPHOS 95 11/03/2021    AST 20 11/03/2021    ALT 28 11/03/2021     Lab Results   Component Value Date    CHOL 142 11/05/2021    TRIG 78 11/05/2021    HDL 41 11/05/2021    LDLCALC 85 11/05/2021       PT/INR: No results found for: PTINR     Echo 11/5/21:   Summary   -Left ventricular cavity size is normal.   -Ejection fraction is visually estimated to be 45-50%. -The apical septum and basal inferoseptum and apical inferior wall appear   akinetic.   -Grade I diastolic dysfunction with normal LV filling pressures. E/e' = 9.0.   -Normal function of all valves.        Cardiac cath 11/4/21: [facility])   Anatomy:   LM-distal 20%  LAD-mid stent patent distal 60%  Cx-dominant, mid 50%  OM- prox 99% ISR  RCA-small non dom  LPDA- nml  LVEF- 45%  LVG- inferoapical hypokinesis  LVEDP- 2     Intervention  ~Successful PCI to OM with 2.75x12 MARIANNA to 20atm. Excellent Result.        Impression  ~Coronary Angiography w/ severe single vessel CAD  ~LVG with LVEF of 45 and inferoapical regional wall motion abnormalities  ~Successful complex angioplasty and stenting of OM       Pharmacologic nuclear stress test 9/1/21 ([facility])   IMAGING FINDINGS:   LVEDV:   90ml     (Normal is up to 100ml for women and 150ml for men)   LVEF:   56 %      (Normal is at least 62% for women and 53% for men)   TRANSIENT DILATATION RATIO:    0.9      (Normal is up to 1.3 for women and 1.2 for men)   LV WALL MOTION:   Multifocal areas of inferoseptal hypokinesis and dyskinesis. Small areas of apical akinesis. SPECT PERFUSION IMAGES:   Small fixed defect in the apical septal segment. No definite reversible defect. Echo 9/1/21: ([address] [place] [address])  Summary:   The left ventricular wall motion is normal.   Overall left ventricular ejection fraction is estimated to be 55-60%. Left ventricular systolic function is normal. (LVEF>/=55%)   There is mild concentric left ventricular hypertrophy. The diastolic function is impaired and classified as Grade 1 (impaired   relaxation). The right ventricle is mildly dilated. The left atrium is mildly dilated. US of abdomen aorta screening 9/30/19 ([facility])  3.0 cm aneurysmal dilatation proximal abdominal aorta. Mid aorta 2.6 cm. Three year follow-up is recommended. Echo 8/2/17: ([address] [place] [address])   Summary:   The left ventricular wall motion is normal.   The left atrium is dilated. There is mild concentric left ventricular hypertrophy. The mitral inflow pattern is consistent with Diastolic Dysfunction. Left ventricular systolic function is normal. (LVEF>/=55%)   Overall left ventricular ejection fraction is estimated to be 55-60%. Cardiac cath 8/1/17: [facility]  Findings:   Left Ventricle:  EF=55%,  Wall motion: normal.  LVEDP=8 mmHg.  No   aortic valve gradient seen. Moderate, diffuse coronary calcification     Coronary angiogram:   Left Main Trunk (LMT): normal   Left Anterior Descending (LAD): mildly ectatic, diffuse 30-40%   prox, mid and distal with focal 80% mid (EM 3 flow)   Left Circumflex (LCX): dominant, ectatic proximal 90% OM2 and   diffuse 60% distal OM2 (EM 3 flow), 50% mLCX, diffuse 60-70%   LPL   Right Coronary (RCA): small, nondominant, prox 80%, distal 80%   (vessel size < 2.0 mm)     FFR LAD - 0.87 resting ratio, 0.69 post IV adenosine (positive   ischemic response)   FFR LCX - 0.98 resting ratio, 0.87 post IV adenosine (negative   ischemic response)     PCI mLAD - Synergy 3.0x12; 80%->0% (EM 3 pre and post)   PCI pOM2 - Synergy 2.5x12; 90%->0% (EM 3 pre and post)     IMPRESSION:   1) Severe multivessel CAD, s/p PCI OM2 and mLAD (MARIANNA)   2) Neg FFR of LCX (dominant)   3) Normal LV function and EDP     PLAN:   1) DAPT minimum 1 year   2) Statin, beta blocker   3) ACE if indicated   4) Echo   5) Aggressive CRF modification       ASSESSMENT/PLAN:  1. Coronary artery disease due to lipid rich plaque  - 8/1/17 [facility] - severe multivessel disease with MARIANNA to LAD and OM. Stenosis of RCA and Lcx (negative FFR) treated medically  - 9/1/21 Echo - EF 55-60%, wall motion normal  - 9/1/21 [facility] - Small fixed defect in the apical septal segment. No definite reversible defect. - 11/3/21 hospitalization for NSTEMI with peak troponin 0.22  - 11/4//21 [facility] - PCI to OM with 2.75x12 MARIANNA. LAD stent patent. Nonobstructive disease of LM, distal LAD, mid Lcx. EF 45% with inferoapical hypokinesis  - 11/5/21 Echo -  45-50%, apical septum and basal inferoseptum and apical inferior wall appear akinetic. - ASA, Plavix, Coreg, statin    Plan:  Chest pain which sounds more GI; doubt anginal.  Add nitrate as patient thinks that it  Helps. .      2. Essential hypertension  - Blood pressure 130/72, pulse 50, height 5' 9\" (1.753 m), weight 168 lb (76.2 kg), SpO2 97 %. - Coreg, Valsartan  - 11/5/21 - K+ 5.0, Creat 0.7    Plan: Stable. Continue current medical regimen. 3. Hyperlipidemia, unspecified hyperlipidemia type  - 11/5/21 -- TC- 142, TG- 78, HDL- 41, LDL- 85. Liver enzymes normal  - Atorvastatin 40 mg    Plan : Stable. Continue current medical regimen. 4. Abdominal aortic aneurysm (AAA) without rupture ([address]  - 9/30/19 US of abd aorta -- 3.0 cm aneurysmal dilatation proximal abdominal aorta. Mid aorta 2.6 cm. Three year follow-up is recommended    Plan : Stable. Continue current medical regimen. 5.  Ischemic cardiomyopathy  Plan:  Continue Coreg and Valsartan. Plan:   1. Add NTG patch. 2.  Continue other cardiac medical regimen. 3.  Protonix. 4.  GI evaluation. 5.  RTC to NP in 3 months. Return in about 3 months (around 2/10/2022). Scribe's attestation: This note was scribed in the presence of Cassidy Tolliver M.D. by Saeid Luna RN    Physician Attestation: The scribe's documentation has been prepared under my direction and personally reviewed by me in its entirety. I confirm that the note above accurately reflects all work, treatment, procedures, and medical decision making performed by me. An  electronic signature was used to authenticate this note. Kyler Krause MD, [facility], [address]

## 2021-11-10 NOTE — PATIENT INSTRUCTIONS
1.  Nitropatch 0.2 mg patch daily. Apply in the morning and remove at night  2. Protonix 40 mg daily (for indigestion). Take daily for now, but want patient to see GI specialist to evaluate indigestion. 3.  Referral to Dr. Leah Pinzon for GI evaluation  4. Follow up with NP (Crissy or Sherif Hernandez) in three to six months  5.   Follow up with Dr. Carlos Schmidt in 9 months

## 2021-11-11 NOTE — PROGRESS NOTES
Post-Discharge Transitional Care Management Services or Hospital Follow Up      [address]   YOB: 1945    Date of Office Visit:  11/11/2021  Date of Hospital Admission: 11/3/21  Date of Hospital Discharge: 11/7/21  Readmission Risk Score(high >=14%. Medium >=10%):Readmission Risk Score: 26      Care management risk score Rising risk (score 2-5) and Complex Care (Scores >=6): 0     Non face to face  following discharge, date last encounter closed (first attempt may have been earlier): *No documented post hospital discharge outreach found in the last 14 days *No documented post hospital discharge outreach found in the last 14 days    Call initiated 2 business days of discharge: *No response recorded in the last 14 days     Patient Active Problem List   Diagnosis    Subacute pansinusitis    Nasal polyposis    Cancer of nasal cavity and sinus (HCC)    Abdominal aortic aneurysm (AAA) without rupture ([facility] [address]    Uncontrolled pain    Pathologic thoracic fracture, initial encounter    Hypertensive urgency    Spine metastasis ([address]    Bandemia    Hyponatremia    Coronary artery disease due to lipid rich plaque    Fatty liver disease, nonalcoholic    Essential hypertension    Hyperlipidemia    Acute chest pain    NSTEMI (non-ST elevated myocardial infarction) ([address]       Allergies   Allergen Reactions    Fish Oil Anaphylaxis    No Known Allergies Anaphylaxis    Shellfish Allergy Anaphylaxis    Lisinopril      Other reaction(s): Cough       Medications listed as ordered at the time of discharge from hospital  @DISCHARGEMEDSLIST(<NOROUTINE> error)@      Medications marked \"taking\" at this time  Outpatient Medications Marked as Taking for the 11/11/21 encounter (Office Visit) with Susana Castano MD   Medication Sig Dispense Refill    oxyCODONE-acetaminophen (PERCOCET)  MG per tablet Take 1 tablet by mouth 2 times daily as needed for Pain for up to 30 days.  30 tablet 0    nitroGLYCERIN (MINITRAN) 0.2 MG/HR Place 1 patch onto the skin daily And remove at night 30 patch 5    pantoprazole (PROTONIX) 40 MG tablet Take 1 tablet by mouth every morning (before breakfast) 30 tablet 1    aspirin 81 MG EC tablet Take 1 tablet by mouth daily 30 tablet 3    nitroGLYCERIN (NITROSTAT) 0.4 MG SL tablet up to max of 3 total doses.  If no relief after 1 dose, call 911. 25 tablet 3    calcium carbonate (TUMS) 500 MG chewable tablet Take 1 tablet by mouth 3 times daily as needed for Heartburn 60 tablet 1    carvedilol (COREG) 6.25 MG tablet Take 1 tablet by mouth 2 times daily (with meals) 60 tablet 3    lidocaine 4 % external patch Place 1 patch onto the skin daily for 4 doses 10 patch 5    clopidogrel (PLAVIX) 75 MG tablet Take 1 tablet by mouth daily 30 tablet 3    dexamethasone (DECADRON) 6 MG tablet Take 1 tablet by mouth 2 times daily (with meals) for 14 days 28 tablet 1    sodium chloride (OCEAN, BABY AYR) 0.65 % nasal spray 1 spray by Nasal route every 6 hours as needed (moisturization) 1 each 1    finasteride (PROSCAR) 5 MG tablet Take 1 tablet by mouth daily 30 tablet 3    tamsulosin (FLOMAX) 0.4 MG capsule Take 1 capsule by mouth 2 times daily 30 capsule 3    montelukast (SINGULAIR) 10 MG tablet TAKE 1 TABLET BY MOUTH EVERY NIGHT 90 tablet 3    atorvastatin (LIPITOR) 40 MG tablet Take 40 mg by mouth nightly       valsartan (DIOVAN) 80 MG tablet Take 80 mg by mouth daily      Multiple Vitamin (MULTIVITAMIN) tablet Take 1 tablet by mouth daily       Magnesium 400 MG CAPS Take 400 mg by mouth daily       vitamin D-3 (CHOLECALCIFEROL) 125 MCG (5000 UT) TABS Take by mouth      vitamin C (ASCORBIC ACID) 500 MG tablet Take 500 mg by mouth daily           Medications patient taking as of now reconciled against medications ordered at time of hospital discharge: Yes    Chief Complaint   Patient presents with    Follow-Up from Hospital     hospital follow up       HPI    Inpatient course: Discharge summary reviewed- see chart. Pt was having CP with exertion and went to the ER and had normal EKG but elevated troponin levels and was seen by Cardiology and had a cardiac cath done which showed stent restenosis and a new stent was placed and pt was started on coreg and plavix medication. Patient saw Cardiology yesterday and was felt to be stable but was Rx nitro patch and protonix medication which pt has not started using yet    Pt continues to take flomax and proscar medications for BPH and kidney stones     Of note pt did recently complete 30 radiation treatments for his nasal sinus cancer. Pt is currently on a saline/steroid/antibiotic spray in his nose twice a day     Patient has a past medical history significant for dyslipidemia, HTN and CAD s/p MI with stent placement 2017 and is on lipitor and diovan medication.     Patient has been using medical marijuania CBD oil during the day and percocet at night for his spine metastasis pain     Pt denies any hx of asthma or strokes     FHx unknown; Pt refuses to get COVID vaccine      Pt is an exsmoker quit 2001 and denies alcohol use      Cardiology note 11/10/2021   Mr. Dov Cox is here to establish cardiac care in our office. He previously followed by [facility]. He has a history of coronary artery disease s/p MI with stent placement to the LAD and marginal vessel in 2017 ; hypertension ; hyperlipidemia ; and AAA. 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 He was treated with radiation therapy.       He was hospitalized at [facility] on 11/3/21 with chest pain and elevated troponin of 0.22.  11/4/21 [facility] showed in-stent restenosis of OM (99%), revascularized with MARIANNA x 1.      Today, he has not had recurrence of anginal chest discomfort.   He does note chest discomfort, indigestion like, worse with laying down Hyperlipidemia, unspecified hyperlipidemia type  - 11/5/21 -- TC- 142, TG- 78, HDL- 41, LDL- 85. Liver enzymes normal  - Atorvastatin 40 mg     Plan : Stable. Continue current medical regimen.     4. Abdominal aortic aneurysm (AAA) without rupture ([address]  - 9/30/19 US of abd aorta -- 3.0 cm aneurysmal dilatation proximal abdominal aorta. Mid aorta 2.6 cm. Three year follow-up is recommended     Plan : Stable. Continue current medical regimen.     5. Ischemic cardiomyopathy  Plan:  Continue Coreg and Valsartan.     Plan:   1. Add NTG patch. 2.  Continue other cardiac medical regimen. 3.  Protonix. 4.  GI evaluation. 5.  RTC to NP in 3 months. Review of Systems   Constitutional: Positive for fatigue. Negative for fever. HENT: Negative for nosebleeds and sore throat. Eyes:        Negative blurred vision or diplopia   Respiratory: Positive for cough (when he has indigestion). Negative for shortness of breath. Cardiovascular: Negative for chest pain, palpitations and leg swelling. Gastrointestinal: Negative for abdominal pain, blood in stool, nausea and vomiting. Negative melena; Positive indigestion with belching episodes   Endocrine: Negative for polydipsia and polyuria. Genitourinary: Negative for dysuria and hematuria. Musculoskeletal: Positive for back pain (thoracic area at times). Negative for arthralgias. Skin: Negative for rash. Neurological: Positive for weakness (generalized and using wheelchair for ambulation). Negative for dizziness, seizures, syncope, speech difficulty and headaches. Psychiatric/Behavioral: Positive for dysphoric mood (from recent health issues). Negative for hallucinations. The patient is not nervous/anxious. Reviewed. See MA note. Vitals:    11/11/21 1459   BP: 132/78   Pulse: 61   Resp: 24   SpO2: 97%   Weight: 168 lb (76.2 kg)   Height: 5' 9\" (1.753 m)     Body mass index is 24.81 kg/m².    Wt Readings from Last 3 Encounters:   11/11/21 168 lb (76.2 kg)   11/10/21 168 lb (76.2 kg)   11/05/21 168 lb 6.4 oz (76.4 kg)     BP Readings from Last 3 Encounters:   11/11/21 132/78   11/10/21 130/72   11/07/21 106/61       Physical Exam  Vitals reviewed. Constitutional:       General: He is not in acute distress. HENT:      Mouth/Throat:      Mouth: Mucous membranes are moist.      Pharynx: Oropharynx is clear. Eyes:      General: No scleral icterus. Comments: Pink conjunctivae    Neck:      Thyroid: No thyromegaly. Comments: No carotid bruits noted B/L  Cardiovascular:      Heart sounds: Normal heart sounds. No murmur heard. No friction rub. No gallop. Pulmonary:      Effort: Pulmonary effort is normal.      Breath sounds: Normal breath sounds. Abdominal:      Palpations: Abdomen is soft. Tenderness: There is no abdominal tenderness. Musculoskeletal:      Comments: No leg edema noted B/L   Lymphadenopathy:      Cervical: No cervical adenopathy. Skin:     Findings: No rash. Neurological:      Mental Status: He is alert. Comments: Cranial nerves 2 - 12 grossly intact; Muscle strength 5/5 in UE's and 4/5 in LE's    Psychiatric:         Mood and Affect: Mood normal.             Assessment/Plan:  1. Essential hypertension, benign  Patient clinically stable on present medications and had normal BMP bloodwork in the hospital    2. Malignant neoplasm of nasal cavities (HCC)  Pt is being managed by ENT and Oncology but is reluctant to pursue any treatment with further radiation or chemotherapy     3. Coronary artery disease involving native heart without angina pectoris, unspecified vessel or lesion type  Pt is s/p NSTEMI with restenting of LAD via cardiac cath and is being followed by Cardiology and is clinically stable on present medications but was told to start using Rx nitroglycerin patch for better control   - LA DISCHARGE MEDS RECONCILED W/ CURRENT OUTPATIENT MED LIST    4.  Spine metastasis ([address]  Pt with good pain control using medical marijuana and percocet as needed but does have weakness in his lower extremities and plan is for home physical therapy  - oxyCODONE-acetaminophen (PERCOCET)  MG per tablet; Take 1 tablet by mouth 2 times daily as needed for Pain for up to 30 days. Dispense: 30 tablet; Refill: 0    5. Gastroesophageal reflux disease without esophagitis  Pt was told to start taking Rx protonix medication and to notify MD if you note no improvement in 5 - 7 days. Pt is a with a nontender abdomen on PE         Return in about 2 months (around 1/11/2022).       Medical Decision Making: high complexity

## 2021-11-16 NOTE — TELEPHONE ENCOUNTER
Spoke to Jose Banda at [facility], informed her that Hospice orders need to come from the provider that gave patient the terminal diagnosis (oncology). Jose Banda will contact patient's wife to find out who that is.

## 2021-11-16 NOTE — TELEPHONE ENCOUNTER
The medical director can follow the patient primarily. I have can be available as needed for any nasal issues.

## 2021-11-16 NOTE — TELEPHONE ENCOUNTER
Francisco Javier Maldonado from [facility] called and asked for the referral for [facility] to be faxed over to her. Along with patients demographics, insurance cards. I will fax this information over to [phone]    Francisco Javier Maldonado would also like to know if Dr. Eduar Lee will follow the patient through Hospice or if he would like the medical director to follow the patient.

## 2021-11-16 NOTE — TELEPHONE ENCOUNTER
Ryan López from [facility] called in regards to patient. Patients wife is requesting a referral or order for hospice services from provider. If applicable please fax the following to Ryan López at [phone]:  -referral/order   - facesheet/demographics  - DNR if applicable   - and annotate if provider will follow patients care during treatment or if he would prefer their medical director     She was requested that If provider declines to place order or patient is not eligible for hospice care if an order for palliative care be placed.      Any further questions can be addressed with Ryan López at [phone]

## 2021-11-17 NOTE — TELEPHONE ENCOUNTER
I called and spoke with the patient's wife, Rama Cowden. He was picked up for hospice last night and is currently in hospice care.

## 2021-11-17 NOTE — TELEPHONE ENCOUNTER
Spoke with Hospice, informed them patient may be followed primarily by medical director, and DO PRN basis.

## 2021-12-06 ENCOUNTER — TELEPHONE (OUTPATIENT)
Dept: FAMILY MEDICINE CLINIC | Age: 76
End: 2021-12-06

## 2021-12-06 NOTE — TELEPHONE ENCOUNTER
Valerie Valdovinos [address] # [id number]    Orders scanned to media and given to PCP for signature.

## 2021-12-17 ENCOUNTER — TELEPHONE (OUTPATIENT)
Dept: PRIMARY CARE CLINIC | Age: 76
End: 2021-12-17

## 2023-01-05 NOTE — TELEPHONE ENCOUNTER
[address] called requesting verbal orders for home PT for patient. Please review and advise.      [phone] Alexa Escobar .

## 2024-05-15 NOTE — PROGRESS NOTES
Patient has been having bleeding from the right nostril once again. This seems to be occurring on a daily basis. He has treated it with tamponade. His blood counts have been stable. He is to have excision of the mass in the right side of his nose on Monday. Currently, he appears in no acute distress with no active bleeding. Oral examination is unremarkable. Ear examination reveals normal-appearing tympanic membranes and ear canals bilaterally. The neck is free of adenopathy, mass, thyroid enlargement. Nasal mucosa treated with cotton pledgets  impregnated with Afrin and lidocaine placed in middle meatuses against turbinates. Pledgets left in place for five minutes and removed enabling enhanced visualization. The exam revealed positive edema of mucosa. it was negative for erythema indicative of infection. There was not evidence of deviation of the septum which was not significant. There were polyps present. .There were other masses present. The turbinates were not enlarged beyond normal.  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 The areas treated with topical silver nitrate cautery. Gelfoam pack impregnated with Neosporin ointment is placed in the area. Have instructed him to leave this in place and he will start Levaquin preoperatively. 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 He will wait until we try to create a better airway for him and then we will be more stringent on making certain that he follows through with radiation therapy. I have given him a statement that the alternative of letting it go would be horrendous with terrible complications including eye involvement. [place]

## (undated) DEVICE — TOWEL,OR,DSP,ST,BLUE,STD,4/PK,20PK/CS: Brand: MEDLINE

## (undated) DEVICE — MASTISOL ADHESIVE LIQ 2/3ML

## (undated) DEVICE — SPONGE ABSRB L2XW2IN TYP VII GZ 12 PLY XR DTECT LTX FREE WVN

## (undated) DEVICE — MEDICINE CUP, GRADUATED, STER: Brand: MEDLINE

## (undated) DEVICE — DRESSING 470530 SINUSSTENT 20PK PR KNNDY: Brand: MEROCEL®

## (undated) DEVICE — GLOVE ORANGE PI 7 1/2   MSG9075

## (undated) DEVICE — SOLUTION IV IRRIG POUR BRL 0.9% SODIUM CHL 2F7124

## (undated) DEVICE — BW-412T DISP COMBO CLEANING BRUSH: Brand: SINGLE USE COMBINATION CLEANING BRUSH

## (undated) DEVICE — DRAPE EENT SPLIT

## (undated) DEVICE — YANKAUER SUCTION INSTRUMENT NO CONTROL VENT, BULB TIP, CLEAR: Brand: YANKAUER

## (undated) DEVICE — YANKAUER,BULB TIP,W/O VENT,RIGID,STERILE: Brand: MEDLINE

## (undated) DEVICE — DRAPE,EENT,SPLIT,STERILE: Brand: MEDLINE

## (undated) DEVICE — SOLUTION: ACTIFOG W/FOAM PAD 48/CS: Brand: ACTIFOG™

## (undated) DEVICE — HYPODERMIC SAFETY NEEDLE: Brand: MAGELLAN

## (undated) DEVICE — STRIP,CLOSURE,WOUND,MEDI-STRIP,1/2X4: Brand: MEDLINE

## (undated) DEVICE — PROCEDURE KIT ENDOSCP CUST

## (undated) DEVICE — MASC TURNOVER KIT: Brand: MEDLINE INDUSTRIES, INC.

## (undated) DEVICE — SYRINGE MED 10ML TRNSLUC BRL PLUNG BLK MRK POLYPR CTRL

## (undated) DEVICE — FORCEPS BX L240CM WRK CHN 2.8MM STD CAP W/ NDL MIC MESH

## (undated) DEVICE — MOUTHPIECE ENDOSCP L CTRL OPN AND SIDE PORTS DISP

## (undated) DEVICE — PACKING 440411 10PK DOYLE NASAL: Brand: MEROCEL®

## (undated) DEVICE — SET VLV 3 PC AWS DISPOSABLE GRDIAN SCOPEVALET

## (undated) DEVICE — ELECTRODE ELECSURG NDL 2.8 INX7.2 CM COAT INSUL EDGE

## (undated) DEVICE — SOLUTION IV IRRIG WATER 500ML POUR BRL ST 2F7113

## (undated) DEVICE — ADHESIVE LIQ 2OZ ADJUNCT FOR DSG MASTISOL

## (undated) DEVICE — NEEDLE SPNL 25GA L2IN BLU SHT NEO LUM PUNC DISP

## (undated) DEVICE — PACK PROCEDURE SURG EXTREMITY MFFOP CUST

## (undated) DEVICE — GOWN AURORA NONREINF LG: Brand: MEDLINE INDUSTRIES, INC.

## (undated) DEVICE — KIT,ANTI FOG,W/SPONGE & FLUID,SOFT PACK: Brand: MEDLINE